# Patient Record
Sex: MALE | Race: WHITE | NOT HISPANIC OR LATINO | Employment: FULL TIME | URBAN - METROPOLITAN AREA
[De-identification: names, ages, dates, MRNs, and addresses within clinical notes are randomized per-mention and may not be internally consistent; named-entity substitution may affect disease eponyms.]

---

## 2017-01-08 ENCOUNTER — COMMUNICATION - HEALTHEAST (OUTPATIENT)
Dept: CARDIOLOGY | Facility: CLINIC | Age: 57
End: 2017-01-08

## 2017-01-08 DIAGNOSIS — I25.10 CORONARY ATHEROSCLEROSIS: ICD-10-CM

## 2017-01-08 DIAGNOSIS — I42.8 OTHER PRIMARY CARDIOMYOPATHIES: ICD-10-CM

## 2017-01-09 ENCOUNTER — OFFICE VISIT - HEALTHEAST (OUTPATIENT)
Dept: FAMILY MEDICINE | Facility: CLINIC | Age: 57
End: 2017-01-09

## 2017-01-09 DIAGNOSIS — E78.00 PURE HYPERCHOLESTEROLEMIA: ICD-10-CM

## 2017-01-09 DIAGNOSIS — Z12.5 PROSTATE CANCER SCREENING: ICD-10-CM

## 2017-01-09 DIAGNOSIS — M54.50 ACUTE BILATERAL LOW BACK PAIN WITHOUT SCIATICA: ICD-10-CM

## 2017-01-09 DIAGNOSIS — I10 ESSENTIAL HYPERTENSION: ICD-10-CM

## 2017-01-09 DIAGNOSIS — I25.10 CORONARY ATHEROSCLEROSIS: ICD-10-CM

## 2017-01-09 LAB
CHOLEST SERPL-MCNC: 123 MG/DL
LDLC SERPL CALC-MCNC: 44 MG/DL
PSA SERPL-MCNC: 0.6 NG/ML (ref 0–3.5)

## 2017-01-09 ASSESSMENT — MIFFLIN-ST. JEOR: SCORE: 1466.03

## 2017-01-10 ENCOUNTER — COMMUNICATION - HEALTHEAST (OUTPATIENT)
Dept: FAMILY MEDICINE | Facility: CLINIC | Age: 57
End: 2017-01-10

## 2017-02-02 ENCOUNTER — OFFICE VISIT - HEALTHEAST (OUTPATIENT)
Dept: CARDIOLOGY | Facility: CLINIC | Age: 57
End: 2017-02-02

## 2017-02-02 DIAGNOSIS — I21.4 ACUTE NON-ST ELEVATION MYOCARDIAL INFARCTION (NSTEMI) (H): ICD-10-CM

## 2017-02-02 DIAGNOSIS — I25.89 OTHER SPECIFIED FORMS OF CHRONIC ISCHEMIC HEART DISEASE: ICD-10-CM

## 2017-02-02 DIAGNOSIS — I20.89 ANGINA EFFORT: ICD-10-CM

## 2017-02-02 DIAGNOSIS — I25.5 ISCHEMIC CARDIOMYOPATHY: ICD-10-CM

## 2017-02-02 DIAGNOSIS — Z95.5 S/P CORONARY ARTERY STENT PLACEMENT: ICD-10-CM

## 2017-02-02 ASSESSMENT — MIFFLIN-ST. JEOR: SCORE: 1461.49

## 2018-01-16 ENCOUNTER — OFFICE VISIT - HEALTHEAST (OUTPATIENT)
Dept: FAMILY MEDICINE | Facility: CLINIC | Age: 58
End: 2018-01-16

## 2018-01-16 DIAGNOSIS — J10.1 INFLUENZA B: ICD-10-CM

## 2018-01-16 DIAGNOSIS — R05.9 COUGH: ICD-10-CM

## 2018-01-16 LAB
FLUAV AG SPEC QL IA: ABNORMAL
FLUBV AG SPEC QL IA: ABNORMAL

## 2018-01-16 ASSESSMENT — MIFFLIN-ST. JEOR: SCORE: 1484.17

## 2018-02-25 ENCOUNTER — COMMUNICATION - HEALTHEAST (OUTPATIENT)
Dept: CARDIOLOGY | Facility: CLINIC | Age: 58
End: 2018-02-25

## 2018-02-25 DIAGNOSIS — I25.5 ISCHEMIC CARDIOMYOPATHY: ICD-10-CM

## 2018-02-25 DIAGNOSIS — I21.4 ACUTE NON-ST ELEVATION MYOCARDIAL INFARCTION (NSTEMI) (H): ICD-10-CM

## 2018-02-25 DIAGNOSIS — Z95.5 S/P CORONARY ARTERY STENT PLACEMENT: ICD-10-CM

## 2018-02-25 DIAGNOSIS — I20.89 ANGINA EFFORT: ICD-10-CM

## 2018-04-24 ENCOUNTER — OFFICE VISIT - HEALTHEAST (OUTPATIENT)
Dept: CARDIOLOGY | Facility: CLINIC | Age: 58
End: 2018-04-24

## 2018-04-24 ENCOUNTER — AMBULATORY - HEALTHEAST (OUTPATIENT)
Dept: CARDIOLOGY | Facility: CLINIC | Age: 58
End: 2018-04-24

## 2018-04-24 ENCOUNTER — SURGERY - HEALTHEAST (OUTPATIENT)
Dept: CARDIOLOGY | Facility: CLINIC | Age: 58
End: 2018-04-24

## 2018-04-24 DIAGNOSIS — I25.10 CAD (CORONARY ARTERY DISEASE): ICD-10-CM

## 2018-04-24 DIAGNOSIS — I20.9 ANGINA PECTORIS (H): ICD-10-CM

## 2018-04-24 ASSESSMENT — MIFFLIN-ST. JEOR: SCORE: 1461.49

## 2018-05-04 ENCOUNTER — SURGERY - HEALTHEAST (OUTPATIENT)
Dept: CARDIOLOGY | Facility: CLINIC | Age: 58
End: 2018-05-04

## 2018-05-04 ASSESSMENT — MIFFLIN-ST. JEOR: SCORE: 1439.96

## 2018-05-21 ENCOUNTER — OFFICE VISIT - HEALTHEAST (OUTPATIENT)
Dept: FAMILY MEDICINE | Facility: CLINIC | Age: 58
End: 2018-05-21

## 2018-05-21 DIAGNOSIS — I10 HYPERTENSION: ICD-10-CM

## 2018-05-21 DIAGNOSIS — Z00.00 WELLNESS EXAMINATION: ICD-10-CM

## 2018-05-21 DIAGNOSIS — I21.9 ACUTE MYOCARDIAL INFARCTION (H): ICD-10-CM

## 2018-05-21 DIAGNOSIS — E78.5 HYPERLIPIDEMIA: ICD-10-CM

## 2018-05-21 LAB — PSA SERPL-MCNC: 0.6 NG/ML (ref 0–3.5)

## 2018-05-21 ASSESSMENT — MIFFLIN-ST. JEOR: SCORE: 1459.23

## 2018-07-14 ENCOUNTER — OFFICE VISIT - HEALTHEAST (OUTPATIENT)
Dept: FAMILY MEDICINE | Facility: CLINIC | Age: 58
End: 2018-07-14

## 2018-07-14 DIAGNOSIS — H00.012 HORDEOLUM EXTERNUM OF RIGHT LOWER EYELID: ICD-10-CM

## 2018-07-14 ASSESSMENT — MIFFLIN-ST. JEOR: SCORE: 1467.17

## 2018-08-31 ENCOUNTER — COMMUNICATION - HEALTHEAST (OUTPATIENT)
Dept: CARDIOLOGY | Facility: CLINIC | Age: 58
End: 2018-08-31

## 2018-08-31 DIAGNOSIS — I20.89 ANGINA EFFORT: ICD-10-CM

## 2018-08-31 DIAGNOSIS — I25.5 ISCHEMIC CARDIOMYOPATHY: ICD-10-CM

## 2018-08-31 DIAGNOSIS — I21.4 ACUTE NON-ST ELEVATION MYOCARDIAL INFARCTION (NSTEMI) (H): ICD-10-CM

## 2018-08-31 DIAGNOSIS — Z95.5 S/P CORONARY ARTERY STENT PLACEMENT: ICD-10-CM

## 2019-03-04 ENCOUNTER — COMMUNICATION - HEALTHEAST (OUTPATIENT)
Dept: CARDIOLOGY | Facility: CLINIC | Age: 59
End: 2019-03-04

## 2019-03-04 DIAGNOSIS — Z95.5 S/P CORONARY ARTERY STENT PLACEMENT: ICD-10-CM

## 2019-03-04 DIAGNOSIS — I25.5 ISCHEMIC CARDIOMYOPATHY: ICD-10-CM

## 2019-03-04 DIAGNOSIS — I21.4 ACUTE NON-ST ELEVATION MYOCARDIAL INFARCTION (NSTEMI) (H): ICD-10-CM

## 2019-03-04 DIAGNOSIS — I20.89 ANGINA EFFORT: ICD-10-CM

## 2019-07-31 ENCOUNTER — HOSPITAL ENCOUNTER (OUTPATIENT)
Dept: CT IMAGING | Facility: CLINIC | Age: 59
Discharge: HOME | End: 2019-07-31
Attending: FAMILY MEDICINE

## 2019-07-31 ENCOUNTER — OFFICE VISIT - HEALTHEAST (OUTPATIENT)
Dept: FAMILY MEDICINE | Facility: CLINIC | Age: 59
End: 2019-07-31

## 2019-07-31 DIAGNOSIS — R10.32 ABDOMINAL PAIN, LEFT LOWER QUADRANT: ICD-10-CM

## 2019-07-31 DIAGNOSIS — R10.13 ABDOMINAL PAIN, EPIGASTRIC: ICD-10-CM

## 2019-07-31 LAB
ALBUMIN UR-MCNC: NEGATIVE MG/DL
APPEARANCE UR: CLEAR
BACTERIA #/AREA URNS HPF: ABNORMAL HPF
BILIRUB UR QL STRIP: NEGATIVE
COLOR UR AUTO: YELLOW
GLUCOSE UR STRIP-MCNC: NEGATIVE MG/DL
HGB UR QL STRIP: ABNORMAL
KETONES UR STRIP-MCNC: NEGATIVE MG/DL
LEUKOCYTE ESTERASE UR QL STRIP: NEGATIVE
NITRATE UR QL: NEGATIVE
PH UR STRIP: 7 [PH] (ref 5–8)
RBC #/AREA URNS AUTO: ABNORMAL HPF
SP GR UR STRIP: 1.01 (ref 1–1.03)
SQUAMOUS #/AREA URNS AUTO: ABNORMAL LPF
URATE CRY #/AREA URNS HPF: PRESENT /[HPF]
UROBILINOGEN UR STRIP-ACNC: ABNORMAL
WBC #/AREA URNS AUTO: ABNORMAL HPF

## 2019-08-07 ENCOUNTER — OFFICE VISIT - HEALTHEAST (OUTPATIENT)
Dept: FAMILY MEDICINE | Facility: CLINIC | Age: 59
End: 2019-08-07

## 2019-08-07 DIAGNOSIS — N30.01 ACUTE CYSTITIS WITH HEMATURIA: ICD-10-CM

## 2019-08-07 LAB
ALBUMIN UR-MCNC: NEGATIVE MG/DL
APPEARANCE UR: CLEAR
BACTERIA #/AREA URNS HPF: ABNORMAL HPF
BILIRUB UR QL STRIP: NEGATIVE
COLOR UR AUTO: YELLOW
GLUCOSE UR STRIP-MCNC: NEGATIVE MG/DL
HGB UR QL STRIP: ABNORMAL
KETONES UR STRIP-MCNC: NEGATIVE MG/DL
LEUKOCYTE ESTERASE UR QL STRIP: NEGATIVE
NITRATE UR QL: NEGATIVE
PH UR STRIP: 5.5 [PH] (ref 5–8)
RBC #/AREA URNS AUTO: ABNORMAL HPF
SP GR UR STRIP: 1.01 (ref 1–1.03)
SQUAMOUS #/AREA URNS AUTO: ABNORMAL LPF
UROBILINOGEN UR STRIP-ACNC: ABNORMAL
WBC #/AREA URNS AUTO: ABNORMAL HPF

## 2019-08-30 ENCOUNTER — COMMUNICATION - HEALTHEAST (OUTPATIENT)
Dept: CARDIOLOGY | Facility: CLINIC | Age: 59
End: 2019-08-30

## 2019-08-30 DIAGNOSIS — I25.5 ISCHEMIC CARDIOMYOPATHY: ICD-10-CM

## 2019-08-30 DIAGNOSIS — I20.89 EXERCISE-INDUCED ANGINA (H): ICD-10-CM

## 2019-08-30 DIAGNOSIS — Z95.5 S/P CORONARY ARTERY STENT PLACEMENT: ICD-10-CM

## 2019-08-30 DIAGNOSIS — I21.4 ACUTE NON-ST ELEVATION MYOCARDIAL INFARCTION (NSTEMI) (H): ICD-10-CM

## 2019-09-04 ENCOUNTER — COMMUNICATION - HEALTHEAST (OUTPATIENT)
Dept: FAMILY MEDICINE | Facility: CLINIC | Age: 59
End: 2019-09-04

## 2019-09-04 ENCOUNTER — OFFICE VISIT - HEALTHEAST (OUTPATIENT)
Dept: FAMILY MEDICINE | Facility: CLINIC | Age: 59
End: 2019-09-04

## 2019-09-04 DIAGNOSIS — Z00.00 WELLNESS EXAMINATION: ICD-10-CM

## 2019-09-04 DIAGNOSIS — Z95.5 S/P CORONARY ARTERY STENT PLACEMENT: ICD-10-CM

## 2019-09-04 DIAGNOSIS — N30.01 ACUTE CYSTITIS WITH HEMATURIA: ICD-10-CM

## 2019-09-04 DIAGNOSIS — I20.89 EXERCISE-INDUCED ANGINA (H): ICD-10-CM

## 2019-09-04 DIAGNOSIS — I21.4 ACUTE NON-ST ELEVATION MYOCARDIAL INFARCTION (NSTEMI) (H): ICD-10-CM

## 2019-09-04 DIAGNOSIS — I25.5 ISCHEMIC CARDIOMYOPATHY: ICD-10-CM

## 2019-09-04 LAB
ALBUMIN UR-MCNC: NEGATIVE MG/DL
APPEARANCE UR: CLEAR
BILIRUB UR QL STRIP: NEGATIVE
COLOR UR AUTO: YELLOW
ERYTHROCYTE [DISTWIDTH] IN BLOOD BY AUTOMATED COUNT: 11.9 % (ref 11–14.5)
GLUCOSE UR STRIP-MCNC: NEGATIVE MG/DL
HBA1C MFR BLD: 5.1 % (ref 3.5–6)
HCT VFR BLD AUTO: 43.1 % (ref 40–54)
HGB BLD-MCNC: 15.3 G/DL (ref 14–18)
HGB UR QL STRIP: ABNORMAL
KETONES UR STRIP-MCNC: NEGATIVE MG/DL
LEUKOCYTE ESTERASE UR QL STRIP: NEGATIVE
MCH RBC QN AUTO: 33.9 PG (ref 27–34)
MCHC RBC AUTO-ENTMCNC: 35.5 G/DL (ref 32–36)
MCV RBC AUTO: 95 FL (ref 80–100)
NITRATE UR QL: NEGATIVE
PH UR STRIP: 5.5 [PH] (ref 5–8)
PLATELET # BLD AUTO: 306 THOU/UL (ref 140–440)
PMV BLD AUTO: 6.5 FL (ref 7–10)
RBC # BLD AUTO: 4.51 MILL/UL (ref 4.4–6.2)
SP GR UR STRIP: 1.02 (ref 1–1.03)
UROBILINOGEN UR STRIP-ACNC: ABNORMAL
WBC: 8.5 THOU/UL (ref 4–11)

## 2019-09-05 ENCOUNTER — COMMUNICATION - HEALTHEAST (OUTPATIENT)
Dept: FAMILY MEDICINE | Facility: CLINIC | Age: 59
End: 2019-09-05

## 2019-09-05 LAB
ANION GAP SERPL CALCULATED.3IONS-SCNC: 11 MMOL/L (ref 5–18)
BUN SERPL-MCNC: 16 MG/DL (ref 8–22)
CALCIUM SERPL-MCNC: 9.6 MG/DL (ref 8.5–10.5)
CHLORIDE BLD-SCNC: 100 MMOL/L (ref 98–107)
CHOLEST SERPL-MCNC: 129 MG/DL
CO2 SERPL-SCNC: 25 MMOL/L (ref 22–31)
CREAT SERPL-MCNC: 0.97 MG/DL (ref 0.7–1.3)
FASTING STATUS PATIENT QL REPORTED: ABNORMAL
GFR SERPL CREATININE-BSD FRML MDRD: >60 ML/MIN/1.73M2
GLUCOSE BLD-MCNC: 80 MG/DL (ref 70–125)
HDLC SERPL-MCNC: 37 MG/DL
LDLC SERPL CALC-MCNC: 41 MG/DL
LDLC SERPL CALC-MCNC: ABNORMAL MG/DL
POTASSIUM BLD-SCNC: 4.1 MMOL/L (ref 3.5–5)
PSA SERPL-MCNC: 0.5 NG/ML (ref 0–3.5)
SODIUM SERPL-SCNC: 136 MMOL/L (ref 136–145)
TRIGL SERPL-MCNC: 558 MG/DL

## 2019-09-09 ENCOUNTER — RECORDS - HEALTHEAST (OUTPATIENT)
Dept: ADMINISTRATIVE | Facility: OTHER | Age: 59
End: 2019-09-09

## 2020-03-06 ENCOUNTER — COMMUNICATION - HEALTHEAST (OUTPATIENT)
Dept: FAMILY MEDICINE | Facility: CLINIC | Age: 60
End: 2020-03-06

## 2020-03-06 DIAGNOSIS — I21.4 ACUTE NON-ST ELEVATION MYOCARDIAL INFARCTION (NSTEMI) (H): ICD-10-CM

## 2020-03-06 DIAGNOSIS — I25.5 ISCHEMIC CARDIOMYOPATHY: ICD-10-CM

## 2020-03-06 DIAGNOSIS — Z95.5 S/P CORONARY ARTERY STENT PLACEMENT: ICD-10-CM

## 2020-03-06 DIAGNOSIS — I20.89 EXERCISE-INDUCED ANGINA (H): ICD-10-CM

## 2020-09-01 ENCOUNTER — COMMUNICATION - HEALTHEAST (OUTPATIENT)
Dept: FAMILY MEDICINE | Facility: CLINIC | Age: 60
End: 2020-09-01

## 2020-09-01 DIAGNOSIS — I21.4 ACUTE NON-ST ELEVATION MYOCARDIAL INFARCTION (NSTEMI) (H): ICD-10-CM

## 2020-09-01 DIAGNOSIS — I25.5 ISCHEMIC CARDIOMYOPATHY: ICD-10-CM

## 2020-09-01 DIAGNOSIS — Z95.5 S/P CORONARY ARTERY STENT PLACEMENT: ICD-10-CM

## 2020-09-01 DIAGNOSIS — I20.89 EXERCISE-INDUCED ANGINA (H): ICD-10-CM

## 2020-10-05 ENCOUNTER — COMMUNICATION - HEALTHEAST (OUTPATIENT)
Dept: FAMILY MEDICINE | Facility: CLINIC | Age: 60
End: 2020-10-05

## 2020-10-05 ENCOUNTER — OFFICE VISIT - HEALTHEAST (OUTPATIENT)
Dept: FAMILY MEDICINE | Facility: CLINIC | Age: 60
End: 2020-10-05

## 2020-10-05 DIAGNOSIS — I25.5 ISCHEMIC CARDIOMYOPATHY: ICD-10-CM

## 2020-10-05 DIAGNOSIS — I21.4 ACUTE NON-ST ELEVATION MYOCARDIAL INFARCTION (NSTEMI) (H): ICD-10-CM

## 2020-10-05 DIAGNOSIS — Z95.5 S/P CORONARY ARTERY STENT PLACEMENT: ICD-10-CM

## 2020-10-05 DIAGNOSIS — I20.89 EXERCISE-INDUCED ANGINA (H): ICD-10-CM

## 2020-10-05 DIAGNOSIS — Z00.00 ROUTINE ADULT HEALTH MAINTENANCE: ICD-10-CM

## 2020-10-05 DIAGNOSIS — Z00.00 WELLNESS EXAMINATION: ICD-10-CM

## 2020-10-05 LAB
ERYTHROCYTE [DISTWIDTH] IN BLOOD BY AUTOMATED COUNT: 11.2 % (ref 11–14.5)
HBA1C MFR BLD: 5.4 %
HCT VFR BLD AUTO: 45.8 % (ref 40–54)
HGB BLD-MCNC: 15.8 G/DL (ref 14–18)
MCH RBC QN AUTO: 33.2 PG (ref 27–34)
MCHC RBC AUTO-ENTMCNC: 34.4 G/DL (ref 32–36)
MCV RBC AUTO: 96 FL (ref 80–100)
PLATELET # BLD AUTO: 320 THOU/UL (ref 140–440)
PMV BLD AUTO: 6.5 FL (ref 7–10)
RBC # BLD AUTO: 4.75 MILL/UL (ref 4.4–6.2)
WBC: 6.5 THOU/UL (ref 4–11)

## 2020-10-06 ENCOUNTER — COMMUNICATION - HEALTHEAST (OUTPATIENT)
Dept: FAMILY MEDICINE | Facility: CLINIC | Age: 60
End: 2020-10-06

## 2020-10-06 LAB
ANION GAP SERPL CALCULATED.3IONS-SCNC: 10 MMOL/L (ref 5–18)
BUN SERPL-MCNC: 13 MG/DL (ref 8–22)
CALCIUM SERPL-MCNC: 8.9 MG/DL (ref 8.5–10.5)
CHLORIDE BLD-SCNC: 104 MMOL/L (ref 98–107)
CHOLEST SERPL-MCNC: 199 MG/DL
CO2 SERPL-SCNC: 25 MMOL/L (ref 22–31)
CREAT SERPL-MCNC: 1.05 MG/DL (ref 0.7–1.3)
FASTING STATUS PATIENT QL REPORTED: NO
GFR SERPL CREATININE-BSD FRML MDRD: >60 ML/MIN/1.73M2
GLUCOSE BLD-MCNC: 91 MG/DL (ref 70–125)
HDLC SERPL-MCNC: 38 MG/DL
LDLC SERPL CALC-MCNC: 106 MG/DL
LDLC SERPL CALC-MCNC: ABNORMAL MG/DL
POTASSIUM BLD-SCNC: 4 MMOL/L (ref 3.5–5)
PSA SERPL-MCNC: 0.5 NG/ML (ref 0–4.5)
SODIUM SERPL-SCNC: 139 MMOL/L (ref 136–145)
TRIGL SERPL-MCNC: 512 MG/DL

## 2020-10-07 ENCOUNTER — COMMUNICATION - HEALTHEAST (OUTPATIENT)
Dept: FAMILY MEDICINE | Facility: CLINIC | Age: 60
End: 2020-10-07

## 2020-11-11 ENCOUNTER — RECORDS - HEALTHEAST (OUTPATIENT)
Dept: ADMINISTRATIVE | Facility: OTHER | Age: 60
End: 2020-11-11

## 2020-11-18 ENCOUNTER — COMMUNICATION - HEALTHEAST (OUTPATIENT)
Dept: CARDIOLOGY | Facility: CLINIC | Age: 60
End: 2020-11-18

## 2020-11-19 ENCOUNTER — OFFICE VISIT - HEALTHEAST (OUTPATIENT)
Dept: CARDIOLOGY | Facility: CLINIC | Age: 60
End: 2020-11-19

## 2020-11-19 DIAGNOSIS — I21.9 MYOCARDIAL INFARCTION (H): ICD-10-CM

## 2020-11-19 DIAGNOSIS — G47.33 OSA (OBSTRUCTIVE SLEEP APNEA): ICD-10-CM

## 2020-11-19 DIAGNOSIS — R06.09 DYSPNEA ON EXERTION: ICD-10-CM

## 2020-12-15 ENCOUNTER — COMMUNICATION - HEALTHEAST (OUTPATIENT)
Dept: SLEEP MEDICINE | Facility: CLINIC | Age: 60
End: 2020-12-15

## 2021-01-04 ENCOUNTER — HOSPITAL ENCOUNTER (OUTPATIENT)
Dept: CARDIOLOGY | Facility: CLINIC | Age: 61
Discharge: HOME | End: 2021-01-04
Attending: INTERNAL MEDICINE

## 2021-01-04 DIAGNOSIS — G47.33 OSA (OBSTRUCTIVE SLEEP APNEA): ICD-10-CM

## 2021-01-04 DIAGNOSIS — R06.09 DYSPNEA ON EXERTION: ICD-10-CM

## 2021-01-04 LAB
AORTIC ROOT: 3.2 CM
BSA FOR ECHO PROCEDURE: 1.79 M2
CV BLOOD PRESSURE: ABNORMAL MMHG
CV ECHO HEIGHT: 65 IN
CV ECHO WEIGHT: 155 LBS
DOP CALC LVOT AREA: 3.14 CM2
DOP CALC LVOT DIAMETER: 2 CM
DOP CALC LVOT PEAK VEL: 85.7 CM/S
DOP CALC LVOT STROKE VOLUME: 50.9 CM3
DOP CALCLVOT PEAK VEL VTI: 16.2 CM
EJECTION FRACTION: 56 % (ref 55–75)
FRACTIONAL SHORTENING: 29.6 % (ref 28–44)
INTERVENTRICULAR SEPTUM IN END DIASTOLE: 0.82 CM (ref 0.6–1)
IVS/PW RATIO: 0.9
LA AREA 1: 11.7 CM2
LA AREA 2: 11.6 CM2
LEFT ATRIUM LENGTH: 3.45 CM
LEFT ATRIUM SIZE: 2.8 CM
LEFT ATRIUM TO AORTIC ROOT RATIO: 0.88 NO UNITS
LEFT ATRIUM VOLUME INDEX: 18.7 ML/M2
LEFT ATRIUM VOLUME: 33.4 ML
LEFT VENTRICLE CARDIAC INDEX: 1.8 L/MIN/M2
LEFT VENTRICLE CARDIAC OUTPUT: 3.3 L/MIN
LEFT VENTRICLE DIASTOLIC VOLUME INDEX: 50.3 CM3/M2 (ref 34–74)
LEFT VENTRICLE DIASTOLIC VOLUME: 90 CM3 (ref 62–150)
LEFT VENTRICLE HEART RATE: 64 BPM
LEFT VENTRICLE MASS INDEX: 48.6 G/M2
LEFT VENTRICLE SYSTOLIC VOLUME INDEX: 22.3 CM3/M2 (ref 11–31)
LEFT VENTRICLE SYSTOLIC VOLUME: 40 CM3 (ref 21–61)
LEFT VENTRICULAR INTERNAL DIMENSION IN DIASTOLE: 3.62 CM (ref 4.2–5.8)
LEFT VENTRICULAR INTERNAL DIMENSION IN SYSTOLE: 2.55 CM (ref 2.5–4)
LEFT VENTRICULAR MASS: 87 G
LEFT VENTRICULAR OUTFLOW TRACT MEAN GRADIENT: 2 MMHG
LEFT VENTRICULAR OUTFLOW TRACT MEAN VELOCITY: 59.2 CM/S
LEFT VENTRICULAR OUTFLOW TRACT PEAK GRADIENT: 3 MMHG
LEFT VENTRICULAR POSTERIOR WALL IN END DIASTOLE: 0.88 CM (ref 0.6–1)
LV STROKE VOLUME INDEX: 28.4 ML/M2
MITRAL VALVE E/A RATIO: 0.7
MV AVERAGE E/E' RATIO: 6.3 CM/S
MV DECELERATION TIME: 331 MS
MV E'TISSUE VEL-LAT: 7.02 CM/S
MV E'TISSUE VEL-MED: 6.73 CM/S
MV LATERAL E/E' RATIO: 6.2
MV MEDIAL E/E' RATIO: 6.4
MV PEAK A VELOCITY: 59.7 CM/S
MV PEAK E VELOCITY: 43.4 CM/S
NUC REST DIASTOLIC VOLUME INDEX: 2480 LBS
NUC REST SYSTOLIC VOLUME INDEX: 65 IN
TRICUSPID REGURGITATION PEAK PRESSURE GRADIENT: 26.4 MMHG
TRICUSPID VALVE ANULAR PLANE SYSTOLIC EXCURSION: 2.1 CM
TRICUSPID VALVE PEAK REGURGITANT VELOCITY: 257 CM/S

## 2021-01-04 ASSESSMENT — MIFFLIN-ST. JEOR: SCORE: 1439.96

## 2021-03-29 ENCOUNTER — COMMUNICATION - HEALTHEAST (OUTPATIENT)
Dept: FAMILY MEDICINE | Facility: CLINIC | Age: 61
End: 2021-03-29

## 2021-03-29 DIAGNOSIS — Z95.5 S/P CORONARY ARTERY STENT PLACEMENT: ICD-10-CM

## 2021-03-29 DIAGNOSIS — I21.4 ACUTE NON-ST ELEVATION MYOCARDIAL INFARCTION (NSTEMI) (H): ICD-10-CM

## 2021-03-29 DIAGNOSIS — I25.5 ISCHEMIC CARDIOMYOPATHY: ICD-10-CM

## 2021-03-29 DIAGNOSIS — I20.89 EXERCISE-INDUCED ANGINA (H): ICD-10-CM

## 2021-05-27 VITALS — SYSTOLIC BLOOD PRESSURE: 158 MMHG | DIASTOLIC BLOOD PRESSURE: 85 MMHG | HEART RATE: 65 BPM

## 2021-05-30 VITALS — HEIGHT: 66 IN | BODY MASS INDEX: 25.55 KG/M2 | WEIGHT: 159 LBS

## 2021-05-30 VITALS — WEIGHT: 158 LBS | HEIGHT: 66 IN | BODY MASS INDEX: 25.39 KG/M2

## 2021-05-31 VITALS — HEIGHT: 66 IN | WEIGHT: 163 LBS | BODY MASS INDEX: 26.2 KG/M2

## 2021-05-31 NOTE — PROGRESS NOTES
Discussed results with Venancio, and we are going to wait and watch.  He appears to be improving today.  He will return next Wednesday for recheck of UA and symptoms.  In the meantime, if getting worse anyway he agreed to go to the emergency department for evaluation.

## 2021-05-31 NOTE — PROGRESS NOTES
Chief Complaint   Patient presents with     Abdominal Pain     Patient has been having mid to lower left side abd pain since Monday afternoon. Patient states the pain is always there, certain movements cause the pain to worsen. No nausea, no fevers, normal BMs.        HPI: Plex 59-year-old male with past history of myocardial infarction and hypertension and coronary artery disease as well as inguinal hernia repairs in the past, presents today with new onset left lower quadrant pain for 3 days duration.  He notes the pain for 3 days and its becoming more tender.  It only hurts when it is pushed on the left lower quadrant.  The pain is moderate.    ROS: No fever no melena or hematochezia nausea vomiting or diarrhea.  No hematuria.  10 point system review otherwise negative    SH:    reports that he has quit smoking. He has never used smokeless tobacco. He reports that he does not drink alcohol.      FH: The Patient's family history includes Snoring in his father.     Meds:  Venancio has a current medication list which includes the following prescription(s): aspirin, lisinopril, metoprolol succinate, omega-3 fatty acids, and rosuvastatin.    O:  /74 (Patient Site: Right Arm, Patient Position: Sitting, Cuff Size: Adult Regular)   Pulse 66   Temp 97.6  F (36.4  C) (Oral)   Wt 162 lb 3.2 oz (73.6 kg)   SpO2 97%   BMI 26.99 kg/m    Alert conversant no acute distress  Skin pink and dry  Lungs clear to auscultation  Heart regular rate and rhythm  Abdomen-soft nontender no mass except for pain to palpation over the left lower quadrant.  Genitalia-no evidence of inguinal hernia bilaterally    A/P:   1. Abdominal pain, left lower quadrant  The patient has evidence of left lower quadrant pain is hard to explain given the fact he has had a fever or any bowel changes.  We will have him go to [facility] today for urgent evaluation for possible diverticulitis.  Cannot rule out kidney stone, diverticulitis or acute  abdomen although the latter is less likely.  - CT Abdomen With Oral Without IV Contrast; Future  - Urinalysis    Addendum: UA came back positive for blood.  CT changed to CT stone run and I called the CT tech to verify that he is scheduled at 6:20 PM for this test.  I discussed case with the patient.

## 2021-05-31 NOTE — TELEPHONE ENCOUNTER
Refuse refill for metoprolol succ, crestor and lisinopril. Pt has not seen cardiologist in over 1 year. Contact PCP.

## 2021-05-31 NOTE — PROGRESS NOTES
Chief Complaint   Patient presents with     Follow-up       HPI: 59-year-old male presents today for recheck of hematuria.  He was seen on 7/31/2019 and notes reviewed.  CT scan from that time was reviewed showing moderate diverticulosis but no hailee acute pathology.  No kidney stones noted.  He has had no abdominal pain since we visited and the pain is totally resolved.  He is had no gross hematuria.    ROS: No hematuria no dysuria.  No back pain    SH:    reports that he has quit smoking. He has never used smokeless tobacco. He reports that he does not drink alcohol.      FH: The Patient's family history includes Snoring in his father.     Meds:  Venancio has a current medication list which includes the following prescription(s): aspirin, lisinopril, metoprolol succinate, omega-3 fatty acids, and rosuvastatin.    O:  /70   Pulse 67   Resp 16   Wt 160 lb 7 oz (72.8 kg)   SpO2 96%   BMI 26.70 kg/m    Alert conversant no acute distress  Skin pink and dry  No pain to palpation of the abdomen.  Psych-1×3 with good memory insight and judgment    Results reviewed from 5/21/2018 shows PSA of 0.6.    A/P:   1. Acute cystitis with hematuria  The patient's symptoms have resolved and he is symptom-free.  Will recheck UA today for the trace blood.  If positive would proceed with urology work-up.  - Urinalysis

## 2021-06-01 VITALS — BODY MASS INDEX: 25.83 KG/M2 | HEIGHT: 65 IN | WEIGHT: 155 LBS

## 2021-06-01 VITALS — BODY MASS INDEX: 26.82 KG/M2 | WEIGHT: 161 LBS | HEIGHT: 65 IN

## 2021-06-01 VITALS — WEIGHT: 159.25 LBS | HEIGHT: 65 IN | BODY MASS INDEX: 26.53 KG/M2

## 2021-06-01 VITALS — WEIGHT: 158 LBS | BODY MASS INDEX: 25.39 KG/M2 | HEIGHT: 66 IN

## 2021-06-01 NOTE — PROGRESS NOTES
Please contact patient, and document that you contacted him, and let him know that his UA was positive for blood and I would like him to see urology soon.

## 2021-06-01 NOTE — TELEPHONE ENCOUNTER
Left message to call back for: pt  Information to relay to patient:  Please relay message  ----- Message from Paz Lucero MD sent at 9/4/2019  5:44 PM CDT -----  Please contact patient, and document that you contacted him, and let him know that his UA was positive for blood and I would like him to see urology soon.

## 2021-06-01 NOTE — PROGRESS NOTES
Chief Complaint   Patient presents with      Annual physical       HPI: Patient presents today for his annual checkup.  He is currently taking statin and his hyperlipidemia remained stable.  Currently taking lisinopril and his hypertension remained stable.  His cardiomyopathy and coronary disease are stable and he had a catheterization last year which was normal.  He has not seen cardiology since that time.    Patient had hematuria earlier this summer and will recheck UA today.    ROS: No chest pain or shortness of breath.  No hematuria.  No dysuria.    SH:    reports that he has quit smoking. He has never used smokeless tobacco. He reports that he does not drink alcohol.      FH: The Patient's family history includes Snoring in his father.     Meds:  Venancio has a current medication list which includes the following prescription(s): aspirin, omega-3 fatty acids, lisinopril, metoprolol succinate, and rosuvastatin.    O:  /74   Pulse 70   Resp 16   Wt 161 lb 3 oz (73.1 kg)   SpO2 98%   BMI 26.82 kg/m     Constitutional:    --Vitals as above  --No acute distress  Eyes-  --Sclera noninjected  --Lids and conjunctiva normal  ENT-  --TMs clear  --Sclera noninjected  --Pharynx not erythematous  Neck-  --Neck supple with no cervical lymphadenopathy  Lungs-  --Clear to Auscultation  Heart-  --Regular rate and rhythm  Abdomen--  --Soft, non-tender, non-distended  Skin-  --Pink and dry  Psych-  --Alert and oriented  --Normal affect      A/P:   1. Acute cystitis with hematuria  We will check UA today.  If positive patient will follow-up with urology  - Urinalysis Macroscopic    2. Wellness examination  Encouraged dental exam, exercise, weight loss, healthy diet.  Colonoscopy is up-to-date.  Immunizations up-to-date.  - HM2(CBC w/o Differential)  - Basic Metabolic Panel  - PSA (Prostatic-Specific Antigen), Annual Screen  - Lipid Cascade  - Glycosylated Hemoglobin A1c      RTC 1 year

## 2021-06-03 VITALS
HEART RATE: 70 BPM | WEIGHT: 161.19 LBS | OXYGEN SATURATION: 98 % | DIASTOLIC BLOOD PRESSURE: 74 MMHG | BODY MASS INDEX: 26.82 KG/M2 | RESPIRATION RATE: 16 BRPM | SYSTOLIC BLOOD PRESSURE: 134 MMHG

## 2021-06-03 VITALS — WEIGHT: 160.44 LBS | BODY MASS INDEX: 26.7 KG/M2

## 2021-06-03 VITALS — BODY MASS INDEX: 26.99 KG/M2 | WEIGHT: 162.2 LBS

## 2021-06-05 VITALS
BODY MASS INDEX: 25.82 KG/M2 | OXYGEN SATURATION: 97 % | DIASTOLIC BLOOD PRESSURE: 84 MMHG | HEART RATE: 79 BPM | RESPIRATION RATE: 16 BRPM | WEIGHT: 155.19 LBS | SYSTOLIC BLOOD PRESSURE: 148 MMHG

## 2021-06-05 VITALS — HEIGHT: 65 IN | BODY MASS INDEX: 25.83 KG/M2 | WEIGHT: 155 LBS

## 2021-06-06 NOTE — TELEPHONE ENCOUNTER
Refill Approved    Rx renewed per Medication Renewal Policy. Medication was last renewed on 9/4/19.    Rosibel Nunez, [facility] Triage/Med Refill 3/6/2020     Requested Prescriptions   Pending Prescriptions Disp Refills     metoprolol succinate (TOPROL-XL) 100 MG 24 hr tablet [Pharmacy Med Name: Metoprolol Succinate  MG Oral Tablet Extended Release 24 Hour] 90 tablet 0     Sig: Take 1 tablet by mouth once daily       Beta-Blockers Refill Protocol Passed - 3/6/2020  5:30 AM        Passed - PCP or prescribing provider visit in past 12 months or next 3 months     Last office visit with prescriber/PCP: 9/4/2019 Paz Lucero MD OR same dept: 9/4/2019 Paz Lucero MD OR same specialty: 9/4/2019 Paz Lucero MD  Last physical: Visit date not found Last MTM visit: Visit date not found   Next visit within 3 mo: Visit date not found  Next physical within 3 mo: Visit date not found  Prescriber OR PCP: Paz Lucero MD  Last diagnosis associated with med order: 1. Acute non-ST elevation myocardial infarction (NSTEMI) (H)  - metoprolol succinate (TOPROL-XL) 100 MG 24 hr tablet [Pharmacy Med Name: Metoprolol Succinate  MG Oral Tablet Extended Release 24 Hour]; Take 1 tablet by mouth once daily  Dispense: 90 tablet; Refill: 0  - lisinopriL (PRINIVIL,ZESTRIL) 10 MG tablet [Pharmacy Med Name: Lisinopril 10 MG Oral Tablet]; Take 1 tablet by mouth once daily  Dispense: 90 tablet; Refill: 0  - rosuvastatin (CRESTOR) 20 MG tablet [Pharmacy Med Name: Rosuvastatin Calcium 20 MG Oral Tablet]; TAKE 1 TABLET BY MOUTH AT BEDTIME  Dispense: 90 tablet; Refill: 0    2. S/P coronary artery stent placement  - metoprolol succinate (TOPROL-XL) 100 MG 24 hr tablet [Pharmacy Med Name: Metoprolol Succinate  MG Oral Tablet Extended Release 24 Hour]; Take 1 tablet by mouth once daily  Dispense: 90 tablet; Refill: 0  - lisinopriL (PRINIVIL,ZESTRIL) 10 MG tablet [Pharmacy Med Name: Lisinopril 10 MG  Oral Tablet]; Take 1 tablet by mouth once daily  Dispense: 90 tablet; Refill: 0  - rosuvastatin (CRESTOR) 20 MG tablet [Pharmacy Med Name: Rosuvastatin Calcium 20 MG Oral Tablet]; TAKE 1 TABLET BY MOUTH AT BEDTIME  Dispense: 90 tablet; Refill: 0    3. Exercise-induced angina (H)  - metoprolol succinate (TOPROL-XL) 100 MG 24 hr tablet [Pharmacy Med Name: Metoprolol Succinate  MG Oral Tablet Extended Release 24 Hour]; Take 1 tablet by mouth once daily  Dispense: 90 tablet; Refill: 0  - lisinopriL (PRINIVIL,ZESTRIL) 10 MG tablet [Pharmacy Med Name: Lisinopril 10 MG Oral Tablet]; Take 1 tablet by mouth once daily  Dispense: 90 tablet; Refill: 0  - rosuvastatin (CRESTOR) 20 MG tablet [Pharmacy Med Name: Rosuvastatin Calcium 20 MG Oral Tablet]; TAKE 1 TABLET BY MOUTH AT BEDTIME  Dispense: 90 tablet; Refill: 0    4. Ischemic cardiomyopathy  - metoprolol succinate (TOPROL-XL) 100 MG 24 hr tablet [Pharmacy Med Name: Metoprolol Succinate  MG Oral Tablet Extended Release 24 Hour]; Take 1 tablet by mouth once daily  Dispense: 90 tablet; Refill: 0  - lisinopriL (PRINIVIL,ZESTRIL) 10 MG tablet [Pharmacy Med Name: Lisinopril 10 MG Oral Tablet]; Take 1 tablet by mouth once daily  Dispense: 90 tablet; Refill: 0  - rosuvastatin (CRESTOR) 20 MG tablet [Pharmacy Med Name: Rosuvastatin Calcium 20 MG Oral Tablet]; TAKE 1 TABLET BY MOUTH AT BEDTIME  Dispense: 90 tablet; Refill: 0    If protocol passes may refill for 12 months if within 3 months of last provider visit (or a total of 15 months).             Passed - Blood pressure filed in past 12 months     BP Readings from Last 1 Encounters:   09/04/19 134/74             lisinopriL (PRINIVIL,ZESTRIL) 10 MG tablet [Pharmacy Med Name: Lisinopril 10 MG Oral Tablet] 90 tablet 0     Sig: Take 1 tablet by mouth once daily       Ace Inhibitors Refill Protocol Passed - 3/6/2020  5:30 AM        Passed - PCP or prescribing provider visit in past 12 months       Last office visit with  prescriber/PCP: 9/4/2019 Paz Lucero MD OR same dept: 9/4/2019 Paz Lucero MD OR same specialty: 9/4/2019 Paz Lucero MD  Last physical: Visit date not found Last MTM visit: Visit date not found   Next visit within 3 mo: Visit date not found  Next physical within 3 mo: Visit date not found  Prescriber OR PCP: Paz Lucero MD  Last diagnosis associated with med order: 1. Acute non-ST elevation myocardial infarction (NSTEMI) (H)  - metoprolol succinate (TOPROL-XL) 100 MG 24 hr tablet [Pharmacy Med Name: Metoprolol Succinate  MG Oral Tablet Extended Release 24 Hour]; Take 1 tablet by mouth once daily  Dispense: 90 tablet; Refill: 0  - lisinopriL (PRINIVIL,ZESTRIL) 10 MG tablet [Pharmacy Med Name: Lisinopril 10 MG Oral Tablet]; Take 1 tablet by mouth once daily  Dispense: 90 tablet; Refill: 0  - rosuvastatin (CRESTOR) 20 MG tablet [Pharmacy Med Name: Rosuvastatin Calcium 20 MG Oral Tablet]; TAKE 1 TABLET BY MOUTH AT BEDTIME  Dispense: 90 tablet; Refill: 0    2. S/P coronary artery stent placement  - metoprolol succinate (TOPROL-XL) 100 MG 24 hr tablet [Pharmacy Med Name: Metoprolol Succinate  MG Oral Tablet Extended Release 24 Hour]; Take 1 tablet by mouth once daily  Dispense: 90 tablet; Refill: 0  - lisinopriL (PRINIVIL,ZESTRIL) 10 MG tablet [Pharmacy Med Name: Lisinopril 10 MG Oral Tablet]; Take 1 tablet by mouth once daily  Dispense: 90 tablet; Refill: 0  - rosuvastatin (CRESTOR) 20 MG tablet [Pharmacy Med Name: Rosuvastatin Calcium 20 MG Oral Tablet]; TAKE 1 TABLET BY MOUTH AT BEDTIME  Dispense: 90 tablet; Refill: 0    3. Exercise-induced angina (H)  - metoprolol succinate (TOPROL-XL) 100 MG 24 hr tablet [Pharmacy Med Name: Metoprolol Succinate  MG Oral Tablet Extended Release 24 Hour]; Take 1 tablet by mouth once daily  Dispense: 90 tablet; Refill: 0  - lisinopriL (PRINIVIL,ZESTRIL) 10 MG tablet [Pharmacy Med Name: Lisinopril 10 MG Oral Tablet]; Take 1 tablet  by mouth once daily  Dispense: 90 tablet; Refill: 0  - rosuvastatin (CRESTOR) 20 MG tablet [Pharmacy Med Name: Rosuvastatin Calcium 20 MG Oral Tablet]; TAKE 1 TABLET BY MOUTH AT BEDTIME  Dispense: 90 tablet; Refill: 0    4. Ischemic cardiomyopathy  - metoprolol succinate (TOPROL-XL) 100 MG 24 hr tablet [Pharmacy Med Name: Metoprolol Succinate  MG Oral Tablet Extended Release 24 Hour]; Take 1 tablet by mouth once daily  Dispense: 90 tablet; Refill: 0  - lisinopriL (PRINIVIL,ZESTRIL) 10 MG tablet [Pharmacy Med Name: Lisinopril 10 MG Oral Tablet]; Take 1 tablet by mouth once daily  Dispense: 90 tablet; Refill: 0  - rosuvastatin (CRESTOR) 20 MG tablet [Pharmacy Med Name: Rosuvastatin Calcium 20 MG Oral Tablet]; TAKE 1 TABLET BY MOUTH AT BEDTIME  Dispense: 90 tablet; Refill: 0    If protocol passes may refill for 12 months if within 3 months of last provider visit (or a total of 15 months).             Passed - Serum Potassium in past 12 months     Lab Results   Component Value Date    Potassium 4.1 09/04/2019             Passed - Blood pressure filed in past 12 months     BP Readings from Last 1 Encounters:   09/04/19 134/74             Passed - Serum Creatinine in past 12 months     Creatinine   Date Value Ref Range Status   09/04/2019 0.97 0.70 - 1.30 mg/dL Final             rosuvastatin (CRESTOR) 20 MG tablet [Pharmacy Med Name: Rosuvastatin Calcium 20 MG Oral Tablet] 90 tablet 0     Sig: TAKE 1 TABLET BY MOUTH AT BEDTIME       Statins Refill Protocol (Hmg CoA Reductase Inhibitors) Passed - 3/6/2020  5:30 AM        Passed - PCP or prescribing provider visit in past 12 months      Last office visit with prescriber/PCP: 9/4/2019 Paz Lucero MD OR same dept: 9/4/2019 Paz Lucero MD OR same specialty: 9/4/2019 Paz Lucero MD  Last physical: Visit date not found Last MTM visit: Visit date not found   Next visit within 3 mo: Visit date not found  Next physical within 3 mo: Visit date not  found  Prescriber OR PCP: Paz Lucero MD  Last diagnosis associated with med order: 1. Acute non-ST elevation myocardial infarction (NSTEMI) (H)  - metoprolol succinate (TOPROL-XL) 100 MG 24 hr tablet [Pharmacy Med Name: Metoprolol Succinate  MG Oral Tablet Extended Release 24 Hour]; Take 1 tablet by mouth once daily  Dispense: 90 tablet; Refill: 0  - lisinopriL (PRINIVIL,ZESTRIL) 10 MG tablet [Pharmacy Med Name: Lisinopril 10 MG Oral Tablet]; Take 1 tablet by mouth once daily  Dispense: 90 tablet; Refill: 0  - rosuvastatin (CRESTOR) 20 MG tablet [Pharmacy Med Name: Rosuvastatin Calcium 20 MG Oral Tablet]; TAKE 1 TABLET BY MOUTH AT BEDTIME  Dispense: 90 tablet; Refill: 0    2. S/P coronary artery stent placement  - metoprolol succinate (TOPROL-XL) 100 MG 24 hr tablet [Pharmacy Med Name: Metoprolol Succinate  MG Oral Tablet Extended Release 24 Hour]; Take 1 tablet by mouth once daily  Dispense: 90 tablet; Refill: 0  - lisinopriL (PRINIVIL,ZESTRIL) 10 MG tablet [Pharmacy Med Name: Lisinopril 10 MG Oral Tablet]; Take 1 tablet by mouth once daily  Dispense: 90 tablet; Refill: 0  - rosuvastatin (CRESTOR) 20 MG tablet [Pharmacy Med Name: Rosuvastatin Calcium 20 MG Oral Tablet]; TAKE 1 TABLET BY MOUTH AT BEDTIME  Dispense: 90 tablet; Refill: 0    3. Exercise-induced angina (H)  - metoprolol succinate (TOPROL-XL) 100 MG 24 hr tablet [Pharmacy Med Name: Metoprolol Succinate  MG Oral Tablet Extended Release 24 Hour]; Take 1 tablet by mouth once daily  Dispense: 90 tablet; Refill: 0  - lisinopriL (PRINIVIL,ZESTRIL) 10 MG tablet [Pharmacy Med Name: Lisinopril 10 MG Oral Tablet]; Take 1 tablet by mouth once daily  Dispense: 90 tablet; Refill: 0  - rosuvastatin (CRESTOR) 20 MG tablet [Pharmacy Med Name: Rosuvastatin Calcium 20 MG Oral Tablet]; TAKE 1 TABLET BY MOUTH AT BEDTIME  Dispense: 90 tablet; Refill: 0    4. Ischemic cardiomyopathy  - metoprolol succinate (TOPROL-XL) 100 MG 24 hr tablet [Pharmacy  Med Name: Metoprolol Succinate  MG Oral Tablet Extended Release 24 Hour]; Take 1 tablet by mouth once daily  Dispense: 90 tablet; Refill: 0  - lisinopriL (PRINIVIL,ZESTRIL) 10 MG tablet [Pharmacy Med Name: Lisinopril 10 MG Oral Tablet]; Take 1 tablet by mouth once daily  Dispense: 90 tablet; Refill: 0  - rosuvastatin (CRESTOR) 20 MG tablet [Pharmacy Med Name: Rosuvastatin Calcium 20 MG Oral Tablet]; TAKE 1 TABLET BY MOUTH AT BEDTIME  Dispense: 90 tablet; Refill: 0    If protocol passes may refill for 12 months if within 3 months of last provider visit (or a total of 15 months).

## 2021-06-08 NOTE — PROGRESS NOTES
"Venancio Tolentino is a 56-year-old with a history of coronary artery disease hypertension hypercholesterolemia who presents today for follow-up.  Over the last 10 days he has had some low back pain.  He cannot think of any specific activity injury or overuse that may have caused this.  The pain does not radiate from his lower back nor does he have any numbness tingling or focal weakness.  He has not really tried to do her take anything for this but it does seem like it's gradually improving.  He notes that when he is up and moving about it seems to warm up and he is better for a time.    He denies any chest pains or shortness of breath he does not usually monitor any blood pressures he has not having any prostate related symptoms.  We reviewed heart disease prevention and cancer detection.  He is here today nonfasting    Objective:  Visit Vitals     /68 (Patient Site: Left Arm, Patient Position: Sitting, Cuff Size: Adult Regular)     Pulse 60     Temp 97.8  F (36.6  C) (Oral)     Resp 16     Ht 5' 5.5\" (1.664 m)     Wt 159 lb (72.1 kg)     BMI 26.06 kg/m2     Neck supple without lymphadenopathy lungs are clear heart with a regular rate and rhythm without a murmur back he has some very mild lower lumbar stiffness without other radiation his lower back range of motion is actually fairly good.  He has negative hip flexion negative straight leg raising in his lower extremities are free of edema skin appears normal    Labs pending    Assessment: Low back pain with coronary artery disease hypertension hypercholesterolemia    Plan: Discussed the role of ice heat stretching and watch for further symptoms.  We will call him with labs when available okay for tetanus shot here today and he'll follow-up in 6 months or as needed    "

## 2021-06-08 NOTE — PROGRESS NOTES
[facility] Note    Assessment/Plan:    Venancio Tolentino is a 56 y.o. old male with:  1. CAD s/p MI 2014 and repeat PCI `1/2015 off statin, now one year later - no symptoms, exercising without difficulty  2. CM - EF returned to normal, cont metoprolol/lisniopril      Dr. Frank Wilburn  [facility]  [phone]  ______________________________________________________________________    Subjective:    I had the opportunity to see Venancio Tolentino at the [facility]. Venancio Tolentino is a 56 y.o. male with a known history of MI s/p PCI to RCA and LAD 2014 and 2015,  ANISA off CPAP but wants to get back on.  Noc hestpain /pressure, syncopale events, PND, orthopnea, edema, palptiations.  Tolerating his medications  NOted last year he stopped atorvastatin du eto myalgias, and then had recurrent event with PCI. We discussed risks/benefits/goals/alternatives to clopidoglre and statins.  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  Noted symtpoms of angina were dyspnea on exertion.   ______________________________________________________________________      Review of Systems:   General: WNL  Eyes: WNL  Ears/Nose/Throat: Nosebleeds  Lungs: WNL  Heart: WNL  Stomach: WNL  Bladder: WNL  Muscle/Joints: WNL  Skin: WNL  Nervous System: WNL  Mental Health: WNL     Blood: WNL    Problem List:  Patient Active Problem List   Diagnosis     Essential Hypercholesterolemia     Essential Hypertension     Acute Myocardial Infarction     Coronary Artery Disease     Alcohol Dependence In Remission     Cardiomyopathy     Medical History:  Past Medical History:   Diagnosis Date     Coronary artery disease      Surgical History:  Past Surgical History:   Procedure Laterality Date     HERNIA REPAIR       Social History:  No pertinent social hx related to patient's chief complaint other than above  "in subjective        Family History:  No pertinent family hx related to patient's chief complaint other than above in subjective      Allergies:  No Known Allergies    Medications:  Current Outpatient Prescriptions   Medication Sig Dispense Refill     aspirin 81 mg chewable tablet Chew 81 mg daily.       clopidogrel (PLAVIX) 75 mg tablet TAKE 1 TABLET BY MOUTH EVERY DAY 90 tablet 1     lisinopril (PRINIVIL,ZESTRIL) 10 MG tablet TAKE ONE TABLET BY MOUTH ONCE DAILY 90 tablet 1     metoprolol succinate (TOPROL-XL) 100 MG 24 hr tablet TAKE 1 TABLET BY MOUTH EVERY NIGHT AT BEDTIME 90 tablet 0     omega-3 fatty acids (FISH OIL) 500 mg cap Take 1,000 mg/day by mouth daily.       rosuvastatin (CRESTOR) 20 MG tablet TAKE ONE TABLET BY MOUTH AT BEDTIME 90 tablet 1     No current facility-administered medications for this visit.        Objective:   Vital signs:  Visit Vitals     /78 (Patient Site: Left Arm, Patient Position: Sitting, Cuff Size: Adult Large)     Pulse 76     Resp 16     Ht 5' 5.5\" (1.664 m)     Wt 158 lb (71.7 kg)     BMI 25.89 kg/m2         Physical Exam:    GENERAL APPEARANCE: Alert, cooperative and in no acute distress.  HEENT: No scleral icterus. No Xanthelasma. Oral mucuos membranes pink and moist.  NECK: JVP<6cm. No Hepatojugular reflux. Thyroid not visualized  CHEST: clear to auscultation  CARDIOVASCULAR: S1, S2 without murmur ,clicks or rubs. Brachial, radial and posterior tibial pulses are intact and symetric. No carotid bruits noted.    ABDOMEN: Nontender. BS+. No bruits.  EXTREMITIES: No cyanosis, clubbing or edema.    Lab Results:  LIPIDS:  Lab Results   Component Value Date    CHOL 123 01/09/2017    CHOL 141 02/05/2016    CHOL 143 08/31/2015     Lab Results   Component Value Date    HDL 42 02/05/2016    HDL 30 (L) 06/08/2015    HDL 36 (L) 12/11/2014     Lab Results   Component Value Date    LDLCALC 56 02/05/2016    LDLCALC 28 06/08/2015    LDLCALC 44 12/11/2014     Lab Results   Component " Value Date    TRIG 215 (H) 02/05/2016    TRIG 150 (H) 06/08/2015    TRIG 117 12/11/2014     No components found for: CHOLHDL    BMP:  Lab Results   Component Value Date    CREATININE 0.88 01/09/2017    BUN 14 01/09/2017     01/09/2017    K 4.0 01/09/2017     01/09/2017    CO2 27 01/09/2017         Frank Wilburn MD  [facility]  [phone]

## 2021-06-11 NOTE — TELEPHONE ENCOUNTER
Due to be seen     Rx renewed per Medication Renewal Policy. Medication was last renewed on 3/6/20.    Rosibel Nunez, [facility] Triage/Med Refill 9/4/2020     Requested Prescriptions   Pending Prescriptions Disp Refills     metoprolol succinate (TOPROL-XL) 100 MG 24 hr tablet [Pharmacy Med Name: Metoprolol Succinate  MG Oral Tablet Extended Release 24 Hour] 90 tablet 0     Sig: Take 1 tablet by mouth once daily       Beta-Blockers Refill Protocol Passed - 9/1/2020  2:42 PM        Passed - PCP or prescribing provider visit in past 12 months or next 3 months     Last office visit with prescriber/PCP: 9/4/2019 Paz Lucero MD OR same dept: 9/4/2019 Paz Lucero MD OR same specialty: 9/4/2019 Paz Lucero MD  Last physical: Visit date not found Last MTM visit: Visit date not found   Next visit within 3 mo: Visit date not found  Next physical within 3 mo: Visit date not found  Prescriber OR PCP: Paz Lucero MD  Last diagnosis associated with med order: 1. Acute non-ST elevation myocardial infarction (NSTEMI) (H)  - metoprolol succinate (TOPROL-XL) 100 MG 24 hr tablet [Pharmacy Med Name: Metoprolol Succinate  MG Oral Tablet Extended Release 24 Hour]; Take 1 tablet by mouth once daily  Dispense: 90 tablet; Refill: 0  - lisinopriL (PRINIVIL,ZESTRIL) 10 MG tablet [Pharmacy Med Name: Lisinopril 10 MG Oral Tablet]; Take 1 tablet by mouth once daily  Dispense: 90 tablet; Refill: 0  - rosuvastatin (CRESTOR) 20 MG tablet [Pharmacy Med Name: Rosuvastatin Calcium 20 MG Oral Tablet]; TAKE 1 TABLET BY MOUTH AT BEDTIME  Dispense: 90 tablet; Refill: 0    2. S/P coronary artery stent placement  - metoprolol succinate (TOPROL-XL) 100 MG 24 hr tablet [Pharmacy Med Name: Metoprolol Succinate  MG Oral Tablet Extended Release 24 Hour]; Take 1 tablet by mouth once daily  Dispense: 90 tablet; Refill: 0  - lisinopriL (PRINIVIL,ZESTRIL) 10 MG tablet [Pharmacy Med Name: Lisinopril 10 MG  Oral Tablet]; Take 1 tablet by mouth once daily  Dispense: 90 tablet; Refill: 0  - rosuvastatin (CRESTOR) 20 MG tablet [Pharmacy Med Name: Rosuvastatin Calcium 20 MG Oral Tablet]; TAKE 1 TABLET BY MOUTH AT BEDTIME  Dispense: 90 tablet; Refill: 0    3. Exercise-induced angina (H)  - metoprolol succinate (TOPROL-XL) 100 MG 24 hr tablet [Pharmacy Med Name: Metoprolol Succinate  MG Oral Tablet Extended Release 24 Hour]; Take 1 tablet by mouth once daily  Dispense: 90 tablet; Refill: 0  - lisinopriL (PRINIVIL,ZESTRIL) 10 MG tablet [Pharmacy Med Name: Lisinopril 10 MG Oral Tablet]; Take 1 tablet by mouth once daily  Dispense: 90 tablet; Refill: 0  - rosuvastatin (CRESTOR) 20 MG tablet [Pharmacy Med Name: Rosuvastatin Calcium 20 MG Oral Tablet]; TAKE 1 TABLET BY MOUTH AT BEDTIME  Dispense: 90 tablet; Refill: 0    4. Ischemic cardiomyopathy  - metoprolol succinate (TOPROL-XL) 100 MG 24 hr tablet [Pharmacy Med Name: Metoprolol Succinate  MG Oral Tablet Extended Release 24 Hour]; Take 1 tablet by mouth once daily  Dispense: 90 tablet; Refill: 0  - lisinopriL (PRINIVIL,ZESTRIL) 10 MG tablet [Pharmacy Med Name: Lisinopril 10 MG Oral Tablet]; Take 1 tablet by mouth once daily  Dispense: 90 tablet; Refill: 0  - rosuvastatin (CRESTOR) 20 MG tablet [Pharmacy Med Name: Rosuvastatin Calcium 20 MG Oral Tablet]; TAKE 1 TABLET BY MOUTH AT BEDTIME  Dispense: 90 tablet; Refill: 0    If protocol passes may refill for 12 months if within 3 months of last provider visit (or a total of 15 months).             Passed - Blood pressure filed in past 12 months     BP Readings from Last 1 Encounters:   09/04/19 134/74                lisinopriL (PRINIVIL,ZESTRIL) 10 MG tablet [Pharmacy Med Name: Lisinopril 10 MG Oral Tablet] 90 tablet 0     Sig: Take 1 tablet by mouth once daily       Ace Inhibitors Refill Protocol Passed - 9/1/2020  2:42 PM        Passed - PCP or prescribing provider visit in past 12 months       Last office visit with  prescriber/PCP: 9/4/2019 Paz Lucero MD OR same dept: 9/4/2019 Paz Lucero MD OR same specialty: 9/4/2019 Paz Lucero MD  Last physical: Visit date not found Last MTM visit: Visit date not found   Next visit within 3 mo: Visit date not found  Next physical within 3 mo: Visit date not found  Prescriber OR PCP: Paz Lucero MD  Last diagnosis associated with med order: 1. Acute non-ST elevation myocardial infarction (NSTEMI) (H)  - metoprolol succinate (TOPROL-XL) 100 MG 24 hr tablet [Pharmacy Med Name: Metoprolol Succinate  MG Oral Tablet Extended Release 24 Hour]; Take 1 tablet by mouth once daily  Dispense: 90 tablet; Refill: 0  - lisinopriL (PRINIVIL,ZESTRIL) 10 MG tablet [Pharmacy Med Name: Lisinopril 10 MG Oral Tablet]; Take 1 tablet by mouth once daily  Dispense: 90 tablet; Refill: 0  - rosuvastatin (CRESTOR) 20 MG tablet [Pharmacy Med Name: Rosuvastatin Calcium 20 MG Oral Tablet]; TAKE 1 TABLET BY MOUTH AT BEDTIME  Dispense: 90 tablet; Refill: 0    2. S/P coronary artery stent placement  - metoprolol succinate (TOPROL-XL) 100 MG 24 hr tablet [Pharmacy Med Name: Metoprolol Succinate  MG Oral Tablet Extended Release 24 Hour]; Take 1 tablet by mouth once daily  Dispense: 90 tablet; Refill: 0  - lisinopriL (PRINIVIL,ZESTRIL) 10 MG tablet [Pharmacy Med Name: Lisinopril 10 MG Oral Tablet]; Take 1 tablet by mouth once daily  Dispense: 90 tablet; Refill: 0  - rosuvastatin (CRESTOR) 20 MG tablet [Pharmacy Med Name: Rosuvastatin Calcium 20 MG Oral Tablet]; TAKE 1 TABLET BY MOUTH AT BEDTIME  Dispense: 90 tablet; Refill: 0    3. Exercise-induced angina (H)  - metoprolol succinate (TOPROL-XL) 100 MG 24 hr tablet [Pharmacy Med Name: Metoprolol Succinate  MG Oral Tablet Extended Release 24 Hour]; Take 1 tablet by mouth once daily  Dispense: 90 tablet; Refill: 0  - lisinopriL (PRINIVIL,ZESTRIL) 10 MG tablet [Pharmacy Med Name: Lisinopril 10 MG Oral Tablet]; Take 1 tablet  by mouth once daily  Dispense: 90 tablet; Refill: 0  - rosuvastatin (CRESTOR) 20 MG tablet [Pharmacy Med Name: Rosuvastatin Calcium 20 MG Oral Tablet]; TAKE 1 TABLET BY MOUTH AT BEDTIME  Dispense: 90 tablet; Refill: 0    4. Ischemic cardiomyopathy  - metoprolol succinate (TOPROL-XL) 100 MG 24 hr tablet [Pharmacy Med Name: Metoprolol Succinate  MG Oral Tablet Extended Release 24 Hour]; Take 1 tablet by mouth once daily  Dispense: 90 tablet; Refill: 0  - lisinopriL (PRINIVIL,ZESTRIL) 10 MG tablet [Pharmacy Med Name: Lisinopril 10 MG Oral Tablet]; Take 1 tablet by mouth once daily  Dispense: 90 tablet; Refill: 0  - rosuvastatin (CRESTOR) 20 MG tablet [Pharmacy Med Name: Rosuvastatin Calcium 20 MG Oral Tablet]; TAKE 1 TABLET BY MOUTH AT BEDTIME  Dispense: 90 tablet; Refill: 0    If protocol passes may refill for 12 months if within 3 months of last provider visit (or a total of 15 months).             Passed - Serum Potassium in past 12 months     No results found for: [id number]          Passed - Blood pressure filed in past 12 months     BP Readings from Last 1 Encounters:   09/04/19 134/74             Passed - Serum Creatinine in past 12 months     Creatinine   Date Value Ref Range Status   09/04/2019 0.97 0.70 - 1.30 mg/dL Final                rosuvastatin (CRESTOR) 20 MG tablet [Pharmacy Med Name: Rosuvastatin Calcium 20 MG Oral Tablet] 90 tablet 0     Sig: TAKE 1 TABLET BY MOUTH AT BEDTIME       Statins Refill Protocol (Hmg CoA Reductase Inhibitors) Passed - 9/1/2020  2:42 PM        Passed - PCP or prescribing provider visit in past 12 months      Last office visit with prescriber/PCP: 9/4/2019 Paz Lucero MD OR same dept: 9/4/2019 Paz Lucero MD OR same specialty: 9/4/2019 Paz Lucero MD  Last physical: Visit date not found Last MTM visit: Visit date not found   Next visit within 3 mo: Visit date not found  Next physical within 3 mo: Visit date not found  Prescriber OR PCP:  Paz Lucero MD  Last diagnosis associated with med order: 1. Acute non-ST elevation myocardial infarction (NSTEMI) (H)  - metoprolol succinate (TOPROL-XL) 100 MG 24 hr tablet [Pharmacy Med Name: Metoprolol Succinate  MG Oral Tablet Extended Release 24 Hour]; Take 1 tablet by mouth once daily  Dispense: 90 tablet; Refill: 0  - lisinopriL (PRINIVIL,ZESTRIL) 10 MG tablet [Pharmacy Med Name: Lisinopril 10 MG Oral Tablet]; Take 1 tablet by mouth once daily  Dispense: 90 tablet; Refill: 0  - rosuvastatin (CRESTOR) 20 MG tablet [Pharmacy Med Name: Rosuvastatin Calcium 20 MG Oral Tablet]; TAKE 1 TABLET BY MOUTH AT BEDTIME  Dispense: 90 tablet; Refill: 0    2. S/P coronary artery stent placement  - metoprolol succinate (TOPROL-XL) 100 MG 24 hr tablet [Pharmacy Med Name: Metoprolol Succinate  MG Oral Tablet Extended Release 24 Hour]; Take 1 tablet by mouth once daily  Dispense: 90 tablet; Refill: 0  - lisinopriL (PRINIVIL,ZESTRIL) 10 MG tablet [Pharmacy Med Name: Lisinopril 10 MG Oral Tablet]; Take 1 tablet by mouth once daily  Dispense: 90 tablet; Refill: 0  - rosuvastatin (CRESTOR) 20 MG tablet [Pharmacy Med Name: Rosuvastatin Calcium 20 MG Oral Tablet]; TAKE 1 TABLET BY MOUTH AT BEDTIME  Dispense: 90 tablet; Refill: 0    3. Exercise-induced angina (H)  - metoprolol succinate (TOPROL-XL) 100 MG 24 hr tablet [Pharmacy Med Name: Metoprolol Succinate  MG Oral Tablet Extended Release 24 Hour]; Take 1 tablet by mouth once daily  Dispense: 90 tablet; Refill: 0  - lisinopriL (PRINIVIL,ZESTRIL) 10 MG tablet [Pharmacy Med Name: Lisinopril 10 MG Oral Tablet]; Take 1 tablet by mouth once daily  Dispense: 90 tablet; Refill: 0  - rosuvastatin (CRESTOR) 20 MG tablet [Pharmacy Med Name: Rosuvastatin Calcium 20 MG Oral Tablet]; TAKE 1 TABLET BY MOUTH AT BEDTIME  Dispense: 90 tablet; Refill: 0    4. Ischemic cardiomyopathy  - metoprolol succinate (TOPROL-XL) 100 MG 24 hr tablet [Pharmacy Med Name: Metoprolol  Succinate  MG Oral Tablet Extended Release 24 Hour]; Take 1 tablet by mouth once daily  Dispense: 90 tablet; Refill: 0  - lisinopriL (PRINIVIL,ZESTRIL) 10 MG tablet [Pharmacy Med Name: Lisinopril 10 MG Oral Tablet]; Take 1 tablet by mouth once daily  Dispense: 90 tablet; Refill: 0  - rosuvastatin (CRESTOR) 20 MG tablet [Pharmacy Med Name: Rosuvastatin Calcium 20 MG Oral Tablet]; TAKE 1 TABLET BY MOUTH AT BEDTIME  Dispense: 90 tablet; Refill: 0    If protocol passes may refill for 12 months if within 3 months of last provider visit (or a total of 15 months).

## 2021-06-12 NOTE — PROGRESS NOTES
"S:  Patient presents today after not being to a doctor for quite some time.  Status post stent placement 2018.  He has not seen cardiology as well.  He has poor follow-up.    He notes occasional \"twinges of pain\" at the incision site of both inguinal hernias.  He has no difficulty with bowel or bladder and notes that the twinges only last for very short period time.  Is been occurring for 5 months off and on.    Patient also notes lesions on his head which are irritated and he thinks they are bug bites after [place] in [place].    He does not exercise, continues to work, and knows that his weight is too high.    He has been without medications for 2 weeks.        O:   Blood pressure 160/84, pulse 79, respiration 16  Constitutional:    --Vitals as above  --No acute distress  Eyes-  --Sclera noninjected  --Lids and conjunctiva normal  ENT-  --TMs clear  --Sclera noninjected  --Pharynx not erythematous  Neck-  --Neck supple with no cervical lymphadenopathy  Lungs-  --Clear to Auscultation  Heart-  --Regular rate and rhythm  Abdomen--  --Soft, non-tender, non-distended and inguinal hernia scars intact but no evidence of inguinal hernia.  -  --Normal external genitalia normal size and consistency of testicles.  No evidence of inguinal hernia.  Skin-  --Pink and dry except for 2 mildly infected lesions approximately 6 mm in size on the axilla proper portion of the head both left and right.  Psych-  --Alert and oriented  --Normal affect      A:   Hypertension  Over nourishment  Heart disease  Bug bites on scalp    P:   Keflex 250 4 times daily for 7 days for bug bites  Follow-up with cardiology in order placed  Refilled hypertension medications he should monitor blood pressure  Encourage weight loss  Encourage exercise  Labs today  Follow-up in 6 months  "

## 2021-06-13 NOTE — PATIENT INSTRUCTIONS - HE
Hold crestor for one month and then restart to see if the culprit for the joint pain.     If it's the culprit - try Coenzyme Q 10 200-400mg daily to help prevent muscle aches then restart crestor 20mg.    If not the culprit (meaning the pain does not go away) - restart crestor and to help joint pain - can consider glucosamine chondroitin once a day     Check blood pressure at baseline and again after walking up flight of stairs    Start gemfibrozil 600mg twice a day to lower triglycerides and lower your carbohydrate intake (heaven)    Check cholesterol in 2 months    Follow up with sleep study people regarding sleep apnea and echo

## 2021-06-16 NOTE — TELEPHONE ENCOUNTER
Refill Approved    Rx renewed per Medication Renewal Policy. Medication was last renewed on 10/5/20.    Javier eRcio, [facility] Triage/Med Refill 3/31/2021     Requested Prescriptions   Pending Prescriptions Disp Refills     metoprolol succinate (TOPROL-XL) 100 MG 24 hr tablet [Pharmacy Med Name: Metoprolol Succinate  MG Oral Tablet Extended Release 24 Hour] 90 tablet 0     Sig: Take 1 tablet by mouth once daily       Beta-Blockers Refill Protocol Passed - 3/29/2021  6:28 PM        Passed - PCP or prescribing provider visit in past 12 months or next 3 months     Last office visit with prescriber/PCP: 10/5/2020 Paz Lucero MD OR same dept: 10/5/2020 Paz Lucero MD OR same specialty: 10/5/2020 Paz Lucero MD  Last physical: Visit date not found Last MTM visit: Visit date not found   Next visit within 3 mo: Visit date not found  Next physical within 3 mo: Visit date not found  Prescriber OR PCP: Paz Lucero MD  Last diagnosis associated with med order: 1. Acute non-ST elevation myocardial infarction (NSTEMI) (H)  - metoprolol succinate (TOPROL-XL) 100 MG 24 hr tablet [Pharmacy Med Name: Metoprolol Succinate  MG Oral Tablet Extended Release 24 Hour]; Take 1 tablet by mouth once daily  Dispense: 90 tablet; Refill: 0  - lisinopriL (PRINIVIL,ZESTRIL) 10 MG tablet [Pharmacy Med Name: Lisinopril 10 MG Oral Tablet]; Take 1 tablet by mouth once daily  Dispense: 90 tablet; Refill: 0  - rosuvastatin (CRESTOR) 20 MG tablet [Pharmacy Med Name: Rosuvastatin Calcium 20 MG Oral Tablet]; TAKE 1 TABLET BY MOUTH AT BEDTIME  Dispense: 90 tablet; Refill: 0    2. S/P coronary artery stent placement  - metoprolol succinate (TOPROL-XL) 100 MG 24 hr tablet [Pharmacy Med Name: Metoprolol Succinate  MG Oral Tablet Extended Release 24 Hour]; Take 1 tablet by mouth once daily  Dispense: 90 tablet; Refill: 0  - lisinopriL (PRINIVIL,ZESTRIL) 10 MG tablet [Pharmacy Med Name: Lisinopril  10 MG Oral Tablet]; Take 1 tablet by mouth once daily  Dispense: 90 tablet; Refill: 0  - rosuvastatin (CRESTOR) 20 MG tablet [Pharmacy Med Name: Rosuvastatin Calcium 20 MG Oral Tablet]; TAKE 1 TABLET BY MOUTH AT BEDTIME  Dispense: 90 tablet; Refill: 0    3. Exercise-induced angina (H)  - metoprolol succinate (TOPROL-XL) 100 MG 24 hr tablet [Pharmacy Med Name: Metoprolol Succinate  MG Oral Tablet Extended Release 24 Hour]; Take 1 tablet by mouth once daily  Dispense: 90 tablet; Refill: 0  - lisinopriL (PRINIVIL,ZESTRIL) 10 MG tablet [Pharmacy Med Name: Lisinopril 10 MG Oral Tablet]; Take 1 tablet by mouth once daily  Dispense: 90 tablet; Refill: 0  - rosuvastatin (CRESTOR) 20 MG tablet [Pharmacy Med Name: Rosuvastatin Calcium 20 MG Oral Tablet]; TAKE 1 TABLET BY MOUTH AT BEDTIME  Dispense: 90 tablet; Refill: 0    4. Ischemic cardiomyopathy  - metoprolol succinate (TOPROL-XL) 100 MG 24 hr tablet [Pharmacy Med Name: Metoprolol Succinate  MG Oral Tablet Extended Release 24 Hour]; Take 1 tablet by mouth once daily  Dispense: 90 tablet; Refill: 0  - lisinopriL (PRINIVIL,ZESTRIL) 10 MG tablet [Pharmacy Med Name: Lisinopril 10 MG Oral Tablet]; Take 1 tablet by mouth once daily  Dispense: 90 tablet; Refill: 0  - rosuvastatin (CRESTOR) 20 MG tablet [Pharmacy Med Name: Rosuvastatin Calcium 20 MG Oral Tablet]; TAKE 1 TABLET BY MOUTH AT BEDTIME  Dispense: 90 tablet; Refill: 0    If protocol passes may refill for 12 months if within 3 months of last provider visit (or a total of 15 months).             Passed - Blood pressure filed in past 12 months     BP Readings from Last 1 Encounters:   01/04/21 153/76                lisinopriL (PRINIVIL,ZESTRIL) 10 MG tablet [Pharmacy Med Name: Lisinopril 10 MG Oral Tablet] 90 tablet 0     Sig: Take 1 tablet by mouth once daily       Ace Inhibitors Refill Protocol Passed - 3/29/2021  6:28 PM        Passed - PCP or prescribing provider visit in past 12 months       Last office  visit with prescriber/PCP: 10/5/2020 Paz Lucero MD OR same dept: 10/5/2020 Paz Lucero MD OR same specialty: 10/5/2020 Paz Lucero MD  Last physical: Visit date not found Last MTM visit: Visit date not found   Next visit within 3 mo: Visit date not found  Next physical within 3 mo: Visit date not found  Prescriber OR PCP: Paz Lucero MD  Last diagnosis associated with med order: 1. Acute non-ST elevation myocardial infarction (NSTEMI) (H)  - metoprolol succinate (TOPROL-XL) 100 MG 24 hr tablet [Pharmacy Med Name: Metoprolol Succinate  MG Oral Tablet Extended Release 24 Hour]; Take 1 tablet by mouth once daily  Dispense: 90 tablet; Refill: 0  - lisinopriL (PRINIVIL,ZESTRIL) 10 MG tablet [Pharmacy Med Name: Lisinopril 10 MG Oral Tablet]; Take 1 tablet by mouth once daily  Dispense: 90 tablet; Refill: 0  - rosuvastatin (CRESTOR) 20 MG tablet [Pharmacy Med Name: Rosuvastatin Calcium 20 MG Oral Tablet]; TAKE 1 TABLET BY MOUTH AT BEDTIME  Dispense: 90 tablet; Refill: 0    2. S/P coronary artery stent placement  - metoprolol succinate (TOPROL-XL) 100 MG 24 hr tablet [Pharmacy Med Name: Metoprolol Succinate  MG Oral Tablet Extended Release 24 Hour]; Take 1 tablet by mouth once daily  Dispense: 90 tablet; Refill: 0  - lisinopriL (PRINIVIL,ZESTRIL) 10 MG tablet [Pharmacy Med Name: Lisinopril 10 MG Oral Tablet]; Take 1 tablet by mouth once daily  Dispense: 90 tablet; Refill: 0  - rosuvastatin (CRESTOR) 20 MG tablet [Pharmacy Med Name: Rosuvastatin Calcium 20 MG Oral Tablet]; TAKE 1 TABLET BY MOUTH AT BEDTIME  Dispense: 90 tablet; Refill: 0    3. Exercise-induced angina (H)  - metoprolol succinate (TOPROL-XL) 100 MG 24 hr tablet [Pharmacy Med Name: Metoprolol Succinate  MG Oral Tablet Extended Release 24 Hour]; Take 1 tablet by mouth once daily  Dispense: 90 tablet; Refill: 0  - lisinopriL (PRINIVIL,ZESTRIL) 10 MG tablet [Pharmacy Med Name: Lisinopril 10 MG Oral Tablet];  Take 1 tablet by mouth once daily  Dispense: 90 tablet; Refill: 0  - rosuvastatin (CRESTOR) 20 MG tablet [Pharmacy Med Name: Rosuvastatin Calcium 20 MG Oral Tablet]; TAKE 1 TABLET BY MOUTH AT BEDTIME  Dispense: 90 tablet; Refill: 0    4. Ischemic cardiomyopathy  - metoprolol succinate (TOPROL-XL) 100 MG 24 hr tablet [Pharmacy Med Name: Metoprolol Succinate  MG Oral Tablet Extended Release 24 Hour]; Take 1 tablet by mouth once daily  Dispense: 90 tablet; Refill: 0  - lisinopriL (PRINIVIL,ZESTRIL) 10 MG tablet [Pharmacy Med Name: Lisinopril 10 MG Oral Tablet]; Take 1 tablet by mouth once daily  Dispense: 90 tablet; Refill: 0  - rosuvastatin (CRESTOR) 20 MG tablet [Pharmacy Med Name: Rosuvastatin Calcium 20 MG Oral Tablet]; TAKE 1 TABLET BY MOUTH AT BEDTIME  Dispense: 90 tablet; Refill: 0    If protocol passes may refill for 12 months if within 3 months of last provider visit (or a total of 15 months).             Passed - Serum Potassium in past 12 months     Lab Results   Component Value Date    Potassium 4.0 10/05/2020             Passed - Blood pressure filed in past 12 months     BP Readings from Last 1 Encounters:   01/04/21 153/76             Passed - Serum Creatinine in past 12 months     Creatinine   Date Value Ref Range Status   10/05/2020 1.05 0.70 - 1.30 mg/dL Final                rosuvastatin (CRESTOR) 20 MG tablet [Pharmacy Med Name: Rosuvastatin Calcium 20 MG Oral Tablet] 90 tablet 0     Sig: TAKE 1 TABLET BY MOUTH AT BEDTIME       Statins Refill Protocol (Hmg CoA Reductase Inhibitors) Passed - 3/29/2021  6:28 PM        Passed - PCP or prescribing provider visit in past 12 months      Last office visit with prescriber/PCP: 10/5/2020 Paz Lucero MD OR same dept: 10/5/2020 Paz Lucero MD OR same specialty: 10/5/2020 Paz Lucero MD  Last physical: Visit date not found Last MTM visit: Visit date not found   Next visit within 3 mo: Visit date not found  Next physical within  3 mo: Visit date not found  Prescriber OR PCP: Paz Lucero MD  Last diagnosis associated with med order: 1. Acute non-ST elevation myocardial infarction (NSTEMI) (H)  - metoprolol succinate (TOPROL-XL) 100 MG 24 hr tablet [Pharmacy Med Name: Metoprolol Succinate  MG Oral Tablet Extended Release 24 Hour]; Take 1 tablet by mouth once daily  Dispense: 90 tablet; Refill: 0  - lisinopriL (PRINIVIL,ZESTRIL) 10 MG tablet [Pharmacy Med Name: Lisinopril 10 MG Oral Tablet]; Take 1 tablet by mouth once daily  Dispense: 90 tablet; Refill: 0  - rosuvastatin (CRESTOR) 20 MG tablet [Pharmacy Med Name: Rosuvastatin Calcium 20 MG Oral Tablet]; TAKE 1 TABLET BY MOUTH AT BEDTIME  Dispense: 90 tablet; Refill: 0    2. S/P coronary artery stent placement  - metoprolol succinate (TOPROL-XL) 100 MG 24 hr tablet [Pharmacy Med Name: Metoprolol Succinate  MG Oral Tablet Extended Release 24 Hour]; Take 1 tablet by mouth once daily  Dispense: 90 tablet; Refill: 0  - lisinopriL (PRINIVIL,ZESTRIL) 10 MG tablet [Pharmacy Med Name: Lisinopril 10 MG Oral Tablet]; Take 1 tablet by mouth once daily  Dispense: 90 tablet; Refill: 0  - rosuvastatin (CRESTOR) 20 MG tablet [Pharmacy Med Name: Rosuvastatin Calcium 20 MG Oral Tablet]; TAKE 1 TABLET BY MOUTH AT BEDTIME  Dispense: 90 tablet; Refill: 0    3. Exercise-induced angina (H)  - metoprolol succinate (TOPROL-XL) 100 MG 24 hr tablet [Pharmacy Med Name: Metoprolol Succinate  MG Oral Tablet Extended Release 24 Hour]; Take 1 tablet by mouth once daily  Dispense: 90 tablet; Refill: 0  - lisinopriL (PRINIVIL,ZESTRIL) 10 MG tablet [Pharmacy Med Name: Lisinopril 10 MG Oral Tablet]; Take 1 tablet by mouth once daily  Dispense: 90 tablet; Refill: 0  - rosuvastatin (CRESTOR) 20 MG tablet [Pharmacy Med Name: Rosuvastatin Calcium 20 MG Oral Tablet]; TAKE 1 TABLET BY MOUTH AT BEDTIME  Dispense: 90 tablet; Refill: 0    4. Ischemic cardiomyopathy  - metoprolol succinate (TOPROL-XL) 100 MG 24  hr tablet [Pharmacy Med Name: Metoprolol Succinate  MG Oral Tablet Extended Release 24 Hour]; Take 1 tablet by mouth once daily  Dispense: 90 tablet; Refill: 0  - lisinopriL (PRINIVIL,ZESTRIL) 10 MG tablet [Pharmacy Med Name: Lisinopril 10 MG Oral Tablet]; Take 1 tablet by mouth once daily  Dispense: 90 tablet; Refill: 0  - rosuvastatin (CRESTOR) 20 MG tablet [Pharmacy Med Name: Rosuvastatin Calcium 20 MG Oral Tablet]; TAKE 1 TABLET BY MOUTH AT BEDTIME  Dispense: 90 tablet; Refill: 0    If protocol passes may refill for 12 months if within 3 months of last provider visit (or a total of 15 months).

## 2021-06-17 NOTE — PROGRESS NOTES
Venancio Tolentino  [address] [place] [facility] [address]  [phone] (home)     Primary cardiologist:  Dr. Wilburn  PCP:  No Primary Care Provider  Procedure:  Coronary angiogram with possible PCI  H&P completed by:  4/24/18  Case MD:  Dr. Wilburn   Admit date and time:  5/4/18 at 8:30 am.   Case start time:  10:30 am.   Ordering MD:  Dr. Wilburn  Diagnosis:  CAD  Anticoagulation: None  CPAP: Yes  Bypass Grafts: No  Renal Issues: No  Allergies: No Known Allergies  Diabetic?: No  Device?: No      Angiogram Teaching    Reason for Visit:  Patient seen for pre-procedure education in preparation for: Coronary angiogram with possible PCI    Procedure Prep:  Cardiologist note dated: 4/24/18  EKG results obtained, dated: Morning of procedure  Pertinent test results obtained - Viewable in Epic, dated: Angiogram with stents placed 11/2015  Hemogram results obtained: Morning of procedure.   Basic Metabolic Panel results obtained: Morning of procedure.   Lipid Profile results obtained: Morning of procedure.     Patient Education  Explained indications/risks for diagnostic evaluation, including one or more of the following:  coronary angiogram, percutaneous arteritomy closure, less than 0.1% of acute myocardial infarction and CVA or death or any of the following to the degree that it could threaten life:  allergic reaction, arrhythmia, renal failure, hemorrhage, thrombosis and infection  Explained indications/risks for therapeutic interventions, including one or more of the following: stent, 2% or less risk of MI, less than 1% risk of CVA, emergency heart surgery, death, less than 4 % risk of vascular injury requiring surgical repair or blood transfusion, 20-30% risk of restonosis with a bare metal stent and less than 10% risk of restonosis with a drug-eluting stent.  These risks are in addition to baseline risks associated with a Diagnostic Evaluation.  Patient states understanding of procedure and risks and agrees to  proceed.    Pre-procedure instructions  Patient instructed to be NPO after midnight.  Patient instructed to arrange for transportation home following procedure.  Patient instructed to have a responsible adult with them for 24 hours post-procedure.  Post-procedure follow up process.  Conscious sedation discussed.  The patient was given the pre-procedure letter (If requested) on 4/24/18    Pre-procedure medication instructions  Patient instructed on antiplatelet medication.  Continue medications as scheduled, with a small amount of water on the day of the procedure unless indicated.  Patient instructed to take 325 mg of Aspirin am of procedure: Yes  Other medication: instructed to take lisinopril and metoprolol and to hold all other medications, vitamins, and minerals a.m. of the procedure.    *PATIENTS RECORDS AVAILABLE IN [facility] UNLESS OTHERWISE INDICATED*    *Order set was entered on this date: 4/24/18      Patient Active Problem List   Diagnosis     Essential Hypercholesterolemia     Essential Hypertension     Acute Myocardial Infarction     Coronary Artery Disease     Alcohol Dependence In Remission     Cardiomyopathy     CAD (coronary artery disease)       Current Outpatient Prescriptions   Medication Sig Dispense Refill     albuterol (PROAIR HFA;PROVENTIL HFA;VENTOLIN HFA) 90 mcg/actuation inhaler Inhale 2 puffs 3 (three) times a day as needed for wheezing. 1 Inhaler 0     aspirin 81 mg chewable tablet Chew 2 tablets (162 mg total) daily.  0     benzonatate (TESSALON) 200 MG capsule Take 1 capsule (200 mg total) by mouth 3 (three) times a day as needed for cough. 30 capsule 0     lisinopril (PRINIVIL,ZESTRIL) 10 MG tablet TAKE ONE TABLET BY MOUTH ONCE DAILY 90 tablet 1     metoprolol succinate (TOPROL-XL) 100 MG 24 hr tablet TAKE ONE TABLET BY MOUTH ONCE DAILY 90 tablet 1     omega-3 fatty acids (FISH OIL) 500 mg cap Take 1,000 mg/day by mouth daily.       rosuvastatin (CRESTOR) 20 MG tablet TAKE ONE TABLET BY  MOUTH ONCE DAILY AT BEDTIME 90 tablet 1     No current facility-administered medications for this visit.        No Known Allergies    Plan  Patient's wife, Linda, will drive patient home from procedure and stay with him for 24 hours post procedure.   Patient ready for procedure    CATRACHITO Bhatia

## 2021-06-17 NOTE — PROGRESS NOTES
[facility] Note    Assessment/Plan:    Venancio Tolentino is a 56 y.o. old male with:  1. CAD s/p MI 2014 and repeat PCI `1/2015 off statin, now one year later - with return of angina - discussed risks, benefits, goals and alternative for angiogrpahy/PCI and he would like to proceed.  2. CM - EF returned to normal, cont metoprolol/lisniopril, restart CPAP for ANISA        Dr. Frank Wilburn  [facility]  [phone]  ______________________________________________________________________     Subjective:     I had the opportunity to see Venancio Tolentino at the [facility]. Venancio Tolentino is a 56 y.o. male with a known history of MI s/p PCI to RCA and LAD 2014 and 2015,  ANISA (off CPAP for a year) with progressive dyspnea on exertion and some chest pressure.  If he shovels and rests he's ok, but if moves around he gets some pressure,.  When he drives his left arm fallsasleep or when he wakes up with tinglein g in the right arm. No GI/ bleeding, on rosuvastatin and tolerating 20mg.  No syncopal events, PND, orthopnea, intermittently once a mo feels heart race for few seconds.   Returned to old diet - junk food, [employer].   Work ok - .   ______________________________________________________________________      Review of Systems:                                            Problem List:  Patient Active Problem List   Diagnosis     Essential Hypercholesterolemia     Essential Hypertension     Acute Myocardial Infarction     Coronary Artery Disease     Alcohol Dependence In Remission     Cardiomyopathy     Medical History:  Past Medical History:   Diagnosis Date     Coronary artery disease      Surgical History:  Past Surgical History:   Procedure Laterality Date     HERNIA REPAIR       Social History:  No pertinent social hx related to patient's chief complaint other than above in subjective        Family History:  No pertinent family hx related to patient's  chief complaint other than above in subjective      Allergies:  No Known Allergies    Medications:  Current Outpatient Prescriptions   Medication Sig Dispense Refill     albuterol (PROAIR HFA;PROVENTIL HFA;VENTOLIN HFA) 90 mcg/actuation inhaler Inhale 2 puffs 3 (three) times a day as needed for wheezing. 1 Inhaler 0     aspirin 81 mg chewable tablet Chew 2 tablets (162 mg total) daily.  0     benzonatate (TESSALON) 200 MG capsule Take 1 capsule (200 mg total) by mouth 3 (three) times a day as needed for cough. 30 capsule 0     lisinopril (PRINIVIL,ZESTRIL) 10 MG tablet TAKE ONE TABLET BY MOUTH ONCE DAILY 90 tablet 1     metoprolol succinate (TOPROL-XL) 100 MG 24 hr tablet TAKE ONE TABLET BY MOUTH ONCE DAILY 90 tablet 1     omega-3 fatty acids (FISH OIL) 500 mg cap Take 1,000 mg/day by mouth daily.       rosuvastatin (CRESTOR) 20 MG tablet TAKE ONE TABLET BY MOUTH ONCE DAILY AT BEDTIME 90 tablet 1     No current facility-administered medications for this visit.        Objective:   Vital signs:  There were no vitals taken for this visit.      Physical Exam:    GENERAL APPEARANCE: Alert, cooperative and in no acute distress.  HEENT: No scleral icterus. No Xanthelasma. Oral mucuos membranes pink and moist.  NECK: JVP<6cm. No Hepatojugular reflux. Thyroid not visualized  CHEST: clear to auscultation  CARDIOVASCULAR: S1, S2 without murmur ,clicks or rubs. Brachial, radial and posterior tibial pulses are intact and symetric. No carotid bruits noted.    ABDOMEN: Nontender. BS+. No bruits.  EXTREMITIES: No cyanosis, clubbing or edema.    Lab Results:  LIPIDS:  Lab Results   Component Value Date    CHOL 123 01/09/2017    CHOL 141 02/05/2016    CHOL 143 08/31/2015     Lab Results   Component Value Date    HDL 42 02/05/2016    HDL 30 (L) 06/08/2015    HDL 36 (L) 12/11/2014     Lab Results   Component Value Date    LDLCALC 56 02/05/2016    LDLCALC 28 06/08/2015    LDLCALC 44 12/11/2014     Lab Results   Component Value Date     TRIG 215 (H) 02/05/2016    TRIG 150 (H) 06/08/2015    TRIG 117 12/11/2014     No components found for: CHOLHDL    BMP:  Lab Results   Component Value Date    CREATININE 0.88 01/09/2017    BUN 14 01/09/2017     01/09/2017    K 4.0 01/09/2017     01/09/2017    CO2 27 01/09/2017         Frank Wilburn MD  [facility], [facility]  [phone]

## 2021-06-19 NOTE — LETTER
Letter by Frank Wilburn MD at      Author: Frank Wilburn MD Service: -- Author Type: --    Filed:  Encounter Date: 8/30/2019 Status: (Other)         Venancio Tolentino  [address]  [facility] [address]      September 3, 2019      Dear Venancio,    As a valued [facility] patient, your healthcare needs are our priority.  Your health care team has determined that you are due for an appointment regarding your medication.    To help prevent delays in your care, please call the [facility] at [phone]    .    We look forward to partnering with you to achieve optimal health and wellbeing.    Sincerely,  Your care team at [facility] and [facility]

## 2021-06-20 NOTE — LETTER
Letter by Paz Lucero MD at      Author: Paz Lucero MD Service: -- Author Type: --    Filed:  Encounter Date: 10/6/2020 Status: (Other)         Venancio Tolentino  [address] [facility] [address]            October 6, 2020        Dear Mr. Tolentino,        Below are the results from your recent visit:    Resulted Orders   HM2(CBC w/o Differential)   Result Value Ref Range    WBC 6.5 4.0 - 11.0 thou/uL    RBC 4.75 4.40 - 6.20 mill/uL    Hemoglobin 15.8 14.0 - 18.0 g/dL    Hematocrit 45.8 40.0 - 54.0 %    MCV 96 80 - 100 fL    MCH 33.2 27.0 - 34.0 pg    MCHC 34.4 32.0 - 36.0 g/dL    RDW 11.2 11.0 - 14.5 %    Platelets 320 140 - 440 thou/uL    MPV 6.5 (L) 7.0 - 10.0 fL   Basic Metabolic Panel   Result Value Ref Range    Sodium 139 136 - 145 mmol/L    Potassium 4.0 3.5 - 5.0 mmol/L    Chloride 104 98 - 107 mmol/L    CO2 25 22 - 31 mmol/L    Anion Gap, Calculation 10 5 - 18 mmol/L    Glucose 91 70 - 125 mg/dL    Calcium 8.9 8.5 - 10.5 mg/dL    BUN 13 8 - 22 mg/dL    Creatinine 1.05 0.70 - 1.30 mg/dL    GFR MDRD Af Amer >60 >60 mL/min/1.73m2    GFR MDRD Non Af Amer >60 >60 mL/min/1.73m2    Narrative    Fasting Glucose reference range is 70-99 mg/dL per  American Diabetes Association (ADA) guidelines.   Lipid Cascade   Result Value Ref Range    Cholesterol 199 <=199 mg/dL    Triglycerides 512 (H) <=149 mg/dL    HDL Cholesterol 38 (L) >=40 mg/dL    LDL Calculated        Comment:      Invalid, Triglycerides >400    Patient Fasting > 8hrs? No    Glycosylated Hemoglobin A1c   Result Value Ref Range    Hemoglobin A1c 5.4 <=5.6 %      Comment:      Normal <5.7% Prediabete 5.7-6.4% Diabletes 6.5% or higher - adopted from ADA consensus guidelines   PSA (Prostatic-Specific Antigen), Annual Screen   Result Value Ref Range    PSA 0.5 0.0 - 4.5 ng/mL    Narrative    Method is Abbott Prostate-Specific Antigen (PSA)  Standard-WHO 1st International (90:10)         Yifan, your laboratory results are normal.   That is good news.  Thank you for coming in to visit.  We should visit again in 1 year.      Sincerely,        TU Lucero MD, SATYA  [facility]  [phone]

## 2021-06-20 NOTE — LETTER
Letter by Paz Lucero MD at      Author: Paz Lucero MD Service: -- Author Type: --    Filed:  Encounter Date: 10/6/2020 Status: (Other)         Venancio Tolentino  [address] [facility] [address]            October 7, 2020        Dear Mr. Tolentino,        Below are the results from your recent visit:    Resulted Orders   HM2(CBC w/o Differential)   Result Value Ref Range    WBC 6.5 4.0 - 11.0 thou/uL    RBC 4.75 4.40 - 6.20 mill/uL    Hemoglobin 15.8 14.0 - 18.0 g/dL    Hematocrit 45.8 40.0 - 54.0 %    MCV 96 80 - 100 fL    MCH 33.2 27.0 - 34.0 pg    MCHC 34.4 32.0 - 36.0 g/dL    RDW 11.2 11.0 - 14.5 %    Platelets 320 140 - 440 thou/uL    MPV 6.5 (L) 7.0 - 10.0 fL   Basic Metabolic Panel   Result Value Ref Range    Sodium 139 136 - 145 mmol/L    Potassium 4.0 3.5 - 5.0 mmol/L    Chloride 104 98 - 107 mmol/L    CO2 25 22 - 31 mmol/L    Anion Gap, Calculation 10 5 - 18 mmol/L    Glucose 91 70 - 125 mg/dL    Calcium 8.9 8.5 - 10.5 mg/dL    BUN 13 8 - 22 mg/dL    Creatinine 1.05 0.70 - 1.30 mg/dL    GFR MDRD Af Amer >60 >60 mL/min/1.73m2    GFR MDRD Non Af Amer >60 >60 mL/min/1.73m2    Narrative    Fasting Glucose reference range is 70-99 mg/dL per  American Diabetes Association (ADA) guidelines.   Lipid Cascade   Result Value Ref Range    Cholesterol 199 <=199 mg/dL    Triglycerides 512 (H) <=149 mg/dL    HDL Cholesterol 38 (L) >=40 mg/dL    LDL Calculated        Comment:      Invalid, Triglycerides >400    Patient Fasting > 8hrs? No    Glycosylated Hemoglobin A1c   Result Value Ref Range    Hemoglobin A1c 5.4 <=5.6 %      Comment:      Normal <5.7% Prediabete 5.7-6.4% Diabletes 6.5% or higher - adopted from ADA consensus guidelines   PSA (Prostatic-Specific Antigen), Annual Screen   Result Value Ref Range    PSA 0.5 0.0 - 4.5 ng/mL    Narrative    Method is Abbott Prostate-Specific Antigen (PSA)  Standard-WHO 1st International (90:10)   Custom LDL Cholesterol, Direct   Result Value Ref  Range    Direct  <=129 mg/dl         Yifan, your laboratory results look good.  Your triglycerides are a little bit elevated but I would not take medicine for that alone.  As I mentioned in the exam room, please follow-up with cardiology concerning your stent.    Thank you for coming into visit.  You should return in 1 year for recheck.    Sincerely,        TU Lucero MD, SATYA  [facility]  [phone]

## 2021-06-26 ENCOUNTER — HEALTH MAINTENANCE LETTER (OUTPATIENT)
Age: 61
End: 2021-06-26

## 2021-06-26 NOTE — PROGRESS NOTES
Progress Notes by Delia Estevez CNP at 7/14/2018  9:20 AM     Author: Delia Estevez CNP Service: -- Author Type: Nurse Practitioner    Filed: 7/14/2018 12:05 PM Encounter Date: 7/14/2018 Status: Signed    : Delia Estevez CNP (Nurse Practitioner)       ASSESSMENT:   1. Hordeolum externum of right lower eyelid  erythromycin ophthalmic ointment       PLAN:  58-year-old male presents for evaluation of a nodule to his right lower eyelid for the past 3 weeks.  This is not painful.  It is intermittently itchy.  He has not tried any treatment for this.  History and exam today are most consistent with an external hordeolum, however given the length of time it has been present to also question initially exam.  It is not necessarily firm on my exam today.  At this point, I have recommended warm packing 4 times daily and I did prescribe erythromycin ophthalmic ointment as well.  I recommended that the patient follow-up with [place] either Monday or Tuesday (today being Saturday) for further evaluation of this.  Should he develop fevers, signs of cellulitis, eye pain, or visual changes he will present immediately to the emergency department.    I discussed red flag symptoms, return precautions to clinic/ER and follow up care with patient/parent.  Expected clinical course, symptomatic cares advised. Questions answered. Patient/parent amenable with plan.    Patient Instructions:  Patient Instructions   Please hot pack your eye as we discussed.  Warm a washcloth under hot water and hold over your eye.  As you feel this start to cool, rewarm it.  Hold over your eye for 15 minutes.  Do this 4 times daily.  I did send in a prescription for an antibiotic ointment since this has been there for so long.  Please see [facility] next week to have them look at this.  They can be reached at [phone].  If you develop fever, eye pain or vision changes, please go right to the ER.  Vasyl (or Stye)  ALEXANDRA  sty is an infection of the oil gland of the eyelid. It may develop into a small pocket of pus (an abscess). This can cause pain, redness, and swelling. In early stages, a sty is treated with antibiotic cream, eye drops, or a small towel soaked in warm water (a warm compress). More severe cases may need to be opened and drained by a healthcare provider.  Home care    Eye drops or ointment are usually prescribed to treat the infection. Use these as directed.     Artificial tears may also be used to lubricate the eye and make it more comfortable. You can buy these over the counter without a prescription. Talk with your healthcare provider before using any over-the-counter treatment for a sty.    Apply a warm, damp towel to the affected eye for at least 5 minutes, 3 to 4 times a day for a week. Warm compresses open the pores and speed the healing. But if the compresses are too hot, they may burn your eyelid.    Sometimes the sty will drain with this treatment alone. If this happens, keep using the antibiotic until all the redness and swelling are gone.    Wash your hands before and after touching the infected eyelid to avoid spreading the infection.    Dont squeeze or try to break open the sty.  Follow-up care  Follow up with your healthcare provider, or as advised.   When to seek medical advice  Call your healthcare provider right away if any of these occur:    Increase in swelling or redness around the eyelid after 48 to 72 hours    Increase in eye pain or the eyelid blisters    Increase in warmth--the eyelid feels hot    Drainage of blood or thick pus from the sty    Blister on the eyelid    Inability to open the eyelid due to swelling    Fever of 100.4 F (38 C) or above, or as directed by your provider    Vision changes    Headache or stiff neck    The sty comes back  Date Last Reviewed: 8/1/2017 2000-2017 [employer]. [address] [place] [address], [place] [address]. All rights reserved. This information  is not intended as a substitute for professional medical care. Always follow your healthcare professional's instructions.            SUBJECTIVE:   Venancio Tolentino is a 58 y.o. male who presents today with 3 weeks of a painless nodule on his right lower eyelid.  This is intermittently itchy.  It has not drained anything.  There has not been redness of the skin surrounding the eyelid.  He has not had any fevers.  He has no eye pain.  He has had no visual changes, no photosensitivity.  No eye drainage or discharge, no crusting.  Has not tried any treatment for the nodule.      ROS:  Comprehensive 12 pt ROS completed, positives noted in HPI, otherwise negative.      Past Medical History:  Patient Active Problem List   Diagnosis   ? Essential Hypercholesterolemia   ? Essential Hypertension   ? Acute Myocardial Infarction   ? Coronary Artery Disease   ? Alcohol Dependence In Remission   ? Cardiomyopathy   ? CAD (coronary artery disease)   ? Angina of effort (H)       Surgical History:  Past Surgical History:   Procedure Laterality Date   ? CARDIAC CATHETERIZATION  2014   ? CARDIAC CATHETERIZATION  2015   ? CARDIAC CATHETERIZATION  05/04/2018   ? CORONARY STENT PLACEMENT      rca and lad   ? CV CORONARY ANGIOGRAM N/A 5/4/2018    Procedure: Coronary Angiogram;  Surgeon: Frank Wilburn MD;  Location: [facility];  Service:    ? CV LEFT HEART CATHETERIZATION WITH LEFT VENTRICULOGRAM N/A 5/4/2018    Procedure: Left Heart Catheterization with Left Ventriculogram;  Surgeon: Frank Wilburn MD;  Location: [facility];  Service:    ? HERNIA REPAIR             Family History:  Family History   Problem Relation Age of Onset   ? Snoring Father        Reviewed; Non-contributory    History   Smoking Status   ? Former Smoker   Smokeless Tobacco   ? Never Used           Current Medications:  Current Outpatient Prescriptions on File Prior to Visit   Medication Sig Dispense Refill   ? aspirin 81 mg  "chewable tablet Chew 2 tablets (162 mg total) daily.  0   ? lisinopril (PRINIVIL,ZESTRIL) 10 MG tablet TAKE ONE TABLET BY MOUTH ONCE DAILY 90 tablet 1   ? metoprolol succinate (TOPROL-XL) 100 MG 24 hr tablet TAKE ONE TABLET BY MOUTH ONCE DAILY 90 tablet 1   ? omega-3 fatty acids (FISH OIL) 500 mg cap Take 1,000 mg/day by mouth daily.     ? rosuvastatin (CRESTOR) 20 MG tablet TAKE ONE TABLET BY MOUTH ONCE DAILY AT BEDTIME 90 tablet 1     No current facility-administered medications on file prior to visit.        Allergies:   No Known Allergies    OBJECTIVE:   Vitals:    07/14/18 0933   BP: 132/68   Pulse: (!) 58   Resp: 16   Temp: 97.6  F (36.4  C)   TempSrc: Oral   SpO2: 96%   Weight: 161 lb (73 kg)   Height: 5' 5\" (1.651 m)     Physical exam reveals a pleasant 58 y.o. male.   Appears healthy, alert and cooperative. Non-toxic appearance.  Eyes:  No conjunctivitis, there is what appears to be a hordeolum to the right lower lid margin.  Th there is no true fluctuance.  There is no surrounding erythema.  There is no tenderness to palpation.  No erythema or tenderness of the periorbital space.  No ocular tenderness.  Pupils are equal and reactive, EOMs are intact.  No pain with eye movement.  No eye discharge or tearing noted.  Conjunctive are clear.       RADIOLOGY    none  LABORATORY STUDIES    none      Delia Estevez, RAFIA       "

## 2021-06-26 NOTE — PROGRESS NOTES
"Progress Notes by Moises Arvizu PA-C at 1/16/2018 12:20 PM     Author: Moises Arvizu PA-C Service: -- Author Type: Physician Assistant    Filed: 1/17/2018  3:23 PM Encounter Date: 1/16/2018 Status: Signed    : Moises Arvizu PA-C (Physician Assistant)       Subjective:      Patient ID: Venancio Tolentino is a 57 y.o. male.    Chief Complaint:    HPI  Venancio Tolentino is a 57 y.o. male who presents today complaining of cough that is dry nonproductive headache myalgias arthralgias fatigue anorexia he has had nasal congestion and chills.  No vomiting diarrhea skin rash abdominal pain or urinary symptoms.  Is not tried any treatment for this over-the-counter.    Past Medical History:   Diagnosis Date   ? Coronary artery disease        Past Surgical History:   Procedure Laterality Date   ? HERNIA REPAIR         Family History   Problem Relation Age of Onset   ? Snoring Father        Social History   Substance Use Topics   ? Smoking status: Former Smoker   ? Smokeless tobacco: Never Used   ? Alcohol use No       Review of Systems  As above in HPI, otherwise negative.    Objective:     /80  Pulse 73  Temp 97.7  F (36.5  C)  Ht 5' 5.5\" (1.664 m)  Wt 163 lb (73.9 kg)  SpO2 97%  BMI 26.71 kg/m2    Physical Exam  General: Patient is resting comfortably no acute distress is afebrile  HEENT: Head is normocephalic atraumatic eyes are PERRLA EOMI sclera anicteric   TMs are clear bilaterally  Throat is with mild pharyngeal wall erythema and mild exudate  No cervical lymphadenopathy present  Lungs: Clear to auscultation bilaterally  Heart: Regular rate and rhythm  Skin: Without rash non-diaphoretic    Lab:  Recent Results (from the past 72 hour(s))   Influenza A/B Rapid Test   Result Value Ref Range    Influenza  A, Rapid Antigen No Influenza A antigen detected No Influenza A antigen detected    Influenza B, Rapid Antigen Influenza B antigen detected (!) No Influenza B antigen detected     Assessment: "     Procedures  1. Influenza B  Influenza A/B Rapid Test    oseltamivir (TAMIFLU) 75 MG capsule    albuterol (PROAIR HFA;PROVENTIL HFA;VENTOLIN HFA) 90 mcg/actuation inhaler    benzonatate (TESSALON) 200 MG capsule   2. Cough  Influenza A/B Rapid Test     Plan:     1. Influenza B  Influenza A/B Rapid Test    oseltamivir (TAMIFLU) 75 MG capsule    albuterol (PROAIR HFA;PROVENTIL HFA;VENTOLIN HFA) 90 mcg/actuation inhaler    benzonatate (TESSALON) 200 MG capsule   2. Cough  Influenza A/B Rapid Test         Patient Instructions     Your rapid influenza test came back positive for flu. You are contagious until your fever is gone for 24 hours. Maintain good hand hygiene, cover your cough, and limit contact to prevent spreading the illness. Symptoms typically last 1-2 weeks.    Symptom management:  - Drink plenty of fluids and allow for plenty of rest  - Use tylenol or ibuprofen every 4-6 hours for fever/discomfort    Reasons to be seen immediately for re-evaluation:  - Have trouble breathing or are short of breath  - Feel pain or pressure in your chest or belly  - Get suddenly dizzy  - Feel confused  - Have severe vomiting    If no symptom improvement in 1 week, follow-up with your primary care provider.    As a result of our visit today, here are the action plans for you:    1. Medication(s) to stop: There are no discontinued medications.    2. Medication(s) to start or change:   Medications Ordered   Medications   ? albuterol (PROAIR HFA;PROVENTIL HFA;VENTOLIN HFA) 90 mcg/actuation inhaler     Sig: Inhale 2 puffs 3 (three) times a day as needed for wheezing.     Dispense:  1 Inhaler     Refill:  0   ? benzonatate (TESSALON) 200 MG capsule     Sig: Take 1 capsule (200 mg total) by mouth 3 (three) times a day as needed for cough.     Dispense:  30 capsule     Refill:  0   ? oseltamivir (TAMIFLU) 75 MG capsule     Sig: Take 1 capsule (75 mg total) by mouth 2 (two) times a day for 5 days.     Dispense:  10 capsule      Refill:  0       3. Other instructions: Yes         Influenza     Viruses that cause influenza spread through the air in droplets when someone who has the flu coughs, sneezes, laughs, or talks.   Influenza (the flu) is an infection that affects your respiratory tract. This tract is made up of your mouth, nose, and lungs, and the passages between them. Unlike a cold, the flu can make you very ill. And it can lead to pneumonia, a serious lung infection. The flu can have serious complications and even be fatal for some people. These include older adults, young children, and people with certain chronic conditions.  Who is at risk for the flu?  Anyone can get the flu. But you are more likely to become infected if you:    Have a weakened immune system    Work in a healthcare setting where you may be exposed to flu germs    Live or work with someone who has the flu    Havent had an annual flu shot  How does the flu spread?  The flu is caused by viruses. The viruses spread through the air in droplets when someone who has the flu coughs, sneezes, laughs, or talks. You can become infected when you inhale these viruses directly. You can also become infected when you touch a surface on which the droplets have landed and then transfer the germs to your eyes, nose, or mouth. Touching used tissues, or sharing utensils, drinking glasses, or a toothbrush with an infected person can expose you to flu viruses, too.  What are the symptoms of the flu?  Flu symptoms tend to come on quickly and may last a few days to a few weeks. They include:    Fever usually higher than 100.4 F  (38 C) and chills    Sore throat and headache    Dry cough    Runny nose    Tiredness and weakness    Muscle aches  Things that make the flu worse  For some people, the flu can be very serious. The risk for complications is greater for:    Children younger than age 5    Adults ages 65 and older    People with a chronic illness such as diabetes or heart, kidney,  or lung disease    People who live in a nursing home or long-term care facility   How is the flu treated?  The flu usually gets better after 7 days or so. In some cases, your healthcare provider may prescribe an antiviral medicine. This may help you get well sooner. For the medicine to help, you need to take it as soon as possible (ideally within 48 hours) after your symptoms start. If you develop pneumonia or other serious illness, you may need to stay in the hospital.  Easing flu symptoms    Drink lots of fluids such as water, juice, and warm soup. A good rule is to drink enough so that you urinate your normal amount.    Get plenty of rest.    Ask your healthcare provider what to take for fever and pain.    Call your provider if your fever is 100.4 F (38 C) or higher, or you become dizzy, lightheaded, or short of breath.  Taking steps to protect others    Wash your hands often, especially after coughing or sneezing. Or clean your hands with an alcohol-based hand  containing at least 60% alcohol.    Cough or sneeze into a tissue. Then throw the tissue away and wash your hands. If you dont have a tissue, cough and sneeze into the crook of your elbow.    Stay home until at least 24 hours after you no longer have a fever or chills. Be sure the fever isnt being hidden by fever-reducing medicine.    Dont share food, utensils, drinking glasses, or a toothbrush with others.    Ask your healthcare provider if others in your household should get antiviral medicine to help them avoid infection.  How can the flu be prevented?    One of the best ways to avoid the flu is to get a flu vaccine each year. Viruses that cause the flu change from year to year. For that reason, doctors recommend getting the flu vaccine each year, as soon as it's available in your area. The vaccine may be given as a shot or as a nasal spray. Your healthcare provider can tell you which vaccine is right for you. The nasal spray is not recommended  for the 3928-3305 flu season. The CDC says this is because the nasal spray did not seem to protect against the flu over the last several flu seasons. In the past, it was meant for people ages 2 to 49.    Wash your hands often. Frequent handwashing is a proven way to help prevent infection.    Carry an alcohol-based hand gel containing at least 60% alcohol. Use it when you can't use soap and water. Then wash your hands as soon as you can.    Avoid touching your eyes, nose, and mouth.    At home and work, clean phones, computer keyboards, and toys often with disinfectant wipes.    If possible, avoid close contact with others who have the flu or symptoms of the flu.  Handwashing tips  Handwashing is one of the best ways to prevent many common infections. If you are caring for or visiting someone with the flu, wash your hands each time you enter and leave the room. Follow these steps:    Use warm water and plenty of soap. Rub your hands together well.    Clean the whole hand, under your nails, between your fingers, and up the wrists.    Wash for at least 15 seconds.    Rinse, letting the water run down your fingers, not up your wrists.    Dry your hands well. Use a paper towel to turn off the faucet and open the door.  Using alcohol-based hand   Alcohol-based hand  are also a good choice. Use them when you can't use soap and water. Follow these steps:    Squeeze about a tablespoon of gel into the palm of one hand.    Rub your hands together briskly, cleaning the backs of your hands, the palms, between your fingers, and up the wrists.    Rub until the gel is gone and your hands are completely dry.  Preventing influenza in healthcare settings  The flu is a special concern for people in hospitals and long-term care facilities. To help prevent the spread of flu, many hospitals and nursing homes take these steps:    Healthcare providers wash their hands or use an alcohol-based hand  before and after  treating each patient.    People with the flu have private rooms and bathrooms or share a room with someone with the same infection.    People at high-risk for the flu but don't have it are encouraged to get the flu and pneumonia vaccines.    All healthcare workers are encouraged or required to get flu shots.   Date Last Reviewed: 8/27/2014 2000-2016 [employer]. [address] [place] [address], [place] [address]. All rights reserved. This information is not intended as a substitute for professional medical care. Always follow your healthcare professional's instructions.

## 2021-06-30 NOTE — PROGRESS NOTES
Progress Notes by Frank Wilburn MD at 11/19/2020  1:30 PM     Author: Frank Wilburn MD Service: -- Author Type: Physician    Filed: 11/19/2020  1:51 PM Encounter Date: 11/19/2020 Status: Signed    : Frank Wilburn MD (Physician)         Thank you, Dr. Lucero, for asking the [facility] team to see Mr. Venancio Tolentino to evaluate       Assessment/Recommendations   Assessment/Plan:  1. CAD s/p PCI to RCA/LAD - last angiography 2018, noted dyspnea on exertion but no chest pain, plan check BP at rest and with exertion and if not elevated would do stress imaging  2. Dyspnea on exertion - check BP as above, echo to screen for pulm HTN given ANISA and revisit with sleep center to discuss alternatives for therapy for ANISA  3. Hyperlipidemia - joint pain in hands - trial off statin, if no change would restart and consider higher dose 40mg, and consider therapy for joint pain like glucosamine chondroiten.  If pain resolves off statin, then consider coenzyme Q10 200-400mg daily and rechallenge crestor 20mg, may have to add zetia  4. Hypertriglcerides - no change on fish oil, will start gemfibrozil and change diet to avoid high carbohydrate  5. HTN - on lisinopril and metoprolol, await results from above, may have to raise lisinopril    Follow up in 1 year     History of Present Illness/Subjective    Mr. Venancio Tolentino is a 60 y.o. male with MI s/p PCI in 2014, last  on crestor 20mg, noted pain in his hands, he has ANISA but has not using CPAP, he reports dyspnea on exertion but no chest pain, he had PCI to RCA/LAD, last angiography 2018 with min disease.  No syncoapl events, PND/orthopnea. No syncopal events  No EtOH or tob.       Physical Examination Review of Systems   Vitals:    11/19/20 1338   BP: 158/85   Pulse: 65     There is no height or weight on file to calculate BMI.  Wt Readings from Last 3 Encounters:   10/05/20 155 lb 3 oz (70.4 kg)   09/04/19 161 lb 3 oz  (73.1 kg)   08/07/19 160 lb 7 oz (72.8 kg)     [unfilled]  General Appearance:   no distress, normal body habitus   ENT/Mouth: membranes moist, no oral lesions or bleeding gums.      EYES:  no scleral icterus, normal conjunctivae   Neck: no carotid bruits or thyromegaly   Chest/Lungs:   lungs are clear to auscultation, no rales or wheezing,  sternal scar, equal chest wall expansion    Cardiovascular:   Regular. Normal first and second heart sounds with no murmurs, rubs, or gallops; the carotid, radial and posterior tibial pulses are intact, Jugular venous pressure , edema bilaterally    Abdomen:  no organomegaly, masses, bruits, or tenderness; bowel sounds are present   Extremities: no cyanosis or clubbing   Skin: no xanthelasma, warm.    Neurologic: normal  bilateral, no tremors     Psychiatric: alert and oriented x3, calm     Review of Systems - 12 points nega other than above      Medical History  Surgical History Family History Social History   Past Medical History:   Diagnosis Date   ? 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 Cardiomyopathy (H)    ? Coronary artery disease    ? Hyperlipidemia    ? Hypertension    ? MI, old 2014   ? ANISA on CPAP     Past Surgical History:   Procedure Laterality Date   ? CARDIAC CATHETERIZATION  2014   ? CARDIAC CATHETERIZATION  2015   ? CARDIAC CATHETERIZATION  05/04/2018   ? CORONARY STENT PLACEMENT      rca and lad   ? CV CORONARY ANGIOGRAM N/A 5/4/2018    Procedure: Coronary Angiogram;  Surgeon: Frank Wilburn MD;  Location: [facility] Cath Lab;  Service:    ? CV LEFT HEART CATHETERIZATION WITH LEFT VENTRICULOGRAM N/A 5/4/2018    Procedure: Left Heart Catheterization with Left Ventriculogram;  Surgeon: Frank Wilburn MD;  Location: [facility] Cath Lab;  Service:    ? HERNIA REPAIR      Family History   Problem Relation Age of Onset   ? Snoring Father     Social History     Socioeconomic History   ? Marital status:      Spouse name: Not on file   ? Number of  children: Not on file   ? Years of education: Not on file   ? Highest education level: Not on file   Occupational History   ? Not on file   Social Needs   ? Financial resource strain: Not on file   ? Food insecurity     Worry: Not on file     Inability: Not on file   ? Transportation needs     Medical: Not on file     Non-medical: Not on file   Tobacco Use   ? Smoking status: Former Smoker   ? Smokeless tobacco: Never Used   Substance and Sexual Activity   ? Alcohol use: No   ? Drug use: Not on file   ? Sexual activity: Not on file   Lifestyle   ? Physical activity     Days per week: Not on file     Minutes per session: Not on file   ? Stress: Not on file   Relationships   ? Social connections     Talks on phone: Not on file     Gets together: Not on file     Attends [employer] service: Not on file     Active member of club or organization: Not on file     Attends meetings of clubs or organizations: Not on file     Relationship status: Not on file   ? Intimate partner violence     Fear of current or ex partner: Not on file     Emotionally abused: Not on file     Physically abused: Not on file     Forced sexual activity: Not on file   Other Topics Concern   ? Not on file   Social History Narrative   ? Not on file          Medications  Allergies   Scheduled Meds:  Continuous Infusions:  PRN Meds:. No Known Allergies      Lab Results    Chemistry/lipid CBC Cardiac Enzymes/BNP/TSH/INR   Lab Results   Component Value Date    CHOL 199 10/05/2020    HDL 38 (L) 10/05/2020    LDLCALC  10/05/2020      Comment:      Invalid, Triglycerides >400    TRIG 512 (H) 10/05/2020    CREATININE 1.05 10/05/2020    BUN 13 10/05/2020    K 4.0 10/05/2020     10/05/2020     10/05/2020    CO2 25 10/05/2020    Lab Results   Component Value Date    WBC 6.5 10/05/2020    HGB 15.8 10/05/2020    HCT 45.8 10/05/2020    MCV 96 10/05/2020     10/05/2020    Lab Results   Component Value Date    CKTOTAL 132 08/31/2015    CKMB 55 (HH)  03/25/2014    TROPONINI 80.40 () 03/24/2014    TSH 1.52 05/04/2018    INR 1.00 03/24/2014              Frank Wilburn MD  Interventional Cardiology  [facility]

## 2021-07-03 NOTE — ADDENDUM NOTE
Addendum Note by Paz Lucero MD at 9/4/2019  4:45 PM     Author: Paz Lucero MD Service: -- Author Type: Physician    Filed: 9/4/2019  5:43 PM Encounter Date: 9/4/2019 Status: Signed    : Paz Lucero MD (Physician)    Addended by: PAZ LUCERO on: 9/4/2019 05:43 PM        Modules accepted: Orders

## 2021-07-03 NOTE — ADDENDUM NOTE
Addendum Note by Paz Lucero MD at 10/5/2020  4:40 PM     Author: Paz Lucero MD Service: -- Author Type: Physician    Filed: 10/6/2020 11:30 AM Encounter Date: 10/5/2020 Status: Signed    : Paz Lucero MD (Physician)    Addended by: PAZ LUCERO on: 10/6/2020 11:30 AM        Modules accepted: Orders

## 2021-10-11 ENCOUNTER — HEALTH MAINTENANCE LETTER (OUTPATIENT)
Age: 61
End: 2021-10-11

## 2021-12-21 DIAGNOSIS — I10 ESSENTIAL HYPERTENSION: ICD-10-CM

## 2021-12-21 DIAGNOSIS — E78.5 HYPERLIPIDEMIA, UNSPECIFIED HYPERLIPIDEMIA TYPE: Primary | ICD-10-CM

## 2021-12-21 NOTE — TELEPHONE ENCOUNTER
Medication: Metoprolol  MG / Lisinopril 10 mg / Rosuvastatin 20 mg  Last Date Filled: 9/28/21  Last appointment addressing medication: 9/4/2021  Last B/P:  BP Readings from Last 3 Encounters:   11/19/20 (!) 158/85   10/05/20 (!) 148/84   09/04/19 134/74     Last labs pertaining to refill:10/05/2020      Pend medication and associate diagnosis before routing to Provider for review.       If patient has not been seen in over 1 year, pend 30 day supply and notify patient they are due for an appointment before any further refills.

## 2021-12-22 NOTE — TELEPHONE ENCOUNTER
Left message to call back for: Yifan  Information to relay to patient: Please relay message below.

## 2021-12-24 DIAGNOSIS — I25.10 CORONARY ARTERY DISEASE INVOLVING NATIVE CORONARY ARTERY OF NATIVE HEART WITHOUT ANGINA PECTORIS: ICD-10-CM

## 2021-12-24 DIAGNOSIS — I10 ESSENTIAL HYPERTENSION, BENIGN: Primary | ICD-10-CM

## 2021-12-24 NOTE — TELEPHONE ENCOUNTER
Reason for Call:  Medication or medication refill:    Do you use a [facility]?  Name of the pharmacy and phone number for the current request:  [facility] - [address], [place] [address] - [phone]    Name of the medication requested: Metoprolol - Rosuvastatin - Lysinopril    Other request: Running out within the week - would like a bridge to carry over until appt on 1/18/22    Can we leave a detailed message on this number? YES    Phone number patient can be reached at: Cell number on file:    Telephone Information:   Mobile [phone]       Best Time: Anytime    Call taken on 12/24/2021 at 11:40 AM by Sage Malone

## 2021-12-27 RX ORDER — GEMFIBROZIL 600 MG/1
600 TABLET, FILM COATED ORAL 2 TIMES DAILY
COMMUNITY
Start: 2020-11-19 | End: 2022-02-01

## 2021-12-27 RX ORDER — LISINOPRIL 10 MG/1
10 TABLET ORAL DAILY
Qty: 30 TABLET | Refills: 0 | Status: SHIPPED | OUTPATIENT
Start: 2021-12-27 | End: 2022-01-17

## 2021-12-27 RX ORDER — METOPROLOL SUCCINATE 100 MG/1
100 TABLET, EXTENDED RELEASE ORAL DAILY
Qty: 30 TABLET | Refills: 0 | Status: SHIPPED | OUTPATIENT
Start: 2021-12-27 | End: 2022-01-17

## 2021-12-27 RX ORDER — ROSUVASTATIN CALCIUM 20 MG/1
TABLET, COATED ORAL
COMMUNITY
Start: 2021-03-31 | End: 2021-12-27

## 2021-12-27 RX ORDER — LISINOPRIL 10 MG/1
TABLET ORAL
COMMUNITY
Start: 2021-03-31 | End: 2021-12-27

## 2021-12-27 RX ORDER — METOPROLOL SUCCINATE 100 MG/1
TABLET, EXTENDED RELEASE ORAL
COMMUNITY
Start: 2021-03-31 | End: 2021-12-27

## 2021-12-27 RX ORDER — ROSUVASTATIN CALCIUM 20 MG/1
20 TABLET, COATED ORAL AT BEDTIME
Qty: 30 TABLET | Refills: 0 | Status: SHIPPED | OUTPATIENT
Start: 2021-12-27 | End: 2022-01-17

## 2021-12-28 NOTE — TELEPHONE ENCOUNTER
Pt has visit scheduled for 1/18/21- he does only have 3 pills left. Is he able to get a particle fill.

## 2021-12-29 RX ORDER — ROSUVASTATIN CALCIUM 20 MG/1
20 TABLET, COATED ORAL DAILY
Qty: 30 TABLET | Refills: 0 | Status: SHIPPED | OUTPATIENT
Start: 2021-12-29 | End: 2022-01-18

## 2021-12-29 RX ORDER — LISINOPRIL 10 MG/1
10 TABLET ORAL DAILY
Qty: 30 TABLET | Refills: 0 | Status: SHIPPED | OUTPATIENT
Start: 2021-12-29 | End: 2022-01-18

## 2021-12-29 RX ORDER — METOPROLOL SUCCINATE 100 MG/1
100 TABLET, EXTENDED RELEASE ORAL DAILY
Qty: 30 TABLET | Refills: 0 | Status: SHIPPED | OUTPATIENT
Start: 2021-12-29 | End: 2022-01-18

## 2022-01-18 ENCOUNTER — OFFICE VISIT (OUTPATIENT)
Dept: FAMILY MEDICINE | Facility: CLINIC | Age: 62
End: 2022-01-18
Payer: COMMERCIAL

## 2022-01-18 VITALS
WEIGHT: 157 LBS | OXYGEN SATURATION: 98 % | BODY MASS INDEX: 26.13 KG/M2 | DIASTOLIC BLOOD PRESSURE: 86 MMHG | SYSTOLIC BLOOD PRESSURE: 138 MMHG | HEART RATE: 74 BPM

## 2022-01-18 DIAGNOSIS — I25.10 CORONARY ARTERY DISEASE INVOLVING NATIVE CORONARY ARTERY OF NATIVE HEART WITHOUT ANGINA PECTORIS: ICD-10-CM

## 2022-01-18 DIAGNOSIS — I10 ESSENTIAL HYPERTENSION, BENIGN: Primary | ICD-10-CM

## 2022-01-18 DIAGNOSIS — Z23 NEED FOR VACCINATION: ICD-10-CM

## 2022-01-18 DIAGNOSIS — E78.5 HYPERLIPIDEMIA, UNSPECIFIED HYPERLIPIDEMIA TYPE: ICD-10-CM

## 2022-01-18 DIAGNOSIS — Z76.89 ESTABLISHING CARE WITH NEW DOCTOR, ENCOUNTER FOR: ICD-10-CM

## 2022-01-18 DIAGNOSIS — Z11.4 SCREENING FOR HIV (HUMAN IMMUNODEFICIENCY VIRUS): ICD-10-CM

## 2022-01-18 DIAGNOSIS — Z11.59 NEED FOR HEPATITIS C SCREENING TEST: ICD-10-CM

## 2022-01-18 DIAGNOSIS — Z71.89 ACP (ADVANCE CARE PLANNING): ICD-10-CM

## 2022-01-18 PROCEDURE — 90750 HZV VACC RECOMBINANT IM: CPT | Performed by: NURSE PRACTITIONER

## 2022-01-18 PROCEDURE — 36415 COLL VENOUS BLD VENIPUNCTURE: CPT | Performed by: NURSE PRACTITIONER

## 2022-01-18 PROCEDURE — 80053 COMPREHEN METABOLIC PANEL: CPT | Performed by: NURSE PRACTITIONER

## 2022-01-18 PROCEDURE — 80061 LIPID PANEL: CPT | Performed by: NURSE PRACTITIONER

## 2022-01-18 PROCEDURE — 87389 HIV-1 AG W/HIV-1&-2 AB AG IA: CPT | Performed by: NURSE PRACTITIONER

## 2022-01-18 PROCEDURE — 99214 OFFICE O/P EST MOD 30 MIN: CPT | Mod: 25 | Performed by: NURSE PRACTITIONER

## 2022-01-18 PROCEDURE — 86803 HEPATITIS C AB TEST: CPT | Performed by: NURSE PRACTITIONER

## 2022-01-18 PROCEDURE — 90471 IMMUNIZATION ADMIN: CPT | Performed by: NURSE PRACTITIONER

## 2022-01-18 RX ORDER — ASPIRIN 81 MG/1
81 TABLET, CHEWABLE ORAL DAILY
COMMUNITY
End: 2022-02-01

## 2022-01-18 RX ORDER — METOPROLOL SUCCINATE 100 MG/1
100 TABLET, EXTENDED RELEASE ORAL DAILY
Qty: 90 TABLET | Refills: 0 | Status: SHIPPED | OUTPATIENT
Start: 2022-01-18 | End: 2022-04-25

## 2022-01-18 RX ORDER — LISINOPRIL 10 MG/1
10 TABLET ORAL DAILY
Qty: 90 TABLET | Refills: 0 | Status: ON HOLD | OUTPATIENT
Start: 2022-01-18 | End: 2022-02-25

## 2022-01-18 RX ORDER — ROSUVASTATIN CALCIUM 20 MG/1
20 TABLET, COATED ORAL DAILY
Qty: 90 TABLET | Refills: 0 | Status: SHIPPED | OUTPATIENT
Start: 2022-01-18 | End: 2022-04-25

## 2022-01-18 NOTE — PROGRESS NOTES
Assessment & Plan     Essential hypertension, benign    - Comprehensive metabolic panel (BMP + Alb, Alk Phos, ALT, AST, Total. Bili, TP)  - Adult Cardiology Eval Referral  - metoprolol succinate ER (TOPROL-XL) 100 MG 24 hr tablet  Dispense: 90 tablet; Refill: 0  - lisinopril (ZESTRIL) 10 MG tablet  Dispense: 90 tablet; Refill: 0 blood pressure is borderline today, no medication adjustments were  Initiated.  He is overdue to see his cardiologist, referral order was placed today to assist with scheduling    Coronary artery disease involving native coronary artery of native heart without angina pectoris    - Lipid panel reflex to direct LDL Non-fasting  - Adult Cardiology Eval Referral  Status post drug-eluting stent to the RCA and LAD in 2018, he is overdue to see his cardiologist Dr. aTlley so a referral order was placed today to get his appointment scheduled  I did send him 90-day refills in the meantime and urged him to be seen by his cardiology specialist sooner than later due to his intermittent bouts of mild shortness of breath.  We discussed the importance of yearly follow-up with his cardiology team due to his multiple coronary catheterizations dating back from 5082-8967    Hyperlipidemia, unspecified hyperlipidemia type    - Lipid panel reflex to direct LDL Non-fasting  - Adult Cardiology Eval Referral  - rosuvastatin (CRESTOR) 20 MG tablet  Dispense: 90 tablet; Refill: 0  He is nonfasting today, lab work is pending  He will continue with the dual therapy of the gemfibrozil twice daily along with the Crestor 20 mg daily    Screening for HIV (human immunodeficiency virus)    - HIV Antigen Antibody Combo    Need for hepatitis C screening test    - Hepatitis C Screen Reflex to HCV RNA Quant and Genotype    Need for vaccination    - ZOSTER VACCINE RECOMBINANT ADJUVANTED IM NJX    ACP (advance care planning)    - REVIEW OF HEALTH MAINTENANCE PROTOCOL ORDERS  Honoring choices packet given to patient for  completion, he will return to the clinic once is notarized    Establishing care with new doctor, encounter for    Transfer of care from Dr. Lucero                   Return in about 6 months (around 7/18/2022) for Follow up, with me, in person, med check.    Marlen Daneils NP  [facility] is a 61 year old who presents for the following health issues: establish care/med check    HPI     Hyperlipidemia Follow-Up      Are you regularly taking any medication or supplement to lower your cholesterol?   Yes- taking Crestor 20 mg daily and Gemfibrizol 600 mg BID    Are you having muscle aches or other side effects that you think could be caused by your cholesterol lowering medication?  No     History of CAD, has had several cardiac caths with stent placement to the LAD and RCA (KEVIN)     Denies radiation, diaphoresis, dizziness, syncope, nausea, palpitations, and associated with activity.     He does report mild SOB intermittently with activity, no CP reported during the episodes, the SOB does resolve after about one minute of rest.     Former smoker: quit in 2012    No ETOH    Overdue to see his cardiologist, Dr. Wilburn. He was supposed to follow up in 11/2021, but he missed his appointment.     Hypertension Follow-up       Do you check your blood pressure regularly outside of the clinic? Yes  Taking Lisinopril 10 mg daily along with Metoprolol 100 mg XL daily    Are you following a low salt diet? No    Are your blood pressures ever more than 140 on the top number (systolic) OR more   than 90 on the bottom number (diastolic), for example 140/90? No    Vascular Disease Follow-up      How often do you take nitroglycerin? Never    Do you take an aspirin every day? Yes      How many servings of fruits and vegetables do you eat daily?  0-1    On average, how many sweetened beverages do you drink each day (Examples: soda, juice, sweet tea, etc.  Do NOT count diet or artificially  sweetened beverages)?   1    How many days per week do you exercise enough to make your heart beat faster? 3 or less    How many minutes a day do you exercise enough to make your heart beat faster? 9 or less  How many days per week do you miss taking your medication? Occasionally forgets to take second dose of Gemfibrozil, averages one to two times per week    What makes it hard for you to take your medications?  remembering to take        Review of Systems   CONSTITUTIONAL: NEGATIVE for fever, chills, change in weight  ENT/MOUTH: NEGATIVE for ear, mouth and throat problems  RESP: NEGATIVE for significant cough   CV: NEGATIVE for chest pain, palpitations or peripheral edema      Objective    There were no vitals taken for this visit.  There is no height or weight on file to calculate BMI.  Physical Exam   GENERAL: healthy, alert and no distress  NECK: no adenopathy, no asymmetry, masses, or scars and thyroid normal to palpation  RESP: lungs clear to auscultation - no rales, rhonchi or wheezes  CV: regular rate and rhythm, normal S1 S2, no S3 or S4, no murmur, click or rub, no peripheral edema and peripheral pulses strong  SKIN: no suspicious lesions or rashes, pink, warm and dry  NEURO: Normal strength and tone, mentation intact and speech normal

## 2022-01-19 LAB
ALBUMIN SERPL-MCNC: 4.1 G/DL (ref 3.5–5)
ALP SERPL-CCNC: 73 U/L (ref 45–120)
ALT SERPL W P-5'-P-CCNC: 16 U/L (ref 0–45)
ANION GAP SERPL CALCULATED.3IONS-SCNC: 14 MMOL/L (ref 5–18)
AST SERPL W P-5'-P-CCNC: 23 U/L (ref 0–40)
BILIRUB SERPL-MCNC: 1.3 MG/DL (ref 0–1)
BUN SERPL-MCNC: 15 MG/DL (ref 8–22)
CALCIUM SERPL-MCNC: 9.2 MG/DL (ref 8.5–10.5)
CHLORIDE BLD-SCNC: 104 MMOL/L (ref 98–107)
CHOLEST SERPL-MCNC: 113 MG/DL
CO2 SERPL-SCNC: 23 MMOL/L (ref 22–31)
CREAT SERPL-MCNC: 0.83 MG/DL (ref 0.7–1.3)
FASTING STATUS PATIENT QL REPORTED: NO
GFR SERPL CREATININE-BSD FRML MDRD: >90 ML/MIN/1.73M2
GLUCOSE BLD-MCNC: 83 MG/DL (ref 70–125)
HDLC SERPL-MCNC: 43 MG/DL
HIV 1+2 AB+HIV1 P24 AG SERPL QL IA: NEGATIVE
LDLC SERPL CALC-MCNC: 47 MG/DL
POTASSIUM BLD-SCNC: 4.4 MMOL/L (ref 3.5–5)
PROT SERPL-MCNC: 7.4 G/DL (ref 6–8)
SODIUM SERPL-SCNC: 141 MMOL/L (ref 136–145)
TRIGL SERPL-MCNC: 117 MG/DL

## 2022-01-21 LAB — HCV AB SERPL QL IA: NONREACTIVE

## 2022-02-01 ENCOUNTER — OFFICE VISIT (OUTPATIENT)
Dept: CARDIOLOGY | Facility: CLINIC | Age: 62
End: 2022-02-01
Attending: NURSE PRACTITIONER
Payer: COMMERCIAL

## 2022-02-01 VITALS
RESPIRATION RATE: 16 BRPM | SYSTOLIC BLOOD PRESSURE: 140 MMHG | DIASTOLIC BLOOD PRESSURE: 80 MMHG | HEART RATE: 64 BPM | WEIGHT: 154 LBS | BODY MASS INDEX: 25.63 KG/M2

## 2022-02-01 DIAGNOSIS — G47.30 SLEEP APNEA, UNSPECIFIED TYPE: ICD-10-CM

## 2022-02-01 DIAGNOSIS — I25.10 CORONARY ARTERY DISEASE INVOLVING NATIVE CORONARY ARTERY OF NATIVE HEART WITHOUT ANGINA PECTORIS: ICD-10-CM

## 2022-02-01 DIAGNOSIS — R06.09 DYSPNEA ON EXERTION: Primary | ICD-10-CM

## 2022-02-01 DIAGNOSIS — E78.1 HYPERTRIGLYCERIDEMIA: ICD-10-CM

## 2022-02-01 DIAGNOSIS — E78.5 HYPERLIPIDEMIA, UNSPECIFIED HYPERLIPIDEMIA TYPE: ICD-10-CM

## 2022-02-01 DIAGNOSIS — I10 ESSENTIAL HYPERTENSION, BENIGN: ICD-10-CM

## 2022-02-01 DIAGNOSIS — R07.9 CHEST PAIN ON EXERTION: ICD-10-CM

## 2022-02-01 PROCEDURE — 99214 OFFICE O/P EST MOD 30 MIN: CPT | Performed by: INTERNAL MEDICINE

## 2022-02-01 RX ORDER — UBIDECARENONE 30 MG
1 CAPSULE ORAL DAILY
COMMUNITY

## 2022-02-01 RX ORDER — GEMFIBROZIL 600 MG/1
600 TABLET, FILM COATED ORAL 2 TIMES DAILY
Qty: 180 TABLET | Refills: 11 | Status: SHIPPED | OUTPATIENT
Start: 2022-02-01 | End: 2023-02-20

## 2022-02-01 RX ORDER — CHLORAL HYDRATE 500 MG
2 CAPSULE ORAL DAILY
COMMUNITY

## 2022-02-01 RX ORDER — ASPIRIN 81 MG/1
162 TABLET, CHEWABLE ORAL DAILY
Start: 2022-02-01

## 2022-02-01 NOTE — H&P (VIEW-ONLY)
Thank you, Dr. Daniels, for asking the [facility] team to see Mr. Venancio Tolentino to evaluate       Assessment/Recommendations   Assessment/Plan:  1. Dyspnea on exertion - some progression, minimal chest pressure, could be related to progression of disease or neoatherosclerosis in the stents or due to HTN/microvascular dz - arrange for stress echo and if ischemia would proceed with angiogrpahy and possible PCI  2. Lipids - trig vastly improved and LDL at target cont lopid/crestor  3. HTN - slightly elevated here - await stress echo cont metoprolol and lisinopril  4. ANISA - refer back to sleep study physician for input on therapy    Follow up 1 year or sooner pending results of stress echo     History of Present Illness/Subjective    Mr. Venancio Tolentino is a 61 year old male with CAD s/p PCI to RCA/LAD, last angiography in 2018, hypertriglyceridemia on lopid/crestor, no further hand cramps, lipids 1/2022 with trig down to 117; HDL 43; LDL 47.  No chest pain/pressure, still has dyspnea on exertion resolves with rest, no chest pain/pressure but little tightness; Echo 1/2021 with EF 56% normal PA pressure.  /80 at rest pulse 64.   No syncopal events,  No PND/orthopnea, hx of ANISA but does not regularly use it.   Wrist BP cuff at home systolic 114 at rest and with exertion upto 140.  No GI/ bleeding.  No palpitations.       Physical Examination Review of Systems   BP (!) 140/80 (BP Location: Right arm, Patient Position: Sitting, Cuff Size: Adult Regular)   Pulse 64   Resp 16   Wt 69.9 kg (154 lb)   BMI 25.63 kg/m    Body mass index is 25.63 kg/m .  Wt Readings from Last 3 Encounters:   02/01/22 69.9 kg (154 lb)   01/18/22 71.2 kg (157 lb)   10/05/20 70.4 kg (155 lb 3 oz)     [unfilled]  General Appearance:   no distress, normal body habitus   ENT/Mouth: membranes moist, no oral lesions or bleeding gums.      EYES:  no scleral icterus, normal conjunctivae   Neck: no carotid bruits or  thyromegaly   Chest/Lungs:   lungs are clear to auscultation, no rales or wheezing,  sternal scar, equal chest wall expansion    Cardiovascular:   Regular. Normal first and second heart sounds with no murmurs, rubs, or gallops; the carotid, radial and posterior tibial pulses are intact, Jugular venous pressure , edema bilaterally    Abdomen:  no organomegaly, masses, bruits, or tenderness; bowel sounds are present   Extremities: no cyanosis or clubbing   Skin: no xanthelasma, warm.    Neurologic: normal  bilateral, no tremors     Psychiatric: alert and oriented x3, calm     Review of Systems - 12 points nega other than above      Medical History  Surgical History Family History Social History   No past medical history on file. Past Surgical History:   Procedure Laterality Date     CARDIAC CATHETERIZATION  2014     CARDIAC CATHETERIZATION  2015     CARDIAC CATHETERIZATION  05/04/2018     CORONARY STENT PLACEMENT      rca and lad     CV CORONARY ANGIOGRAM N/A 5/4/2018    Procedure: Coronary Angiogram;  Surgeon: Frank Wilburn MD;  Location: [facility] Cath Lab;  Service:      CV LEFT HEART CATHETERIZATION WITH LEFT VENTRICULOGRAM N/A 5/4/2018    Procedure: Left Heart Catheterization with Left Ventriculogram;  Surgeon: Frank Wilburn MD;  Location: [facility] Cath Lab;  Service:      HERNIA REPAIR       SURGICAL HISTORY OF -   1993    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     SURGICAL HISTORY OF -   7/7/2000    Right inguinal hernia with high ligation of indirect hernia sac and placement of Prolene mesh    Family History   Problem Relation Age of Onset     Hypertension Mother      Cerebrovascular Disease Mother      Cardiovascular Mother         Marfan's Syndrome     Respiratory Father         emphesema     Snoring Father      Asthma Sister      Cardiovascular Sister         aortic aneurism Marfan's syndrome.    Social History     Socioeconomic History     Marital status:       Spouse name: Linda     Number of children: 3     Years of education: Not on file     Highest education level: Not on file   Occupational History     Not on file   Tobacco Use     Smoking status: Former Smoker     Packs/day: 1.00     Years: 25.00     Pack years: 25.00     Types: Cigarettes     Quit date: 2012     Years since quitting: 10.0     Smokeless tobacco: Never Used   Substance and Sexual Activity     Alcohol use: No     Drug use: Never     Sexual activity: Not Currently     Partners: Female   Other Topics Concern     Not on file   Social History Narrative     Not on file     Social Determinants of Health     Financial Resource Strain: Not on file   Food Insecurity: Not on file   Transportation Needs: Not on file   Physical Activity: Not on file   Stress: Not on file   Social Connections: Not on file   Intimate Partner Violence: Not on file   Housing Stability: Not on file          Medications  Allergies   Scheduled Meds:  Continuous Infusions:  PRN Meds:. No Known Allergies      Lab Results    Chemistry/lipid CBC Cardiac Enzymes/BNP/TSH/INR   Lab Results   Component Value Date    CHOL 113 01/18/2022    HDL 43 01/18/2022    TRIG 117 01/18/2022    BUN 15 01/18/2022     01/18/2022    CO2 23 01/18/2022    Lab Results   Component Value Date    WBC 6.5 10/05/2020    HGB 15.8 10/05/2020    HCT 45.8 10/05/2020    MCV 96 10/05/2020     10/05/2020    Lab Results   Component Value Date    TSH 1.52 05/04/2018              Frank Wilburn MD  Interventional Cardiology  [facility]

## 2022-02-01 NOTE — LETTER
2/1/2022    Marlen Daniels, NP  [address]  [facility] [address]    RE: Venancio YOUNG Rajan       Dear Colleague,     I had the pleasure of seeing Venancio Tolentino in the [facility].    Thank you, Dr. Daniels, for asking the [facility] team to see Mr. Venancio Tolentino to evaluate       Assessment/Recommendations   Assessment/Plan:  1. Dyspnea on exertion - some progression, minimal chest pressure, could be related to progression of disease or neoatherosclerosis in the stents or due to HTN/microvascular dz - arrange for stress echo and if ischemia would proceed with angiogrpahy and possible PCI  2. Lipids - trig vastly improved and LDL at target cont lopid/crestor  3. HTN - slightly elevated here - await stress echo cont metoprolol and lisinopril  4. ANISA - refer back to sleep study physician for input on therapy    Follow up 1 year or sooner pending results of stress echo     History of Present Illness/Subjective    Mr. Venancio Tolentino is a 61 year old male with CAD s/p PCI to RCA/LAD, last angiography in 2018, hypertriglyceridemia on lopid/crestor, no further hand cramps, lipids 1/2022 with trig down to 117; HDL 43; LDL 47.  No chest pain/pressure, still has dyspnea on exertion resolves with rest, no chest pain/pressure but little tightness; Echo 1/2021 with EF 56% normal PA pressure.  /80 at rest pulse 64.   No syncopal events,  No PND/orthopnea, hx of ANISA but does not regularly use it.   Wrist BP cuff at home systolic 114 at rest and with exertion upto 140.  No GI/ bleeding.  No palpitations.       Physical Examination Review of Systems   BP (!) 140/80 (BP Location: Right arm, Patient Position: Sitting, Cuff Size: Adult Regular)   Pulse 64   Resp 16   Wt 69.9 kg (154 lb)   BMI 25.63 kg/m    Body mass index is 25.63 kg/m .  Wt Readings from Last 3 Encounters:   02/01/22 69.9 kg (154 lb)   01/18/22 71.2 kg (157 lb)   10/05/20 70.4 kg (155 lb 3 oz)      [unfilled]  General Appearance:   no distress, normal body habitus   ENT/Mouth: membranes moist, no oral lesions or bleeding gums.      EYES:  no scleral icterus, normal conjunctivae   Neck: no carotid bruits or thyromegaly   Chest/Lungs:   lungs are clear to auscultation, no rales or wheezing,  sternal scar, equal chest wall expansion    Cardiovascular:   Regular. Normal first and second heart sounds with no murmurs, rubs, or gallops; the carotid, radial and posterior tibial pulses are intact, Jugular venous pressure , edema bilaterally    Abdomen:  no organomegaly, masses, bruits, or tenderness; bowel sounds are present   Extremities: no cyanosis or clubbing   Skin: no xanthelasma, warm.    Neurologic: normal  bilateral, no tremors     Psychiatric: alert and oriented x3, calm     Review of Systems - 12 points nega other than above      Medical History  Surgical History Family History Social History   No past medical history on file. Past Surgical History:   Procedure Laterality Date     CARDIAC CATHETERIZATION  2014     CARDIAC CATHETERIZATION  2015     CARDIAC CATHETERIZATION  05/04/2018     CORONARY STENT PLACEMENT      rca and lad     CV CORONARY ANGIOGRAM N/A 5/4/2018    Procedure: Coronary Angiogram;  Surgeon: Frank Wilburn MD;  Location: [facility] Cath Lab;  Service:      CV LEFT HEART CATHETERIZATION WITH LEFT VENTRICULOGRAM N/A 5/4/2018    Procedure: Left Heart Catheterization with Left Ventriculogram;  Surgeon: Frank Wilburn MD;  Location: [facility] Cath Lab;  Service:      HERNIA REPAIR       SURGICAL HISTORY OF -   1993    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     SURGICAL HISTORY OF -   7/7/2000    Right inguinal hernia with high ligation of indirect hernia sac and placement of Prolene mesh    Family History   Problem Relation Age of Onset     Hypertension Mother      Cerebrovascular Disease Mother      Cardiovascular Mother         Marfan's Syndrome      Respiratory Father         emphesema     Snoring Father      Asthma Sister      Cardiovascular Sister         aortic aneurism Marfan's syndrome.    Social History     Socioeconomic History     Marital status:      Spouse name: Linda     Number of children: 3     Years of education: Not on file     Highest education level: Not on file   Occupational History     Not on file   Tobacco Use     Smoking status: Former Smoker     Packs/day: 1.00     Years: 25.00     Pack years: 25.00     Types: Cigarettes     Quit date: 2012     Years since quitting: 10.0     Smokeless tobacco: Never Used   Substance and Sexual Activity     Alcohol use: No     Drug use: Never     Sexual activity: Not Currently     Partners: Female   Other Topics Concern     Not on file   Social History Narrative     Not on file     Social Determinants of Health     Financial Resource Strain: Not on file   Food Insecurity: Not on file   Transportation Needs: Not on file   Physical Activity: Not on file   Stress: Not on file   Social Connections: Not on file   Intimate Partner Violence: Not on file   Housing Stability: Not on file          Medications  Allergies   Scheduled Meds:  Continuous Infusions:  PRN Meds:. No Known Allergies      Lab Results    Chemistry/lipid CBC Cardiac Enzymes/BNP/TSH/INR   Lab Results   Component Value Date    CHOL 113 01/18/2022    HDL 43 01/18/2022    TRIG 117 01/18/2022    BUN 15 01/18/2022     01/18/2022    CO2 23 01/18/2022    Lab Results   Component Value Date    WBC 6.5 10/05/2020    HGB 15.8 10/05/2020    HCT 45.8 10/05/2020    MCV 96 10/05/2020     10/05/2020    Lab Results   Component Value Date    TSH 1.52 05/04/2018              Frank Wilburn MD  Interventional Cardiology  [facility]    Thank you for allowing me to participate in the care of your patient.      Sincerely,     Frank Wilburn MD     [facility]  [facility]  cc:   Marlen Daniels, NP  [address]  [place] [address]

## 2022-02-01 NOTE — PROGRESS NOTES
Thank you, Dr. Daniels, for asking the [facility] team to see Mr. Venancio Tolentino to evaluate       Assessment/Recommendations   Assessment/Plan:  1. Dyspnea on exertion - some progression, minimal chest pressure, could be related to progression of disease or neoatherosclerosis in the stents or due to HTN/microvascular dz - arrange for stress echo and if ischemia would proceed with angiogrpahy and possible PCI  2. Lipids - trig vastly improved and LDL at target cont lopid/crestor  3. HTN - slightly elevated here - await stress echo cont metoprolol and lisinopril  4. ANISA - refer back to sleep study physician for input on therapy    Follow up 1 year or sooner pending results of stress echo     History of Present Illness/Subjective    Mr. Venancio Tolentino is a 61 year old male with CAD s/p PCI to RCA/LAD, last angiography in 2018, hypertriglyceridemia on lopid/crestor, no further hand cramps, lipids 1/2022 with trig down to 117; HDL 43; LDL 47.  No chest pain/pressure, still has dyspnea on exertion resolves with rest, no chest pain/pressure but little tightness; Echo 1/2021 with EF 56% normal PA pressure.  /80 at rest pulse 64.   No syncopal events,  No PND/orthopnea, hx of ANISA but does not regularly use it.   Wrist BP cuff at home systolic 114 at rest and with exertion upto 140.  No GI/ bleeding.  No palpitations.       Physical Examination Review of Systems   BP (!) 140/80 (BP Location: Right arm, Patient Position: Sitting, Cuff Size: Adult Regular)   Pulse 64   Resp 16   Wt 69.9 kg (154 lb)   BMI 25.63 kg/m    Body mass index is 25.63 kg/m .  Wt Readings from Last 3 Encounters:   02/01/22 69.9 kg (154 lb)   01/18/22 71.2 kg (157 lb)   10/05/20 70.4 kg (155 lb 3 oz)     [unfilled]  General Appearance:   no distress, normal body habitus   ENT/Mouth: membranes moist, no oral lesions or bleeding gums.      EYES:  no scleral icterus, normal conjunctivae   Neck: no carotid bruits or  thyromegaly   Chest/Lungs:   lungs are clear to auscultation, no rales or wheezing,  sternal scar, equal chest wall expansion    Cardiovascular:   Regular. Normal first and second heart sounds with no murmurs, rubs, or gallops; the carotid, radial and posterior tibial pulses are intact, Jugular venous pressure , edema bilaterally    Abdomen:  no organomegaly, masses, bruits, or tenderness; bowel sounds are present   Extremities: no cyanosis or clubbing   Skin: no xanthelasma, warm.    Neurologic: normal  bilateral, no tremors     Psychiatric: alert and oriented x3, calm     Review of Systems - 12 points nega other than above      Medical History  Surgical History Family History Social History   No past medical history on file. Past Surgical History:   Procedure Laterality Date     CARDIAC CATHETERIZATION  2014     CARDIAC CATHETERIZATION  2015     CARDIAC CATHETERIZATION  05/04/2018     CORONARY STENT PLACEMENT      rca and lad     CV CORONARY ANGIOGRAM N/A 5/4/2018    Procedure: Coronary Angiogram;  Surgeon: Frank Wilburn MD;  Location: [facility] Cath Lab;  Service:      CV LEFT HEART CATHETERIZATION WITH LEFT VENTRICULOGRAM N/A 5/4/2018    Procedure: Left Heart Catheterization with Left Ventriculogram;  Surgeon: Frank Wilburn MD;  Location: [facility] Cath Lab;  Service:      HERNIA REPAIR       SURGICAL HISTORY OF -   1993    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     SURGICAL HISTORY OF -   7/7/2000    Right inguinal hernia with high ligation of indirect hernia sac and placement of Prolene mesh    Family History   Problem Relation Age of Onset     Hypertension Mother      Cerebrovascular Disease Mother      Cardiovascular Mother         Marfan's Syndrome     Respiratory Father         emphesema     Snoring Father      Asthma Sister      Cardiovascular Sister         aortic aneurism Marfan's syndrome.    Social History     Socioeconomic History     Marital status:       Spouse name: Linda     Number of children: 3     Years of education: Not on file     Highest education level: Not on file   Occupational History     Not on file   Tobacco Use     Smoking status: Former Smoker     Packs/day: 1.00     Years: 25.00     Pack years: 25.00     Types: Cigarettes     Quit date: 2012     Years since quitting: 10.0     Smokeless tobacco: Never Used   Substance and Sexual Activity     Alcohol use: No     Drug use: Never     Sexual activity: Not Currently     Partners: Female   Other Topics Concern     Not on file   Social History Narrative     Not on file     Social Determinants of Health     Financial Resource Strain: Not on file   Food Insecurity: Not on file   Transportation Needs: Not on file   Physical Activity: Not on file   Stress: Not on file   Social Connections: Not on file   Intimate Partner Violence: Not on file   Housing Stability: Not on file          Medications  Allergies   Scheduled Meds:  Continuous Infusions:  PRN Meds:. No Known Allergies      Lab Results    Chemistry/lipid CBC Cardiac Enzymes/BNP/TSH/INR   Lab Results   Component Value Date    CHOL 113 01/18/2022    HDL 43 01/18/2022    TRIG 117 01/18/2022    BUN 15 01/18/2022     01/18/2022    CO2 23 01/18/2022    Lab Results   Component Value Date    WBC 6.5 10/05/2020    HGB 15.8 10/05/2020    HCT 45.8 10/05/2020    MCV 96 10/05/2020     10/05/2020    Lab Results   Component Value Date    TSH 1.52 05/04/2018              Frank Wilburn MD  Interventional Cardiology  [facility]

## 2022-02-14 ENCOUNTER — HOSPITAL ENCOUNTER (OUTPATIENT)
Dept: CARDIOLOGY | Facility: HOSPITAL | Age: 62
Discharge: HOME | End: 2022-02-14
Attending: INTERNAL MEDICINE | Admitting: INTERNAL MEDICINE
Payer: COMMERCIAL

## 2022-02-14 DIAGNOSIS — R06.09 DYSPNEA ON EXERTION: ICD-10-CM

## 2022-02-14 DIAGNOSIS — R07.9 CHEST PAIN ON EXERTION: ICD-10-CM

## 2022-02-14 PROCEDURE — 93350 STRESS TTE ONLY: CPT | Mod: 26 | Performed by: INTERNAL MEDICINE

## 2022-02-14 PROCEDURE — 93321 DOPPLER ECHO F-UP/LMTD STD: CPT | Mod: TC

## 2022-02-14 PROCEDURE — 93016 CV STRESS TEST SUPVJ ONLY: CPT | Performed by: INTERNAL MEDICINE

## 2022-02-14 PROCEDURE — 93321 DOPPLER ECHO F-UP/LMTD STD: CPT | Mod: 26 | Performed by: INTERNAL MEDICINE

## 2022-02-14 PROCEDURE — 93325 DOPPLER ECHO COLOR FLOW MAPG: CPT | Mod: 26 | Performed by: INTERNAL MEDICINE

## 2022-02-14 PROCEDURE — 93352 ADMIN ECG CONTRAST AGENT: CPT | Performed by: INTERNAL MEDICINE

## 2022-02-14 PROCEDURE — 255N000002 HC RX 255 OP 636: Performed by: INTERNAL MEDICINE

## 2022-02-14 PROCEDURE — 93018 CV STRESS TEST I&R ONLY: CPT | Performed by: INTERNAL MEDICINE

## 2022-02-14 PROCEDURE — 999N000208 ECHO STRESS ECHOCARDIOGRAM

## 2022-02-14 RX ADMIN — PERFLUTREN 7 ML: 6.52 INJECTION, SUSPENSION INTRAVENOUS at 09:12

## 2022-02-16 ENCOUNTER — TELEPHONE (OUTPATIENT)
Dept: CARDIOLOGY | Facility: CLINIC | Age: 62
End: 2022-02-16
Payer: COMMERCIAL

## 2022-02-16 DIAGNOSIS — Z11.59 ENCOUNTER FOR SCREENING FOR OTHER VIRAL DISEASES: Primary | ICD-10-CM

## 2022-02-16 DIAGNOSIS — R94.39 ABNORMAL CARDIOVASCULAR STRESS TEST: Primary | ICD-10-CM

## 2022-02-16 NOTE — TELEPHONE ENCOUNTER
----- Message from Frank Wilburn MD sent at 2/14/2022 12:32 PM CST -----  Please refer for angiography and possible PCi wt Dr Cramer or Dr. George  Thanks  Frank

## 2022-02-22 ENCOUNTER — LAB (OUTPATIENT)
Dept: LAB | Facility: CLINIC | Age: 62
End: 2022-02-22
Payer: COMMERCIAL

## 2022-02-22 DIAGNOSIS — Z11.59 ENCOUNTER FOR SCREENING FOR OTHER VIRAL DISEASES: ICD-10-CM

## 2022-02-22 LAB — SARS-COV-2 RNA RESP QL NAA+PROBE: NEGATIVE

## 2022-02-22 PROCEDURE — U0003 INFECTIOUS AGENT DETECTION BY NUCLEIC ACID (DNA OR RNA); SEVERE ACUTE RESPIRATORY SYNDROME CORONAVIRUS 2 (SARS-COV-2) (CORONAVIRUS DISEASE [COVID-19]), AMPLIFIED PROBE TECHNIQUE, MAKING USE OF HIGH THROUGHPUT TECHNOLOGIES AS DESCRIBED BY CMS-2020-01-R: HCPCS

## 2022-02-22 PROCEDURE — U0005 INFEC AGEN DETEC AMPLI PROBE: HCPCS

## 2022-02-23 NOTE — TELEPHONE ENCOUNTER
----- Message -----  From: Kevin Ardon  Sent: 2/16/2022  12:54 PM CST  To: Shasta Gtz RN  Subject: [facility] ORDERING                                     CORS POSS PCI WITH EMG ONLY PER [facility]    730AM ADMIT ON 2/25    H&P ON 2/1 WITH [facility]     NO ALERTS     COVID TESTING ON 2/22 IN [facility]     THANKS!   -KEVIN

## 2022-02-24 ENCOUNTER — PREP FOR PROCEDURE (OUTPATIENT)
Dept: CARDIOLOGY | Facility: CLINIC | Age: 62
End: 2022-02-24
Payer: COMMERCIAL

## 2022-02-24 DIAGNOSIS — R94.39 ABNORMAL CARDIOVASCULAR STRESS TEST: Primary | ICD-10-CM

## 2022-02-24 RX ORDER — ASPIRIN 81 MG/1
243 TABLET, CHEWABLE ORAL ONCE
Status: CANCELLED | OUTPATIENT
Start: 2022-02-25

## 2022-02-24 RX ORDER — DIAZEPAM 10 MG
10 TABLET ORAL
Status: CANCELLED | OUTPATIENT
Start: 2022-02-25

## 2022-02-24 RX ORDER — FENTANYL CITRATE 50 UG/ML
25 INJECTION, SOLUTION INTRAMUSCULAR; INTRAVENOUS
Status: CANCELLED | OUTPATIENT
Start: 2022-02-24

## 2022-02-24 RX ORDER — SODIUM CHLORIDE 9 MG/ML
INJECTION, SOLUTION INTRAVENOUS CONTINUOUS
Status: CANCELLED | OUTPATIENT
Start: 2022-02-25

## 2022-02-24 RX ORDER — LIDOCAINE 40 MG/G
CREAM TOPICAL
Status: CANCELLED | OUTPATIENT
Start: 2022-02-24

## 2022-02-24 RX ORDER — ASPIRIN 325 MG
325 TABLET ORAL ONCE
Status: CANCELLED | OUTPATIENT
Start: 2022-02-25 | End: 2022-02-24

## 2022-02-24 NOTE — TELEPHONE ENCOUNTER
Venancio Tolentino  [address]  [facility] [address]  [phone] (home)     PCP:  Marlen Daniels  H&P completed by:  2/1/22  Admit date: 2/25/22 Arrival time:  0730  Anticoagulation:  NA  Previous PCI: Yes  Bypass Grafts: No  Renal Issues: No  Diabetic?: No  Device?: No  Type:  n/a  Ambulation status: independent    Reason for Visit:  Telephone call to discuss pre-procedure education in preparation for: Coronary Angiogram with Possible PCI    Procedure Prep:  EKG results obtained, dated: To be completed on day of cath lab procedure  Hemogram results obtained: To be completed on day of cath lab procedure  Basic Metabolic Panel results obtained: To be completed on day of cath lab procedure  Pertinent cardiac test results: 2/14/22, Stress Echo  COVID-19 test results obtained: Completed within [place]     Patient Education    1. Your arrival time is 0730a.  [place] [address] - Main Entrance of the Hospital  2. Please plan on being at the hospital all day.  3. At any time, emergencies and/or urgent cases may come up which could delay the start of your procedure.    COVID Testing Instructions  *Mandatory COVID Testing:   ALL Patients will need to complete a COVID test no sooner than 4 days prior to their procedure (regardless of vaccination status).      To schedule COVID testing Please call [phone]    If you want to complete this at an outside facility please call them to find out if they will have the results within the appropriate time frame and their fax number.  You will need to provide us with that information so we can send the order.    The facility completing the test will need to fax the results to [phone]    If you are running into and issues please call us.     Pre-procedure instructions  Patient instructed to not Eat or Drink after midnight.  Patient instructed to shower the evening before or the morning of the  procedure.  Patient instructed to arrange for transportation home following procedure from a responsible family member or friend. No driving for at least 24 hours.  Patient instructed to have a responsible adult with them for 24 hours post-procedure.  Post-procedure follow up process.  Conscious sedation discussed.      Pre-procedure medication instructions.  Continue medications as scheduled, with a small amount of water on the day of the procedure unless indicated. (NO Diabetic Medications or Blood Thinners)  Pt instructed not to consume Alcohol, Tobacco, Caffeine, or Carbonated beverages 12 hours prior to procedure.  Patient instructed to take 324 mg of Aspirin the morning of procedure: Yes     Patient states understanding of procedure and agrees to proceed.    *PATIENTS RECORDS AVAILABLE IN [facility] UNLESS OTHERWISE INDICATED*      Patient Active Problem List   Diagnosis     Essential hypertension, benign     Tobacco use disorder     Coronary artery disease involving native coronary artery of native heart without angina pectoris     Hyperlipidemia, unspecified hyperlipidemia type       Current Outpatient Medications   Medication Sig Dispense Refill     aspirin (ASA) 81 MG chewable tablet Take 2 tablets (162 mg) by mouth daily       coenzyme Q-10 capsule Take 1 capsule by mouth daily       fish oil-omega-3 fatty acids 1000 MG capsule Take 2 g by mouth daily       gemfibrozil (LOPID) 600 MG tablet Take 1 tablet (600 mg) by mouth 2 times daily 180 tablet 11     lisinopril (ZESTRIL) 10 MG tablet Take 1 tablet (10 mg) by mouth daily 90 tablet 0     metoprolol succinate ER (TOPROL-XL) 100 MG 24 hr tablet Take 1 tablet (100 mg) by mouth daily 90 tablet 0     rosuvastatin (CRESTOR) 20 MG tablet Take 1 tablet (20 mg) by mouth daily 90 tablet 0       No Known Allergies    Shasta Gtz RN on 2/23/2022 at 10:45 AM

## 2022-02-25 ENCOUNTER — HOSPITAL ENCOUNTER (OUTPATIENT)
Facility: HOSPITAL | Age: 62
Discharge: HOME | End: 2022-02-25
Attending: INTERNAL MEDICINE | Admitting: INTERNAL MEDICINE
Payer: COMMERCIAL

## 2022-02-25 VITALS
HEART RATE: 68 BPM | DIASTOLIC BLOOD PRESSURE: 67 MMHG | RESPIRATION RATE: 22 BRPM | SYSTOLIC BLOOD PRESSURE: 123 MMHG | BODY MASS INDEX: 25.83 KG/M2 | OXYGEN SATURATION: 97 % | WEIGHT: 155 LBS | TEMPERATURE: 98 F | HEIGHT: 65 IN

## 2022-02-25 DIAGNOSIS — I50.30 HEART FAILURE WITH PRESERVED EJECTION FRACTION, NYHA CLASS II (H): ICD-10-CM

## 2022-02-25 DIAGNOSIS — E78.5 HYPERLIPIDEMIA, UNSPECIFIED HYPERLIPIDEMIA TYPE: Primary | ICD-10-CM

## 2022-02-25 DIAGNOSIS — I10 ESSENTIAL HYPERTENSION, BENIGN: ICD-10-CM

## 2022-02-25 DIAGNOSIS — R94.39 ABNORMAL CARDIOVASCULAR STRESS TEST: ICD-10-CM

## 2022-02-25 DIAGNOSIS — I25.10 CORONARY ARTERY DISEASE INVOLVING NATIVE CORONARY ARTERY OF NATIVE HEART WITHOUT ANGINA PECTORIS: ICD-10-CM

## 2022-02-25 LAB
ABO/RH(D): NORMAL
ANION GAP SERPL CALCULATED.3IONS-SCNC: 7 MMOL/L (ref 5–18)
ANTIBODY SCREEN: NEGATIVE
ATRIAL RATE - MUSE: 68 BPM
BUN SERPL-MCNC: 14 MG/DL (ref 8–22)
CALCIUM SERPL-MCNC: 9 MG/DL (ref 8.5–10.5)
CHLORIDE BLD-SCNC: 105 MMOL/L (ref 98–107)
CO2 SERPL-SCNC: 26 MMOL/L (ref 22–31)
CREAT SERPL-MCNC: 0.82 MG/DL (ref 0.7–1.3)
DIASTOLIC BLOOD PRESSURE - MUSE: NORMAL MMHG
ERYTHROCYTE [DISTWIDTH] IN BLOOD BY AUTOMATED COUNT: 11.8 % (ref 10–15)
GFR SERPL CREATININE-BSD FRML MDRD: >90 ML/MIN/1.73M2
GLUCOSE BLD-MCNC: 93 MG/DL (ref 70–125)
HCT VFR BLD AUTO: 42.7 % (ref 40–53)
HGB BLD-MCNC: 15.3 G/DL (ref 13.3–17.7)
INTERPRETATION ECG - MUSE: NORMAL
MCH RBC QN AUTO: 32.8 PG (ref 26.5–33)
MCHC RBC AUTO-ENTMCNC: 35.8 G/DL (ref 31.5–36.5)
MCV RBC AUTO: 91 FL (ref 78–100)
P AXIS - MUSE: 69 DEGREES
PLATELET # BLD AUTO: 298 10E3/UL (ref 150–450)
POTASSIUM BLD-SCNC: 4.1 MMOL/L (ref 3.5–5)
PR INTERVAL - MUSE: 174 MS
QRS DURATION - MUSE: 74 MS
QT - MUSE: 418 MS
QTC - MUSE: 444 MS
R AXIS - MUSE: 5 DEGREES
RBC # BLD AUTO: 4.67 10E6/UL (ref 4.4–5.9)
SODIUM SERPL-SCNC: 138 MMOL/L (ref 136–145)
SPECIMEN EXPIRATION DATE: NORMAL
SYSTOLIC BLOOD PRESSURE - MUSE: NORMAL MMHG
T AXIS - MUSE: 74 DEGREES
VENTRICULAR RATE- MUSE: 68 BPM
WBC # BLD AUTO: 6.5 10E3/UL (ref 4–11)

## 2022-02-25 PROCEDURE — 255N000002 HC RX 255 OP 636: Performed by: INTERNAL MEDICINE

## 2022-02-25 PROCEDURE — C1725 CATH, TRANSLUMIN NON-LASER: HCPCS | Performed by: INTERNAL MEDICINE

## 2022-02-25 PROCEDURE — 272N000001 HC OR GENERAL SUPPLY STERILE: Performed by: INTERNAL MEDICINE

## 2022-02-25 PROCEDURE — 93010 ELECTROCARDIOGRAM REPORT: CPT | Performed by: INTERNAL MEDICINE

## 2022-02-25 PROCEDURE — 86901 BLOOD TYPING SEROLOGIC RH(D): CPT | Performed by: INTERNAL MEDICINE

## 2022-02-25 PROCEDURE — 250N000009 HC RX 250: Performed by: INTERNAL MEDICINE

## 2022-02-25 PROCEDURE — 86850 RBC ANTIBODY SCREEN: CPT | Performed by: INTERNAL MEDICINE

## 2022-02-25 PROCEDURE — 258N000003 HC RX IP 258 OP 636: Performed by: INTERNAL MEDICINE

## 2022-02-25 PROCEDURE — C1894 INTRO/SHEATH, NON-LASER: HCPCS | Performed by: INTERNAL MEDICINE

## 2022-02-25 PROCEDURE — 93005 ELECTROCARDIOGRAM TRACING: CPT

## 2022-02-25 PROCEDURE — 93458 L HRT ARTERY/VENTRICLE ANGIO: CPT | Mod: 26 | Performed by: INTERNAL MEDICINE

## 2022-02-25 PROCEDURE — 250N000013 HC RX MED GY IP 250 OP 250 PS 637: Performed by: INTERNAL MEDICINE

## 2022-02-25 PROCEDURE — 80048 BASIC METABOLIC PNL TOTAL CA: CPT | Performed by: INTERNAL MEDICINE

## 2022-02-25 PROCEDURE — 93458 L HRT ARTERY/VENTRICLE ANGIO: CPT | Performed by: INTERNAL MEDICINE

## 2022-02-25 PROCEDURE — C1769 GUIDE WIRE: HCPCS | Performed by: INTERNAL MEDICINE

## 2022-02-25 PROCEDURE — 93571 IV DOP VEL&/PRESS C FLO 1ST: CPT

## 2022-02-25 PROCEDURE — 999N000054 HC STATISTIC EKG NON-CHARGEABLE

## 2022-02-25 PROCEDURE — 250N000011 HC RX IP 250 OP 636: Performed by: INTERNAL MEDICINE

## 2022-02-25 PROCEDURE — 85027 COMPLETE CBC AUTOMATED: CPT | Performed by: INTERNAL MEDICINE

## 2022-02-25 PROCEDURE — 36415 COLL VENOUS BLD VENIPUNCTURE: CPT | Performed by: INTERNAL MEDICINE

## 2022-02-25 RX ORDER — FENTANYL CITRATE 50 UG/ML
INJECTION, SOLUTION INTRAMUSCULAR; INTRAVENOUS
Status: DISCONTINUED | OUTPATIENT
Start: 2022-02-25 | End: 2022-02-25 | Stop reason: HOSPADM

## 2022-02-25 RX ORDER — NALOXONE HYDROCHLORIDE 0.4 MG/ML
0.2 INJECTION, SOLUTION INTRAMUSCULAR; INTRAVENOUS; SUBCUTANEOUS
Status: DISCONTINUED | OUTPATIENT
Start: 2022-02-25 | End: 2022-02-25 | Stop reason: HOSPADM

## 2022-02-25 RX ORDER — FENTANYL CITRATE 50 UG/ML
25 INJECTION, SOLUTION INTRAMUSCULAR; INTRAVENOUS
Status: DISCONTINUED | OUTPATIENT
Start: 2022-02-25 | End: 2022-02-25 | Stop reason: HOSPADM

## 2022-02-25 RX ORDER — DIAZEPAM 5 MG
10 TABLET ORAL
Status: COMPLETED | OUTPATIENT
Start: 2022-02-25 | End: 2022-02-25

## 2022-02-25 RX ORDER — SODIUM CHLORIDE 9 MG/ML
INJECTION, SOLUTION INTRAVENOUS CONTINUOUS
Status: DISCONTINUED | OUTPATIENT
Start: 2022-02-25 | End: 2022-02-25 | Stop reason: HOSPADM

## 2022-02-25 RX ORDER — ASPIRIN 325 MG
325 TABLET ORAL ONCE
Status: DISCONTINUED | OUTPATIENT
Start: 2022-02-25 | End: 2022-02-25 | Stop reason: HOSPADM

## 2022-02-25 RX ORDER — OXYCODONE HYDROCHLORIDE 5 MG/1
10 TABLET ORAL EVERY 4 HOURS PRN
Status: DISCONTINUED | OUTPATIENT
Start: 2022-02-25 | End: 2022-02-25 | Stop reason: HOSPADM

## 2022-02-25 RX ORDER — LIDOCAINE 40 MG/G
CREAM TOPICAL
Status: DISCONTINUED | OUTPATIENT
Start: 2022-02-25 | End: 2022-02-25 | Stop reason: HOSPADM

## 2022-02-25 RX ORDER — NITROGLYCERIN 0.4 MG/1
TABLET SUBLINGUAL
Qty: 25 TABLET | Refills: 3 | Status: SHIPPED | OUTPATIENT
Start: 2022-02-25

## 2022-02-25 RX ORDER — ASPIRIN 81 MG/1
243 TABLET, CHEWABLE ORAL ONCE
Status: DISCONTINUED | OUTPATIENT
Start: 2022-02-25 | End: 2022-02-25 | Stop reason: HOSPADM

## 2022-02-25 RX ORDER — NALOXONE HYDROCHLORIDE 0.4 MG/ML
0.4 INJECTION, SOLUTION INTRAMUSCULAR; INTRAVENOUS; SUBCUTANEOUS
Status: DISCONTINUED | OUTPATIENT
Start: 2022-02-25 | End: 2022-02-25 | Stop reason: HOSPADM

## 2022-02-25 RX ORDER — FUROSEMIDE 20 MG
20 TABLET ORAL DAILY
Qty: 30 TABLET | Refills: 0 | Status: SHIPPED | OUTPATIENT
Start: 2022-02-25 | End: 2022-03-16

## 2022-02-25 RX ORDER — ATROPINE SULFATE 0.1 MG/ML
0.5 INJECTION INTRAVENOUS
Status: DISCONTINUED | OUTPATIENT
Start: 2022-02-25 | End: 2022-02-25 | Stop reason: HOSPADM

## 2022-02-25 RX ORDER — ACETAMINOPHEN 325 MG/1
650 TABLET ORAL EVERY 4 HOURS PRN
Status: DISCONTINUED | OUTPATIENT
Start: 2022-02-25 | End: 2022-02-25 | Stop reason: HOSPADM

## 2022-02-25 RX ORDER — LISINOPRIL 20 MG/1
20 TABLET ORAL DAILY
Qty: 90 TABLET | Refills: 3 | Status: SHIPPED | OUTPATIENT
Start: 2022-02-25 | End: 2022-04-25

## 2022-02-25 RX ORDER — FLUMAZENIL 0.1 MG/ML
0.2 INJECTION, SOLUTION INTRAVENOUS
Status: DISCONTINUED | OUTPATIENT
Start: 2022-02-25 | End: 2022-02-25 | Stop reason: HOSPADM

## 2022-02-25 RX ORDER — IODIXANOL 320 MG/ML
INJECTION, SOLUTION INTRAVASCULAR
Status: DISCONTINUED | OUTPATIENT
Start: 2022-02-25 | End: 2022-02-25 | Stop reason: HOSPADM

## 2022-02-25 RX ORDER — FUROSEMIDE 20 MG
20 TABLET ORAL ONCE
Status: DISCONTINUED | OUTPATIENT
Start: 2022-02-25 | End: 2022-03-16 | Stop reason: CLARIF

## 2022-02-25 RX ORDER — OXYCODONE HYDROCHLORIDE 5 MG/1
5 TABLET ORAL EVERY 4 HOURS PRN
Status: DISCONTINUED | OUTPATIENT
Start: 2022-02-25 | End: 2022-02-25 | Stop reason: HOSPADM

## 2022-02-25 RX ADMIN — SODIUM CHLORIDE: 9 INJECTION, SOLUTION INTRAVENOUS at 09:27

## 2022-02-25 RX ADMIN — DIAZEPAM 10 MG: 5 TABLET ORAL at 11:55

## 2022-02-25 ASSESSMENT — EJECTION FRACTION: EF_VALUE: .24

## 2022-02-25 NOTE — PRE-PROCEDURE
GENERAL PRE-PROCEDURE:   Date/Time:  2/25/2022 11:46 PM    Written consent obtained?: Yes    Risks and benefits: Risks, benefits and alternatives were discussed    Consent given by:  Patient  Patient states understanding of procedure being performed: Yes    Patient's understanding of procedure matches consent: Yes    Procedure consent matches procedure scheduled: Yes    Expected level of sedation:  Moderate  Appropriately NPO:  Yes  Mallampati  :  Grade 1- soft palate, uvula, tonsillar pillars, and posterior pharyngeal wall visible  Lungs:  Lungs clear with good breath sounds bilaterally  Heart:  Normal heart sounds and rate  History & Physical reviewed:  History and physical reviewed and no updates needed  Statement of review:  I have reviewed the lab findings, diagnostic data, medications, and the plan for sedation

## 2022-02-25 NOTE — Clinical Note
Hemodynamic equipment used: 5 lead ECG, AKSEL GROUPK With 3 Leads, Machine BP Cuff and pulse oximeter probe.

## 2022-02-25 NOTE — DISCHARGE INSTRUCTIONS
Lasix Oral Tablet 20 mg  Uses  This medicine is used for the following purposes:    high blood pressure    swelling  Instructions  This medicine may be taken with or without food.  It is very important that you take the medicine at about the same time every day. It will work best if you do this.  Keep the medicine at room temperature. Avoid heat and direct light.  This medicine will make you urinate more. If you have difficulty passing urine, please tell your doctor.  This medicine may cause you to become more sensitive to the sun. Use sunscreen or wear protective clothing when you are exposed to the sun.  It is important that you keep taking each dose of this medicine on time even if you are feeling well.  If you forget to take a dose on time, take it as soon as you remember. If it is almost time for the next dose, do not take the missed dose. Return to your normal dosing schedule. Do not take 2 doses of this medicine at one time.  Please tell your doctor and pharmacist about all the medicines you take. Include both prescription and over-the-counter medicines. Also tell them about any vitamins, herbal medicines, or anything else you take for your health.  Do not suddenly stop taking this medicine. Check with your doctor before stopping.  It is very important that you follow your doctor's instructions for all blood tests.  This medicine may interfere with some lab test results. Be sure to tell all your healthcare providers that you are taking this medicine.  Cautions  Tell your doctor and pharmacist if you ever had an allergic reaction to a medicine. Symptoms of an allergic reaction can include trouble breathing, skin rash, itching, swelling, or severe dizziness.  Some patients taking this medicine have experienced serious side effects. Please speak with your doctor to understand the risks and benefits associated with this medicine.  Do not use the medication any more than instructed.  This medicine may cause  dizziness or fainting, especially after exercising or in hot weather. Be very careful when standing or sitting up quickly.  Your ability to stay alert or to react quickly may be impaired by this medicine. Do not drive or operate machinery until you know how this medicine will affect you.  Please check with your doctor before drinking alcohol while on this medicine.  Tell the doctor or pharmacist if you are pregnant, planning to be pregnant, or breastfeeding.  Ask your pharmacist if this medicine can interact with any of your other medicines. Be sure to tell them about all the medicines you take.  Do not start or stop any other medicines without first speaking to your doctor or pharmacist.  Do not share this medicine with anyone who has not been prescribed this medicine.  Side Effects  The following is a list of some common side effects from this medicine. Please speak with your doctor about what you should do if you experience these or other side effects.    constipation    dizziness    dry mouth    lack of energy and tiredness    headaches    high blood sugar    low blood pressure    liver problems    skin irritation such as redness, itching, rash, or burning    stomach upset or abdominal pain    increased risk of sunburn    increased urinary frequency    blurring or changes of vision  If you have any of the following side effects, you may be getting too much medicine. Please contact your doctor to let them know about these side effects.    confusion    drowsiness or sedation    fainting    irritability    muscle cramps    muscle pain    tight or rigid muscles    muscle weakness    feeling of numbness or tingling in your hands and feet    thirst    unsteadiness while walking    urinating less often    dark urine  Call your doctor or get medical help right away if you notice any of these more serious side effects:    shallow, irregular breathing    unusual bruising or discoloration on skin    changes in memory,  mood, or thinking    ear problems (ringing in the ears, hearing loss)    fast or irregular heart beats    kidney problems    kidney stones    symptoms of liver damage (such as yellowing of skin or eyes, dark urine, unusual tiredness or weakness; severe stomach or back pain)    seizures    light colored stool    unusual or unexplained tiredness or weakness    severe or persistent vomiting  A few people may have an allergic reactions to this medicine. Symptoms can include difficulty breathing, skin rash, itching, swelling, or severe dizziness. If you notice any of these symptoms, seek medical help quickly.  Extra  Please speak with your doctor, nurse, or pharmacist if you have any questions about this medicine.  [email or web address]  IMPORTANT NOTE: This document tells you briefly how to take your medicine, but it does not tell you all there is to know about it.Your doctor or pharmacist may give you other documents about your medicine. Please talk to them if you have any questions.Always follow their advice. There is a more complete description of this medicine available in English.Scan this code on your smartphone or tablet or use the web address below. You can also ask your pharmacist for a printout. If you have any questions, please ask your pharmacist.     2021 [employer]riptrotting.      Interventional Cardiology  Coronary Angiogram/Angioplasty/Stent/Atherectomy Discharge  Instructions -   Radial (wrist) Approach     The instructions below are to help you understand how to take care of yourself. There is also information about when to call the doctor or emergency services.    **Do not stop your aspirin or platelet inhibitor unless directed by your Cardiologist.  These medications help to prevent platelets in your blood from sticking together and forming a clot.   Examples of these medications are: Ticagrelor (Brilinta), Clopidogrel (Plavix), Prasugrel (Effient)    For 24 hours after  procedure:    Do not subject hand/arm to any forceful movements for 24 hours, such as supporting weight when rising from a chair or bed.    Do not drive a car for 24 hours.    The dressing on the puncture site may be removed after 24 hours and left open to air. If minor oozing, you may apply a Band-Aid and remove after 12 hours.     You may shower on the day after your procedure. Do not take a tub bath or wash dishes (no soaking wrist) with the puncture site in water for 3 days after the procedure.    For 48 hours following the procedure:    Do not operate a lawnmower, motorcycle, chainsaw or all-terrain vehicle.    Do not lift anything heavier than 5-10 pounds with affected arm for 5 days.    Avoid excessive bending (flexion/extension) wrist movement.    Do not engage in vigorous exercise (i.e. tennis, golf) using the affected arm for 5 days after discharge.    You may return to work in 72 hours if no complications and no heavy lifting.    If bleeding should occur following discharge:    Sit down and apply firm pressure with your thumb against the puncture site and fingers against back of wrist for 10 minutes.    If the bleeding stops, continue to rest, keeping your wrist still for 2 hours. Notify your doctor as soon as possible.    If bleeding does not stop after 10 minutes or if there is a large amount of bleeding or spurting, call 911 immediately. Do not drive yourself to the hospital.           Contact the Heart Clinic at [phone] if you develop:    Fever over 100.4, that lasts more than one day    Redness, heat, or pus at the puncture site    Change in color or temperature of the hand or arm    Expect mild tingling of hand and tenderness at the puncture site for up to 3 days. You may take Tylenol or a pain medicine recommended by your doctor.                       Our Cardiac Rehab staff may visit briefly with you while your in the hospital.   If they miss you, someone will contact you after you are home.   You are encouraged to enroll in an Outpatient Cardiac Rehab Program     No elective dental work for 6 weeks after having a stent    Your Procedural Physician was: Dr. Nona George  the phone number is: [phone]    [facility]:  [phone]  If you are calling after hours, please listen to the entire voicemail, a live  will answer at the end of the message    * Recovery After Conscious Sedation (Adult)  We gave you medicine by vein to make you sleepy or relaxed during your procedure. This may have included both a pain medicine and sleeping medicine. Most of the effects have worn off. But you may still feel sleepy for the next 6 to 8 hours.  Home care  Follow these guidelines when you get home:    You may feel sleepy and clumsy and have poor balance for the next few hours.    A responsible adult should stay with you for the next 8 hours. This person should make sure your condition doesn t get worse.    Don't drink any alcohol for the next 24 hours.    Don't drive, operate dangerous machinery, make important business or personal decisions or sign legal documents during the next 24 hours.    You may vomit (throw up) if you eat too soon after the procedure. If this happens, drink small amounts of water, juice or clear broth. Wait to try solid food until you no longer have nausea (upset stomach).  Note: Your care team may tell you not to take any medicine by mouth for pain or sleep in the next 4 hours. These medicines may react with the medicines you had in the hospital. This could cause a much stronger response than usual.  Follow-up care  Follow up with your care team if you are not alert and back to your usual level of activity within 12 hours.  When to seek medical advice  Call your care team right away if any of these occur:    You still feel sleepy or clumsy after 12 hours, or your sleepiness gets worse    Weakness or dizziness gets worse    Repeated vomiting    If you  can't be woken up and someone is staying with you, they should call 911.  Date Last Reviewed: 10/18/2016    1256-7328 [employer]. [address] [place] [address], [place] [address]. All rights reserved. This information is not intended as a substitute for professional medical care. Always follow your healthcare professional's instructions.  This information has been modified by your health care provider with permission from the publisher.

## 2022-02-26 NOTE — PROGRESS NOTES
Discharge home.  Site stable.  No questions.  New Rx and change in dose are at his local pharmacy.  Follow up with Dr Wilburn and Heart failure NP scheduled.  Wife  home.

## 2022-03-16 ENCOUNTER — TELEPHONE (OUTPATIENT)
Dept: CARDIOLOGY | Facility: CLINIC | Age: 62
End: 2022-03-16

## 2022-03-16 ENCOUNTER — APPOINTMENT (OUTPATIENT)
Dept: CARDIOLOGY | Facility: CLINIC | Age: 62
End: 2022-03-16
Payer: COMMERCIAL

## 2022-03-16 ENCOUNTER — OFFICE VISIT (OUTPATIENT)
Dept: CARDIOLOGY | Facility: CLINIC | Age: 62
End: 2022-03-16
Payer: COMMERCIAL

## 2022-03-16 VITALS
SYSTOLIC BLOOD PRESSURE: 122 MMHG | WEIGHT: 156.9 LBS | HEIGHT: 65 IN | BODY MASS INDEX: 26.14 KG/M2 | HEART RATE: 64 BPM | RESPIRATION RATE: 12 BRPM | DIASTOLIC BLOOD PRESSURE: 70 MMHG

## 2022-03-16 DIAGNOSIS — G47.33 OSA (OBSTRUCTIVE SLEEP APNEA): ICD-10-CM

## 2022-03-16 DIAGNOSIS — E78.5 HYPERLIPIDEMIA, UNSPECIFIED HYPERLIPIDEMIA TYPE: ICD-10-CM

## 2022-03-16 DIAGNOSIS — I10 ESSENTIAL HYPERTENSION, BENIGN: ICD-10-CM

## 2022-03-16 DIAGNOSIS — I50.31 ACUTE DIASTOLIC HEART FAILURE (H): Primary | ICD-10-CM

## 2022-03-16 DIAGNOSIS — I25.10 CORONARY ARTERY DISEASE INVOLVING NATIVE CORONARY ARTERY OF NATIVE HEART WITHOUT ANGINA PECTORIS: ICD-10-CM

## 2022-03-16 LAB
ANION GAP SERPL CALCULATED.3IONS-SCNC: 8 MMOL/L (ref 5–18)
BNP SERPL-MCNC: 34 PG/ML (ref 0–53)
BUN SERPL-MCNC: 13 MG/DL (ref 8–22)
CALCIUM SERPL-MCNC: 9.6 MG/DL (ref 8.5–10.5)
CHLORIDE BLD-SCNC: 100 MMOL/L (ref 98–107)
CO2 SERPL-SCNC: 29 MMOL/L (ref 22–31)
CREAT SERPL-MCNC: 0.87 MG/DL (ref 0.7–1.3)
GFR SERPL CREATININE-BSD FRML MDRD: >90 ML/MIN/1.73M2
GLUCOSE BLD-MCNC: 97 MG/DL (ref 70–125)
POTASSIUM BLD-SCNC: 4.2 MMOL/L (ref 3.5–5)
SODIUM SERPL-SCNC: 137 MMOL/L (ref 136–145)

## 2022-03-16 PROCEDURE — 36415 COLL VENOUS BLD VENIPUNCTURE: CPT | Performed by: NURSE PRACTITIONER

## 2022-03-16 PROCEDURE — 83880 ASSAY OF NATRIURETIC PEPTIDE: CPT | Performed by: NURSE PRACTITIONER

## 2022-03-16 PROCEDURE — 80048 BASIC METABOLIC PNL TOTAL CA: CPT | Performed by: NURSE PRACTITIONER

## 2022-03-16 PROCEDURE — 99214 OFFICE O/P EST MOD 30 MIN: CPT | Performed by: NURSE PRACTITIONER

## 2022-03-16 RX ORDER — FUROSEMIDE 20 MG
20 TABLET ORAL DAILY
Qty: 90 TABLET | Refills: 1 | Status: SHIPPED | OUTPATIENT
Start: 2022-03-16 | End: 2022-04-25

## 2022-03-16 NOTE — TELEPHONE ENCOUNTER
Pt education done after OV with Jackie Platt  Reviewed CHF folder with Nurse line phone number, daily wt tracking book, fluid goal for each day, and NA restrictions.   Spoke about How I manage My Heart Failure. Encouraged pt to stay in the [place].   Gave pt/family information from the Self-help guide for patients with Heart Failure.  Review medications with pt and how to look at Information about your Heart Medications. Explained that medications may change and doses may go up and down. Important to monitor Blood pressure and pulse along with wt with any medication change at visits.    Review all with pt.   Reviewed use of lasix and yet staying hydrated. Reviewed wt book and daily wts and BP. Low Na diet and watching eating out. Folder given. Will follow up with Jackie in 2-3 weeks.

## 2022-03-16 NOTE — PROGRESS NOTES
Assessment/Recommendations   Assessment:    1.  Acute diastolic heart failure, NYHA class II: Recent coronary angiogram showed elevated LVEDP at 25.  Patient was initiated on furosemide 20 mg daily.    Today, patient is well compensated with no evidence of fluid retention on assessment.  He noticed improvement in his shortness of breath since initiated on furosemide.    Current weight is 153 to 155 pounds at home   He is not following low-sodium diet.  He eats out often.    We discussed and reviewed about heart failure, medication management, and lifestyle management including low sodium diet <2 g/day, daily weight, and staying physically active as tolerated. Patient met with the nurse clinician for heart failure education.    2. Coronary artery disease with previous history of PCI to RCA and LAD: Coronary angiogram on 2/22/2022 showed patent stents in RCA and LAD otherwise mild coronary disease.    On aspirin 160 mg daily.    Denies chest pain.    3  Dyslipidemia with LDL goal <70/ Obesity/ on statin therapy with on rosuvastatin 20 mg daily. . Most recent LDL is in 40s-at goal.  Most recent AST/ALT are stable in January 2022    4.  Hypertension: Blood pressure today is under excellent control.  Blood pressure improved since lisinopril dose was increased to 10 mg daily post coronary angiogram.    5.  Obstructive sleep apnea: History of intolerance to CPAP mask.  We discussed about the correlation between obstructive sleep apnea, coronary disease, hypertension, and diastolic heart failure.    Plan/Recommendation:   -BMP/BNP pending.    -Continue medical therapy for coronary disease  -Continue current hypertension regimen  -Highly encouraged to follow-up with sleep clinic-referral in place     Follow-up with Dr. Wilburn scheduled on May 2.  Follow-up with me in 2 to 3 weeks     History of Present Illness/Subjective    Mr. Venancio Tolentino is a 61 year old male with a past medical history of coronary artery  disease with status post PCI to RCA and LAD in the past, hypertension, hyperlipidemia, former smoker, obstructive sleep apnea, and recent diagnosis of acute ST heart failure who is seen at [facility] for coronary angiogram follow up.    Patient saw Dr. Wilburn in February with concerns about dyspnea on exertion.  He underwent coronary angiogram showed patent stents in RCA and LAD but noted elevated LVEDP.  Patient was initiated on diuretic therapy.  Blood pressure was found elevated.  Lisinopril dose was increased.    Most recent echocardiogram done in January 2021 showed preserved LVEF with no significant valve abnormalities noted.    Today, Yifan reports feeling improvement in his shortness of breath since on diuretic therapy.    He denies fatigue, lightheadedness, shortness of breath, orthopnea, PND, palpitations, chest pain, abdominal fullness/bloating and lower extremity edema.  He does not follow low-sodium diet.  He eats out often.  He has not follow-up with sleep clinic yet.    Coronary Angiogram done on 2/25/2022: reviewed:  Conclusion    Dyspnea on exertion and some chest tightness in a man with previous coronary PCI. Stress echo suggests distal ischemia.    Grossly normal left main.    Widely patent proximal and distal LAD stents with minimal atherosclerosis otherwise.    Minimal LCx disease.    Mid RCA stents are widely patent and there is minimal atherosclerosis otherwise.    LV EDP 25 mmHg. LV-Ao gradient 12 mmHg. Echo 1 year ago did not suggest aortic valve disease or LVOT issues.    ECHO Milady January 2021-Reviewed:   Narrative & Impression    Left ventricle ejection fraction is normal. The calculated left ventricular ejection fraction is 56%.    Normal right ventricular size and systolic function.    Mild mitral and tricuspid insufficiency. No evidence of pulmonary hypertension.    When compared to the previous study dated 11/2/2015, no significant interval  "change.     Physical Examination Review of Systems   /70 (BP Location: Left arm, Patient Position: Sitting, Cuff Size: Adult Regular)   Pulse 64   Resp 12   Ht 1.651 m (5' 5\")   Wt 71.2 kg (156 lb 14.4 oz)   BMI 26.11 kg/m    Body mass index is 26.11 kg/m .  Wt Readings from Last 3 Encounters:   03/16/22 71.2 kg (156 lb 14.4 oz)   02/25/22 70.3 kg (155 lb)   02/01/22 69.9 kg (154 lb)     General Appearance:   no distress, normal body habitus   ENT/Mouth: membranes moist, no oral lesions or bleeding gums.      EYES:  no scleral icterus, normal conjunctivae   Neck: no carotid bruits or thyromegaly   Chest/Lungs:   lungs are clear to auscultation, no rales or wheezing, equal chest wall expansion    Cardiovascular:    Heart rate regular. Normal first and second heart sounds with no murmurs, rubs, or gallops; the carotid, radial and posterior tibial pulses are intact, Jugular venous pressure flat with no edema bilaterally    Abdomen:  no organomegaly, masses, bruits, or tenderness; bowel sounds are present   Extremities   no cyanosis or clubbing   Radial pulses and Pedal pulses intact and symmetrical.  CMS intact.   Skin: no xanthelasma, warm.    Neurologic: normal  bilateral, no tremors   Psychiatric: alert and oriented x3, calm           Negative unless noted in HPI     Medical History  Surgical History Family History Social History   Past Medical History:   Diagnosis Date     Tobacco use disorder 9/8/2006    Past Surgical History:   Procedure Laterality Date     CARDIAC CATHETERIZATION  2014     CARDIAC CATHETERIZATION  2015     CARDIAC CATHETERIZATION  05/04/2018     CORONARY STENT PLACEMENT      rca and lad     CV CORONARY ANGIOGRAM N/A 5/4/2018    Procedure: Coronary Angiogram;  Surgeon: Frank Wilburn MD;  Location: [facility] Cath Lab;  Service:      CV CORONARY ANGIOGRAM N/A 2/25/2022    Procedure: Coronary Angiogram;  Surgeon: Nona George MD;  Location: [facility] CATH LAB CV     CV " LEFT HEART CATH N/A 2/25/2022    Procedure: Left Heart Cath;  Surgeon: Nona George MD;  Location: [facility] CATH LAB CV     CV LEFT HEART CATHETERIZATION WITH LEFT VENTRICULOGRAM N/A 5/4/2018    Procedure: Left Heart Catheterization with Left Ventriculogram;  Surgeon: Frank Wilburn MD;  Location: [facility] Cath Lab;  Service:      HERNIA REPAIR       SURGICAL HISTORY OF -   1993    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     SURGICAL HISTORY OF -   7/7/2000    Right inguinal hernia with high ligation of indirect hernia sac and placement of Prolene mesh    Family History   Problem Relation Age of Onset     Hypertension Mother      Cerebrovascular Disease Mother      Cardiovascular Mother         Marfan's Syndrome     Respiratory Father         emphesema     Snoring Father      Asthma Sister      Cardiovascular Sister         aortic aneurism Marfan's syndrome.    Social History     Socioeconomic History     Marital status:      Spouse name: Linda     Number of children: 3     Years of education: Not on file     Highest education level: Not on file   Occupational History     Not on file   Tobacco Use     Smoking status: Former Smoker     Packs/day: 1.00     Years: 25.00     Pack years: 25.00     Types: Cigarettes     Quit date: 2012     Years since quitting: 10.2     Smokeless tobacco: Never Used   Substance and Sexual Activity     Alcohol use: No     Drug use: Never     Sexual activity: Not Currently     Partners: Female   Other Topics Concern     Not on file   Social History Narrative     Not on file     Social Determinants of Health     Financial Resource Strain: Not on file   Food Insecurity: Not on file   Transportation Needs: Not on file   Physical Activity: Not on file   Stress: Not on file   Social Connections: Not on file   Intimate Partner Violence: Not on file   Housing Stability: Not on file          Medications  Allergies   Current Outpatient Medications  "  Medication Sig Dispense Refill     aspirin (ASA) 81 MG chewable tablet Take 2 tablets (162 mg) by mouth daily       coenzyme Q-10 capsule Take 1 capsule by mouth daily       fish oil-omega-3 fatty acids 1000 MG capsule Take 2 g by mouth daily       furosemide (LASIX) 20 MG tablet Take 1 tablet (20 mg) by mouth daily 30 tablet 0     gemfibrozil (LOPID) 600 MG tablet Take 1 tablet (600 mg) by mouth 2 times daily 180 tablet 11     lisinopril (ZESTRIL) 20 MG tablet Take 1 tablet (20 mg) by mouth daily 90 tablet 3     metoprolol succinate ER (TOPROL-XL) 100 MG 24 hr tablet Take 1 tablet (100 mg) by mouth daily 90 tablet 0     rosuvastatin (CRESTOR) 20 MG tablet Take 1 tablet (20 mg) by mouth daily 90 tablet 0     nitroGLYcerin (NITROSTAT) 0.4 MG sublingual tablet One tablet under the tongue every 5 minutes if needed for chest pain. May repeat every 5 minutes for a maximum of 3 doses in 15 minutes\" (Patient not taking: Reported on 3/16/2022) 25 tablet 3    No Known Allergies      Lab Results    Chemistry/lipid CBC Cardiac Enzymes/BNP/TSH/INR   Lab Results   Component Value Date    CHOL 113 01/18/2022    HDL 43 01/18/2022    TRIG 117 01/18/2022    BUN 13 03/16/2022     03/16/2022    CO2 29 03/16/2022    Lab Results   Component Value Date    WBC 6.5 02/25/2022    HGB 15.3 02/25/2022    HCT 42.7 02/25/2022    MCV 91 02/25/2022     02/25/2022    Lab Results   Component Value Date    TSH 1.52 05/04/2018        36  minutes spent on the date of encounter doing chart review, review of test results, interpretation with above tests, patient visit and documentation.        This note has been dictated using voice recognition software. Any grammatical, typographical, or context distortions are unintentional and inherent to the software      "

## 2022-03-16 NOTE — PATIENT INSTRUCTIONS
Venancio Tolentino,    It was a pleasure to see you today at the [facility].     My recommendations after this visit include:    - No medications changes made today    -We will follow-up with you once your lab results back with further recommendation    - Follow up with Jackie in 2 to 3 weeks    - Follow up with Dr. Wilburn as scheduled on May 2    -Monitor your weight daily, keep sodium intake less than 2000 mg/day, and regular exercise as tolerated    - Please call [facility] on [phone]  if you have any questions or concerns    Jackie Platt CNP    What Is Heart Failure?  The heart is a muscle. It pumps oxygen-rich blood to all parts of the body. When you have heart failure, the heart can t pump as well as it should. Blood and fluid may back up into the lungs, and some parts of the body don t get enough oxygen-rich blood to work normally. These problems lead to the symptoms you feel.    When You Have Heart Failure  Because of heart failure, not enough blood leaves the heart with each beat. There are two types of heart failure. Both affect the ventricles  ability to pump blood. You may have one or both types.     Systolic heart failure: The heart muscle becomes weak and enlarged. It can t pump enough blood forward when the ventricles contract. Ejection fraction is lower than normal.   Diastolic heart failure: The heart muscle becomes stiff. It doesn t relax normally between contractions, which keeps the ventricles from filling with blood. Ejection fraction is often in the normal range.     How Heart Failure Affects Your Body  When the heart doesn t pump enough blood, hormones (body chemicals) are sent to increase the amount of work the heart does. Some hormones make the heart grow larger. Others tell the heart to pump faster. As a result, the heart may pump more blood at first, but it can t keep up with the ongoing demands. So, the heart muscle becomes more damaged. Over time, even less  blood is pumped through the heart. This leads to problems throughout the body.    What Is Ejection Fraction?  Ejection fraction (EF) measures how much blood the heart pumps out (ejects). This is measured to help diagnose heart failure. A healthy heart pumps at least half of the blood from the ventricles with each beat. This means a normal ejection fraction is around 50% or more.       [id number] [employer]. [address] [place] [address], [place] [address]. All rights reserved. This information is not intended as a substitute for professional medical care. Always follow your healthcare professional's instructions.

## 2022-03-16 NOTE — LETTER
3/16/2022    Marlen Daniels, NP  [address]  [facility] [address]    RE: Venancio Tolentino       Dear Colleague,     I had the pleasure of seeing Venancio Tolentino in the [facility].          Assessment/Recommendations   Assessment:    1.  Acute diastolic heart failure, NYHA class II: Recent coronary angiogram showed elevated LVEDP at 25.  Patient was initiated on furosemide 20 mg daily.    Today, patient is well compensated with no evidence of fluid retention on assessment.  He noticed improvement in his shortness of breath since initiated on furosemide.    Current weight is 153 to 155 pounds at home   He is not following low-sodium diet.  He eats out often.    We discussed and reviewed about heart failure, medication management, and lifestyle management including low sodium diet <2 g/day, daily weight, and staying physically active as tolerated. Patient met with the nurse clinician for heart failure education.    2. Coronary artery disease with previous history of PCI to RCA and LAD: Coronary angiogram on 2/22/2022 showed patent stents in RCA and LAD otherwise mild coronary disease.    On aspirin 160 mg daily.    Denies chest pain.    3  Dyslipidemia with LDL goal <70/ Obesity/ on statin therapy with on rosuvastatin 20 mg daily. . Most recent LDL is in 40s-at goal.  Most recent AST/ALT are stable in January 2022    4.  Hypertension: Blood pressure today is under excellent control.  Blood pressure improved since lisinopril dose was increased to 10 mg daily post coronary angiogram.    5.  Obstructive sleep apnea: History of intolerance to CPAP mask.  We discussed about the correlation between obstructive sleep apnea, coronary disease, hypertension, and diastolic heart failure.    Plan/Recommendation:   -BMP/BNP pending.    -Continue medical therapy for coronary disease  -Continue current hypertension regimen  -Highly encouraged to follow-up with sleep clinic-referral in  place     Follow-up with Dr. Wilburn scheduled on May 2.  Follow-up with me in 2 to 3 weeks     History of Present Illness/Subjective    Mr. Venancio Tolentino is a 61 year old male with a past medical history of coronary artery disease with status post PCI to RCA and LAD in the past, hypertension, hyperlipidemia, former smoker, obstructive sleep apnea, and recent diagnosis of acute ST heart failure who is seen at [facility] for coronary angiogram follow up.    Patient saw Dr. Wilburn in February with concerns about dyspnea on exertion.  He underwent coronary angiogram showed patent stents in RCA and LAD but noted elevated LVEDP.  Patient was initiated on diuretic therapy.  Blood pressure was found elevated.  Lisinopril dose was increased.    Most recent echocardiogram done in January 2021 showed preserved LVEF with no significant valve abnormalities noted.    Today, Yifan reports feeling improvement in his shortness of breath since on diuretic therapy.    He denies fatigue, lightheadedness, shortness of breath, orthopnea, PND, palpitations, chest pain, abdominal fullness/bloating and lower extremity edema.  He does not follow low-sodium diet.  He eats out often.  He has not follow-up with sleep clinic yet.    Coronary Angiogram done on 2/25/2022: reviewed:  Conclusion    Dyspnea on exertion and some chest tightness in a man with previous coronary PCI. Stress echo suggests distal ischemia.    Grossly normal left main.    Widely patent proximal and distal LAD stents with minimal atherosclerosis otherwise.    Minimal LCx disease.    Mid RCA stents are widely patent and there is minimal atherosclerosis otherwise.    LV EDP 25 mmHg. LV-Ao gradient 12 mmHg. Echo 1 year ago did not suggest aortic valve disease or LVOT issues.    ECHO Milady January 2021-Reviewed:   Narrative & Impression    Left ventricle ejection fraction is normal. The calculated left ventricular ejection fraction is  "56%.    Normal right ventricular size and systolic function.    Mild mitral and tricuspid insufficiency. No evidence of pulmonary hypertension.    When compared to the previous study dated 11/2/2015, no significant interval change.     Physical Examination Review of Systems   /70 (BP Location: Left arm, Patient Position: Sitting, Cuff Size: Adult Regular)   Pulse 64   Resp 12   Ht 1.651 m (5' 5\")   Wt 71.2 kg (156 lb 14.4 oz)   BMI 26.11 kg/m    Body mass index is 26.11 kg/m .  Wt Readings from Last 3 Encounters:   03/16/22 71.2 kg (156 lb 14.4 oz)   02/25/22 70.3 kg (155 lb)   02/01/22 69.9 kg (154 lb)     General Appearance:   no distress, normal body habitus   ENT/Mouth: membranes moist, no oral lesions or bleeding gums.      EYES:  no scleral icterus, normal conjunctivae   Neck: no carotid bruits or thyromegaly   Chest/Lungs:   lungs are clear to auscultation, no rales or wheezing, equal chest wall expansion    Cardiovascular:    Heart rate regular. Normal first and second heart sounds with no murmurs, rubs, or gallops; the carotid, radial and posterior tibial pulses are intact, Jugular venous pressure flat with no edema bilaterally    Abdomen:  no organomegaly, masses, bruits, or tenderness; bowel sounds are present   Extremities   no cyanosis or clubbing   Radial pulses and Pedal pulses intact and symmetrical.  CMS intact.   Skin: no xanthelasma, warm.    Neurologic: normal  bilateral, no tremors   Psychiatric: alert and oriented x3, calm           Negative unless noted in HPI     Medical History  Surgical History Family History Social History   Past Medical History:   Diagnosis Date     Tobacco use disorder 9/8/2006    Past Surgical History:   Procedure Laterality Date     CARDIAC CATHETERIZATION  2014     CARDIAC CATHETERIZATION  2015     CARDIAC CATHETERIZATION  05/04/2018     CORONARY STENT PLACEMENT      rca and lad     CV CORONARY ANGIOGRAM N/A 5/4/2018    Procedure: Coronary Angiogram;  " Surgeon: Frank Wilburn MD;  Location: [facility] Lab;  Service:      CV CORONARY ANGIOGRAM N/A 2/25/2022    Procedure: Coronary Angiogram;  Surgeon: Nona George MD;  Location: [facility] CATH LAB CV     CV LEFT HEART CATH N/A 2/25/2022    Procedure: Left Heart Cath;  Surgeon: Nona George MD;  Location: [facility] CATH LAB CV     CV LEFT HEART CATHETERIZATION WITH LEFT VENTRICULOGRAM N/A 5/4/2018    Procedure: Left Heart Catheterization with Left Ventriculogram;  Surgeon: Frank Wilburn MD;  Location: [facility] Cath Lab;  Service:      HERNIA REPAIR       SURGICAL HISTORY OF -   1993    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     SURGICAL HISTORY OF -   7/7/2000    Right inguinal hernia with high ligation of indirect hernia sac and placement of Prolene mesh    Family History   Problem Relation Age of Onset     Hypertension Mother      Cerebrovascular Disease Mother      Cardiovascular Mother         Marfan's Syndrome     Respiratory Father         emphesema     Snoring Father      Asthma Sister      Cardiovascular Sister         aortic aneurism Marfan's syndrome.    Social History     Socioeconomic History     Marital status:      Spouse name: Linda     Number of children: 3     Years of education: Not on file     Highest education level: Not on file   Occupational History     Not on file   Tobacco Use     Smoking status: Former Smoker     Packs/day: 1.00     Years: 25.00     Pack years: 25.00     Types: Cigarettes     Quit date: 2012     Years since quitting: 10.2     Smokeless tobacco: Never Used   Substance and Sexual Activity     Alcohol use: No     Drug use: Never     Sexual activity: Not Currently     Partners: Female   Other Topics Concern     Not on file   Social History Narrative     Not on file     Social Determinants of Health     Financial Resource Strain: Not on file   Food Insecurity: Not on file   Transportation Needs: Not on file   Physical  "Activity: Not on file   Stress: Not on file   Social Connections: Not on file   Intimate Partner Violence: Not on file   Housing Stability: Not on file          Medications  Allergies   Current Outpatient Medications   Medication Sig Dispense Refill     aspirin (ASA) 81 MG chewable tablet Take 2 tablets (162 mg) by mouth daily       coenzyme Q-10 capsule Take 1 capsule by mouth daily       fish oil-omega-3 fatty acids 1000 MG capsule Take 2 g by mouth daily       furosemide (LASIX) 20 MG tablet Take 1 tablet (20 mg) by mouth daily 30 tablet 0     gemfibrozil (LOPID) 600 MG tablet Take 1 tablet (600 mg) by mouth 2 times daily 180 tablet 11     lisinopril (ZESTRIL) 20 MG tablet Take 1 tablet (20 mg) by mouth daily 90 tablet 3     metoprolol succinate ER (TOPROL-XL) 100 MG 24 hr tablet Take 1 tablet (100 mg) by mouth daily 90 tablet 0     rosuvastatin (CRESTOR) 20 MG tablet Take 1 tablet (20 mg) by mouth daily 90 tablet 0     nitroGLYcerin (NITROSTAT) 0.4 MG sublingual tablet One tablet under the tongue every 5 minutes if needed for chest pain. May repeat every 5 minutes for a maximum of 3 doses in 15 minutes\" (Patient not taking: Reported on 3/16/2022) 25 tablet 3    No Known Allergies      Lab Results    Chemistry/lipid CBC Cardiac Enzymes/BNP/TSH/INR   Lab Results   Component Value Date    CHOL 113 01/18/2022    HDL 43 01/18/2022    TRIG 117 01/18/2022    BUN 13 03/16/2022     03/16/2022    CO2 29 03/16/2022    Lab Results   Component Value Date    WBC 6.5 02/25/2022    HGB 15.3 02/25/2022    HCT 42.7 02/25/2022    MCV 91 02/25/2022     02/25/2022    Lab Results   Component Value Date    TSH 1.52 05/04/2018        36  minutes spent on the date of encounter doing chart review, review of test results, interpretation with above tests, patient visit and documentation.        This note has been dictated using voice recognition software. Any grammatical, typographical, or context distortions are unintentional " and inherent to the software        Thank you for allowing me to participate in the care of your patient.    Sincerely,   KATELYN [facility]  cc:   Nona George MD  [address] [place] [id number]

## 2022-04-25 ENCOUNTER — OFFICE VISIT (OUTPATIENT)
Dept: CARDIOLOGY | Facility: CLINIC | Age: 62
End: 2022-04-25
Payer: COMMERCIAL

## 2022-04-25 VITALS
HEIGHT: 65 IN | BODY MASS INDEX: 25.83 KG/M2 | RESPIRATION RATE: 16 BRPM | HEART RATE: 62 BPM | SYSTOLIC BLOOD PRESSURE: 126 MMHG | WEIGHT: 155 LBS | DIASTOLIC BLOOD PRESSURE: 64 MMHG

## 2022-04-25 DIAGNOSIS — G47.33 OSA (OBSTRUCTIVE SLEEP APNEA): ICD-10-CM

## 2022-04-25 DIAGNOSIS — I50.30 HEART FAILURE WITH PRESERVED EJECTION FRACTION, NYHA CLASS II (H): Primary | ICD-10-CM

## 2022-04-25 DIAGNOSIS — I10 ESSENTIAL HYPERTENSION, BENIGN: ICD-10-CM

## 2022-04-25 DIAGNOSIS — E78.5 HYPERLIPIDEMIA, UNSPECIFIED HYPERLIPIDEMIA TYPE: ICD-10-CM

## 2022-04-25 PROCEDURE — 99214 OFFICE O/P EST MOD 30 MIN: CPT | Performed by: INTERNAL MEDICINE

## 2022-04-25 RX ORDER — SPIRONOLACTONE AND HYDROCHLOROTHIAZIDE 25; 25 MG/1; MG/1
1 TABLET ORAL DAILY
Qty: 90 TABLET | Refills: 11 | Status: SHIPPED | OUTPATIENT
Start: 2022-04-25 | End: 2023-04-19

## 2022-04-25 RX ORDER — METOPROLOL SUCCINATE 100 MG/1
100 TABLET, EXTENDED RELEASE ORAL DAILY
Qty: 90 TABLET | Refills: 11 | Status: SHIPPED | OUTPATIENT
Start: 2022-04-25 | End: 2023-05-01

## 2022-04-25 RX ORDER — ROSUVASTATIN CALCIUM 20 MG/1
20 TABLET, COATED ORAL DAILY
Qty: 90 TABLET | Refills: 11 | Status: SHIPPED | OUTPATIENT
Start: 2022-04-25 | End: 2023-05-01

## 2022-04-25 RX ORDER — LISINOPRIL 20 MG/1
20 TABLET ORAL DAILY
Qty: 90 TABLET | Refills: 11 | Status: SHIPPED | OUTPATIENT
Start: 2022-04-25 | End: 2023-05-23

## 2022-04-25 NOTE — PROGRESS NOTES
"  Thank you, Dr. Wilburn, for asking the [facility] team to see Mr. Venancio Tolentino to evaluate       Assessment/Recommendations   Assessment/Plan:  1. HFpEF - we discussed whether ischemia due to muscle bridging the culprit vs diastolic dysfunction or combination - given some rales and mild edema on exam will start with spironolactone/HCTZ daily (chceck BMP in 1 week), cont metoprolol and lisinopril.  Refer back for tx for ANISA  2. CAD s/p PCI - noted muscle bridging, stents open, ischemia on stress imaging, if diuretics do not help with dyspnea, consider addition of amlodipine, CABG/unroofing of LAD is possible but not certain needed at this time.  3. CV prevention on rosutvastatin/lopid    Follow up 3 mo     History of Present Illness/Subjective    Mr. Venancio Tolentino is a 61 year old male with hx fo PDI to RCA and LAD, lipids at target, caht in feb 2022 due to ches tpain and abn stress test with no severe obstruction, still has chest pain left side sometimes at rest, someteims with exertion but not always.  He is limited by dyspnea on long walks, noted elevated LVEDP , was started furosemide for 30 days and stopped, raised lisinopirl 20mg.  His breathing is better today compared to January but still with limitations.  Reviewed angiogram and notedmuscle bridging in mid LAD just before diagona/distal LAD stent.         Physical Examination Review of Systems   /64 (BP Location: Right arm, Patient Position: Sitting, Cuff Size: Adult Regular)   Pulse 62   Resp 16   Ht 1.651 m (5' 5\")   Wt 70.3 kg (155 lb)   BMI 25.79 kg/m    Body mass index is 25.79 kg/m .  Wt Readings from Last 3 Encounters:   04/25/22 70.3 kg (155 lb)   03/16/22 71.2 kg (156 lb 14.4 oz)   02/25/22 70.3 kg (155 lb)     [unfilled]  General Appearance:   no distress, normal body habitus   ENT/Mouth: membranes moist, no oral lesions or bleeding gums.      EYES:  no scleral icterus, normal conjunctivae   Neck: no carotid " bruits or thyromegaly   Chest/Lungs:   lungs are clear to auscultation, no rales or wheezing,  sternal scar, equal chest wall expansion    Cardiovascular:   Regular. Normal first and second heart sounds with no murmurs, rubs, or gallops; the carotid, radial and posterior tibial pulses are intact, Jugular venous pressure , edema bilaterally    Abdomen:  no organomegaly, masses, bruits, or tenderness; bowel sounds are present   Extremities: no cyanosis or clubbing   Skin: no xanthelasma, warm.    Neurologic: normal  bilateral, no tremors     Psychiatric: alert and oriented x3, calm     Review of Systems - 12 points nega other than above      Medical History  Surgical History Family History Social History   Past Medical History:   Diagnosis Date     Tobacco use disorder 9/8/2006    Past Surgical History:   Procedure Laterality Date     CARDIAC CATHETERIZATION  2014     CARDIAC CATHETERIZATION  2015     CARDIAC CATHETERIZATION  05/04/2018     CORONARY STENT PLACEMENT      rca and lad     CV CORONARY ANGIOGRAM N/A 5/4/2018    Procedure: Coronary Angiogram;  Surgeon: Frank Wilburn MD;  Location: [facility] Cath Lab;  Service:      CV CORONARY ANGIOGRAM N/A 2/25/2022    Procedure: Coronary Angiogram;  Surgeon: Nona George MD;  Location: [facility] CATH LAB CV     CV LEFT HEART CATH N/A 2/25/2022    Procedure: Left Heart Cath;  Surgeon: Nona George MD;  Location: [facility] CATH LAB CV     CV LEFT HEART CATHETERIZATION WITH LEFT VENTRICULOGRAM N/A 5/4/2018    Procedure: Left Heart Catheterization with Left Ventriculogram;  Surgeon: Frank Wilburn MD;  Location: [facility] Cath Lab;  Service:      HERNIA REPAIR       SURGICAL HISTORY OF -   1993    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     SURGICAL HISTORY OF -   7/7/2000    Right inguinal hernia with high ligation of indirect hernia sac and placement of Prolene mesh    Family History   Problem Relation Age of Onset      Hypertension Mother      Cerebrovascular Disease Mother      Cardiovascular Mother         Marfan's Syndrome     Respiratory Father         emphesema     Snoring Father      Asthma Sister      Cardiovascular Sister         aortic aneurism Marfan's syndrome.    Social History     Socioeconomic History     Marital status:      Spouse name: Linda     Number of children: 3     Years of education: Not on file     Highest education level: Not on file   Occupational History     Not on file   Tobacco Use     Smoking status: Former Smoker     Packs/day: 1.00     Years: 25.00     Pack years: 25.00     Types: Cigarettes     Quit date: 2012     Years since quitting: 10.3     Smokeless tobacco: Never Used   Substance and Sexual Activity     Alcohol use: No     Drug use: Never     Sexual activity: Not Currently     Partners: Female   Other Topics Concern     Not on file   Social History Narrative     Not on file     Social Determinants of Health     Financial Resource Strain: Not on file   Food Insecurity: Not on file   Transportation Needs: Not on file   Physical Activity: Not on file   Stress: Not on file   Social Connections: Not on file   Intimate Partner Violence: Not on file   Housing Stability: Not on file          Medications  Allergies   Scheduled Meds:  Continuous Infusions:  PRN Meds:. No Known Allergies      Lab Results    Chemistry/lipid CBC Cardiac Enzymes/BNP/TSH/INR   Lab Results   Component Value Date    CHOL 113 01/18/2022    HDL 43 01/18/2022    TRIG 117 01/18/2022    BUN 13 03/16/2022     03/16/2022    CO2 29 03/16/2022    Lab Results   Component Value Date    WBC 6.5 02/25/2022    HGB 15.3 02/25/2022    HCT 42.7 02/25/2022    MCV 91 02/25/2022     02/25/2022    Lab Results   Component Value Date    BNP 34 03/16/2022    TSH 1.52 05/04/2018              Frank Wilburn MD  Interventional Cardiology  [facility]

## 2022-04-25 NOTE — LETTER
"4/25/2022    Marlen Daniels, NP  [address]  [facility] [address]    RE: Venancio Tolentino       Dear Colleague,     I had the pleasure of seeing Venancio Tolentino in the [facility].    Thank you, Dr. Wilburn, for asking the [facility] team to see Mr. Venancio Tolentino to evaluate       Assessment/Recommendations   Assessment/Plan:  1. HFpEF - we discussed whether ischemia due to muscle bridging the culprit vs diastolic dysfunction or combination - given some rales and mild edema on exam will start with spironolactone/HCTZ daily (chceck BMP in 1 week), cont metoprolol and lisinopril.  Refer back for tx for ANISA  2. CAD s/p PCI - noted muscle bridging, stents open, ischemia on stress imaging, if diuretics do not help with dyspnea, consider addition of amlodipine, CABG/unroofing of LAD is possible but not certain needed at this time.  3. CV prevention on rosutvastatin/lopid    Follow up 3 mo     History of Present Illness/Subjective    Mr. Venancio Tolentino is a 61 year old male with hx fo PDI to RCA and LAD, lipids at target, caht in feb 2022 due to ches tpain and abn stress test with no severe obstruction, still has chest pain left side sometimes at rest, someteims with exertion but not always.  He is limited by dyspnea on long walks, noted elevated LVEDP , was started furosemide for 30 days and stopped, raised lisinopirl 20mg.  His breathing is better today compared to January but still with limitations.  Reviewed angiogram and notedmuscle bridging in mid LAD just before diagona/distal LAD stent.         Physical Examination Review of Systems   /64 (BP Location: Right arm, Patient Position: Sitting, Cuff Size: Adult Regular)   Pulse 62   Resp 16   Ht 1.651 m (5' 5\")   Wt 70.3 kg (155 lb)   BMI 25.79 kg/m    Body mass index is 25.79 kg/m .  Wt Readings from Last 3 Encounters:   04/25/22 70.3 kg (155 lb)   03/16/22 71.2 kg (156 lb 14.4 oz)   02/25/22 " 70.3 kg (155 lb)     [unfilled]  General Appearance:   no distress, normal body habitus   ENT/Mouth: membranes moist, no oral lesions or bleeding gums.      EYES:  no scleral icterus, normal conjunctivae   Neck: no carotid bruits or thyromegaly   Chest/Lungs:   lungs are clear to auscultation, no rales or wheezing,  sternal scar, equal chest wall expansion    Cardiovascular:   Regular. Normal first and second heart sounds with no murmurs, rubs, or gallops; the carotid, radial and posterior tibial pulses are intact, Jugular venous pressure , edema bilaterally    Abdomen:  no organomegaly, masses, bruits, or tenderness; bowel sounds are present   Extremities: no cyanosis or clubbing   Skin: no xanthelasma, warm.    Neurologic: normal  bilateral, no tremors     Psychiatric: alert and oriented x3, calm     Review of Systems - 12 points nega other than above      Medical History  Surgical History Family History Social History   Past Medical History:   Diagnosis Date     Tobacco use disorder 9/8/2006    Past Surgical History:   Procedure Laterality Date     CARDIAC CATHETERIZATION  2014     CARDIAC CATHETERIZATION  2015     CARDIAC CATHETERIZATION  05/04/2018     CORONARY STENT PLACEMENT      rca and lad     CV CORONARY ANGIOGRAM N/A 5/4/2018    Procedure: Coronary Angiogram;  Surgeon: Frank Wilburn MD;  Location: [facility] Cath Lab;  Service:      CV CORONARY ANGIOGRAM N/A 2/25/2022    Procedure: Coronary Angiogram;  Surgeon: Nona George MD;  Location: [facility] CATH LAB CV     CV LEFT HEART CATH N/A 2/25/2022    Procedure: Left Heart Cath;  Surgeon: Nona George MD;  Location: [facility] CV     CV LEFT HEART CATHETERIZATION WITH LEFT VENTRICULOGRAM N/A 5/4/2018    Procedure: Left Heart Catheterization with Left Ventriculogram;  Surgeon: Frank Wilburn MD;  Location: [facility] Cath Lab;  Service:      HERNIA REPAIR       SURGICAL HISTORY OF -   1993    Traumatic amputation of the  left 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     SURGICAL HISTORY OF -   7/7/2000    Right inguinal hernia with high ligation of indirect hernia sac and placement of Prolene mesh    Family History   Problem Relation Age of Onset     Hypertension Mother      Cerebrovascular Disease Mother      Cardiovascular Mother         Marfan's Syndrome     Respiratory Father         emphesema     Snoring Father      Asthma Sister      Cardiovascular Sister         aortic aneurism Marfan's syndrome.    Social History     Socioeconomic History     Marital status:      Spouse name: Linda     Number of children: 3     Years of education: Not on file     Highest education level: Not on file   Occupational History     Not on file   Tobacco Use     Smoking status: Former Smoker     Packs/day: 1.00     Years: 25.00     Pack years: 25.00     Types: Cigarettes     Quit date: 2012     Years since quitting: 10.3     Smokeless tobacco: Never Used   Substance and Sexual Activity     Alcohol use: No     Drug use: Never     Sexual activity: Not Currently     Partners: Female   Other Topics Concern     Not on file   Social History Narrative     Not on file     Social Determinants of Health     Financial Resource Strain: Not on file   Food Insecurity: Not on file   Transportation Needs: Not on file   Physical Activity: Not on file   Stress: Not on file   Social Connections: Not on file   Intimate Partner Violence: Not on file   Housing Stability: Not on file          Medications  Allergies   Scheduled Meds:  Continuous Infusions:  PRN Meds:. No Known Allergies      Lab Results    Chemistry/lipid CBC Cardiac Enzymes/BNP/TSH/INR   Lab Results   Component Value Date    CHOL 113 01/18/2022    HDL 43 01/18/2022    TRIG 117 01/18/2022    BUN 13 03/16/2022     03/16/2022    CO2 29 03/16/2022    Lab Results   Component Value Date    WBC 6.5 02/25/2022    HGB 15.3 02/25/2022    HCT 42.7 02/25/2022    MCV 91 02/25/2022     02/25/2022    Lab  Results   Component Value Date    BNP 34 03/16/2022    TSH 1.52 05/04/2018              Frank Wilburn MD  Interventional Cardiology  [facility]    Thank you for allowing me to participate in the care of your patient.      Sincerely,     Frank Wilburn MD     [facility]  cc:   Frank Wilburn MD  [address] [place] [address]

## 2022-06-06 ENCOUNTER — OFFICE VISIT (OUTPATIENT)
Dept: FAMILY MEDICINE | Facility: CLINIC | Age: 62
End: 2022-06-06
Payer: COMMERCIAL

## 2022-06-06 ENCOUNTER — NURSE TRIAGE (OUTPATIENT)
Dept: NURSING | Facility: CLINIC | Age: 62
End: 2022-06-06
Payer: COMMERCIAL

## 2022-06-06 VITALS
RESPIRATION RATE: 16 BRPM | DIASTOLIC BLOOD PRESSURE: 61 MMHG | OXYGEN SATURATION: 100 % | HEART RATE: 67 BPM | TEMPERATURE: 98.1 F | WEIGHT: 151 LBS | SYSTOLIC BLOOD PRESSURE: 94 MMHG | BODY MASS INDEX: 25.13 KG/M2

## 2022-06-06 DIAGNOSIS — R12 HEART BURN: Primary | ICD-10-CM

## 2022-06-06 DIAGNOSIS — R19.7 DIARRHEA, UNSPECIFIED TYPE: ICD-10-CM

## 2022-06-06 LAB
ATRIAL RATE - MUSE: 70 BPM
DIASTOLIC BLOOD PRESSURE - MUSE: NORMAL MMHG
INTERPRETATION ECG - MUSE: NORMAL
P AXIS - MUSE: 75 DEGREES
PR INTERVAL - MUSE: 186 MS
QRS DURATION - MUSE: 76 MS
QT - MUSE: 384 MS
QTC - MUSE: 414 MS
R AXIS - MUSE: -6 DEGREES
SYSTOLIC BLOOD PRESSURE - MUSE: NORMAL MMHG
T AXIS - MUSE: 79 DEGREES
VENTRICULAR RATE- MUSE: 70 BPM

## 2022-06-06 PROCEDURE — U0005 INFEC AGEN DETEC AMPLI PROBE: HCPCS | Performed by: PHYSICIAN ASSISTANT

## 2022-06-06 PROCEDURE — 93010 ELECTROCARDIOGRAM REPORT: CPT | Performed by: INTERNAL MEDICINE

## 2022-06-06 PROCEDURE — 99213 OFFICE O/P EST LOW 20 MIN: CPT | Mod: CS | Performed by: PHYSICIAN ASSISTANT

## 2022-06-06 PROCEDURE — 93005 ELECTROCARDIOGRAM TRACING: CPT | Performed by: PHYSICIAN ASSISTANT

## 2022-06-06 PROCEDURE — U0003 INFECTIOUS AGENT DETECTION BY NUCLEIC ACID (DNA OR RNA); SEVERE ACUTE RESPIRATORY SYNDROME CORONAVIRUS 2 (SARS-COV-2) (CORONAVIRUS DISEASE [COVID-19]), AMPLIFIED PROBE TECHNIQUE, MAKING USE OF HIGH THROUGHPUT TECHNOLOGIES AS DESCRIBED BY CMS-2020-01-R: HCPCS | Performed by: PHYSICIAN ASSISTANT

## 2022-06-06 RX ORDER — CALCIUM CARBONATE 500 MG/1
2 TABLET, CHEWABLE ORAL 3 TIMES DAILY PRN
Qty: 100 TABLET | Refills: 0 | Status: SHIPPED | OUTPATIENT
Start: 2022-06-06 | End: 2022-11-09

## 2022-06-06 RX ORDER — OMEPRAZOLE 20 MG/1
20 TABLET, DELAYED RELEASE ORAL DAILY
Qty: 21 TABLET | Refills: 0 | Status: SHIPPED | OUTPATIENT
Start: 2022-06-06 | End: 2022-06-27

## 2022-06-06 ASSESSMENT — ENCOUNTER SYMPTOMS
CHILLS: 0
VOMITING: 0
SHORTNESS OF BREATH: 1
BLOOD IN STOOL: 0
WHEEZING: 0
DIARRHEA: 1
COUGH: 0
DIZZINESS: 0
NAUSEA: 1
FEVER: 0
SORE THROAT: 0
ABDOMINAL PAIN: 1
LIGHT-HEADEDNESS: 0
VOICE CHANGE: 0

## 2022-06-06 NOTE — PROGRESS NOTES
"Patient presents with:  Diarrhea: Nausea diarrhea and SOB for about a good week or so, having stomach issues, when swallowing feels stuck and feels like heartburn, standing up sometimes gets lightheaded.       Clinical Decision Making: Patient experiencing heartburn and globus sensation along with occasional shortness of breath not specifically associated with activity.  He denies any chest pain other than heartburn sensation.  He is also been experiencing some diarrhea.  No risk factors for C. difficile.  Recommend starting omeprazole and Tums for symptomatic relief of heartburn sensation.  EKG does not show any ischemic changes or arrhythmias.  COVID test gather due to diarrhea, in process at this time.  Patient was instructed in supportive cares for diarrhea.      ICD-10-CM    1. Heart burn  R12 EKG 12-lead, tracing only     omeprazole (PRILOSEC OTC) 20 MG EC tablet     calcium carbonate (TUMS) 500 MG chewable tablet   2. Diarrhea, unspecified type  R19.7 Symptomatic; Unknown COVID-19 Virus (Coronavirus) by PCR Nose       Patient Instructions   1. Begin taking Omeprazole according to bottle instructions.  This medication should be taken about 30 minutes prior to your first meal of the day.  2. You may use Tums on an as-needed basis, sometimes it can be helpful to predose your meals with Tums.  3. Avoid lots of carbonated beverages as these can often aggravate heartburn.  4. Seek emergency medical attention if you develop worsening shortness of breath, chest pain, leg swelling, or lightheadedness.    Diarrhea care:  2) Limit dairy products for 5-7 days.  3) Continue with a bland foods. BRAT diet is recommended which stands for: bananas, rice, applesauce, toast. Avoid spicy or greasy foods. Fruits that start with the letter \"P\" generally worsen diarrhea symptoms.   4) This diarrhea is most likely viral. Viral diarrhea can last up to 10-14 days, but typically the first 72 hours are the most severe. Follow up right " away if you ever have blood in your stool.   5) Seek emergency medical attention if you  have concerns for sever dehydration. Symptoms of dehydration include severe fatigue, lightheadedness, dark colored urine, and very dry mouth. Dehydration can occur if you can't tolerate drinking fluids or if you're vomiting in addition to having diarrhea. There are some urgent cares that have IV fluids, so if you become dehydrated those are also an option.        HPI:  Venancio Tolentino is a 61 year old male with past medical history of coronary artery disease, hypertension, cardiomyopathy, hyperlipidemia, tobacco use disorder and 25-pack-year history who presents today complaining of nausea, diarrhea, and shortness of breath for the past 1 week. His diarrhea has been going on for about 3-4 days. He had about 4-5 episodes of diarrhea yesterday, but sometimes there is nothing that comes out. They are runny stools. Patient is experiencing heartburn-like sensation and sensation of something stuck in his throat. Sxs happen when he's eating or drinking. He's not on anything for acid production. At times he feels out of breath, but denies any abnormal lung sounds. He reports bilateral calf pain that started today.     History obtained from the patient.    Problem List:  2022-03: ANISA (obstructive sleep apnea)  2022-01: Coronary artery disease involving native coronary artery of   native heart without angina pectoris  2022-01: Hyperlipidemia, unspecified hyperlipidemia type  2014-12: Cardiomyopathy, ischemic  2006-09: Essential hypertension, benign  2006-09: Tobacco use disorder      Past Medical History:   Diagnosis Date     Tobacco use disorder 9/8/2006       Social History     Tobacco Use     Smoking status: Former Smoker     Packs/day: 1.00     Years: 25.00     Pack years: 25.00     Types: Cigarettes     Quit date: 2012     Years since quitting: 10.4     Smokeless tobacco: Never Used   Substance Use Topics     Alcohol use: No        Review of Systems   Constitutional: Negative for chills and fever.   HENT: Negative for sore throat and voice change.    Respiratory: Positive for shortness of breath. Negative for cough and wheezing.    Gastrointestinal: Positive for abdominal pain, diarrhea and nausea. Negative for blood in stool and vomiting.   Neurological: Negative for dizziness and light-headedness.       Vitals:    06/06/22 1556   BP: 94/61   Pulse: 67   Resp: 16   Temp: 98.1  F (36.7  C)   TempSrc: Oral   SpO2: 100%   Weight: 68.5 kg (151 lb)       Physical Exam  Vitals and nursing note reviewed.   Constitutional:       General: He is not in acute distress.     Appearance: He is not toxic-appearing or diaphoretic.   HENT:      Head: Normocephalic and atraumatic.      Right Ear: External ear normal.      Left Ear: External ear normal.   Eyes:      Conjunctiva/sclera: Conjunctivae normal.   Cardiovascular:      Rate and Rhythm: Normal rate and regular rhythm.      Heart sounds: No murmur heard.  Pulmonary:      Effort: Pulmonary effort is normal. No respiratory distress.   Abdominal:      General: Abdomen is flat. Bowel sounds are normal. There is no distension.      Palpations: Abdomen is soft. There is no mass.      Tenderness: There is no abdominal tenderness. There is no guarding or rebound.      Hernia: No hernia is present.   Neurological:      Mental Status: He is alert.   Psychiatric:         Mood and Affect: Mood normal.         Behavior: Behavior normal.         Thought Content: Thought content normal.         Judgment: Judgment normal.         Results:  Results for orders placed or performed in visit on 06/06/22   EKG 12-lead, tracing only     Status: None   Result Value Ref Range    Systolic Blood Pressure  mmHg    Diastolic Blood Pressure  mmHg    Ventricular Rate 70 BPM    Atrial Rate 70 BPM    WV Interval 186 ms    QRS Duration 76 ms     ms    QTc 414 ms    P Axis 75 degrees    R AXIS -6 degrees    T Axis 79 degrees     Interpretation ECG       Sinus rhythm  Borderline Left axis deviation  Normal ECG  When compared with ECG of 25-FEB-2022 08:19,  No significant change was found  Confirmed by ERIC HENAO, LES LOC:JN ([id number]) on 6/6/2022 4:56:27 PM           At the end of the encounter, I discussed results, diagnosis, medications. Discussed red flags for immediate return to clinic/ER, as well as indications for follow up if no improvement. Patient understood and agreed to plan. Patient was stable for discharge.

## 2022-06-06 NOTE — TELEPHONE ENCOUNTER
"Patient calling reporting \"diarrhea\" starting last week \"middle of week.\"     Intermittent loose frequent stools. Reporting 2 \"slightly formed smaller stools today.\"    Patient reporting 4 loose stools yesterday. Stating he has been taking Pepto Bismol/stools are dark in color.    Denies abdominal pain.     Afebrile.    Patient is taking fluids, decreased solid intake.    Voiding in past hour urine \"more yellow.\" Mouth is moist.    Reporting he is noticing some dizziness when going from sitting to standing. Dizziness resolves after ambulating.     Disposition to see in clinic today. No available appointments per Central Scheduling. Patient will use Walk In Care.    Kelsie Silva RN  [facility]        Reason for Disposition    MODERATE diarrhea (e.g., 4-6 times / day more than normal) and present > 48 hours (2 days)    Additional Information    Negative: Shock suspected (e.g., cold/pale/clammy skin, too weak to stand, low BP, rapid pulse)    Negative: Difficult to awaken or acting confused (e.g., disoriented, slurred speech)    Negative: Sounds like a life-threatening emergency to the triager    Negative: Vomiting also present and worse than the diarrhea    Negative: Blood in stool and without diarrhea    Negative: SEVERE abdominal pain (e.g., excruciating) and present > 1 hour    Negative: SEVERE abdominal pain and age > 60    Negative: Bloody, black, or tarry bowel movements (Exception: chronic-unchanged black-grey bowel movements and is taking iron pills or Pepto-bismol)    Negative: SEVERE diarrhea (e.g., 7 or more times / day more than normal) and age > 60 years    Negative: Constant abdominal pain lasting > 2 hours    Negative: Drinking very little and has signs of dehydration (e.g., no urine > 12 hours, very dry mouth, very lightheaded)    Negative: Patient sounds very sick or weak to the triager    Negative: SEVERE diarrhea (e.g., 7 or more times / day more than normal) and present > 24 hours (1 " day)    Protocols used: DIARRHEA-A-OH

## 2022-06-06 NOTE — PATIENT INSTRUCTIONS
"Begin taking Omeprazole according to bottle instructions.  This medication should be taken about 30 minutes prior to your first meal of the day.  You may use Tums on an as-needed basis, sometimes it can be helpful to predose your meals with Tums.  Avoid lots of carbonated beverages as these can often aggravate heartburn.  Seek emergency medical attention if you develop worsening shortness of breath, chest pain, leg swelling, or lightheadedness.    Diarrhea care:  2) Limit dairy products for 5-7 days.  3) Continue with a bland foods. BRAT diet is recommended which stands for: bananas, rice, applesauce, toast. Avoid spicy or greasy foods. Fruits that start with the letter \"P\" generally worsen diarrhea symptoms.   4) This diarrhea is most likely viral. Viral diarrhea can last up to 10-14 days, but typically the first 72 hours are the most severe. Follow up right away if you ever have blood in your stool.   5) Seek emergency medical attention if you  have concerns for sever dehydration. Symptoms of dehydration include severe fatigue, lightheadedness, dark colored urine, and very dry mouth. Dehydration can occur if you can't tolerate drinking fluids or if you're vomiting in addition to having diarrhea. There are some urgent cares that have IV fluids, so if you become dehydrated those are also an option.    "

## 2022-06-07 PROBLEM — I50.30 NYHA CLASS 3 HEART FAILURE WITH PRESERVED EJECTION FRACTION (H): Status: ACTIVE | Noted: 2022-06-07

## 2022-06-07 LAB — SARS-COV-2 RNA RESP QL NAA+PROBE: NEGATIVE

## 2022-07-17 ENCOUNTER — HEALTH MAINTENANCE LETTER (OUTPATIENT)
Age: 62
End: 2022-07-17

## 2022-07-25 ENCOUNTER — OFFICE VISIT (OUTPATIENT)
Dept: CARDIOLOGY | Facility: CLINIC | Age: 62
End: 2022-07-25
Attending: INTERNAL MEDICINE
Payer: COMMERCIAL

## 2022-07-25 VITALS
RESPIRATION RATE: 16 BRPM | SYSTOLIC BLOOD PRESSURE: 104 MMHG | BODY MASS INDEX: 25.66 KG/M2 | DIASTOLIC BLOOD PRESSURE: 60 MMHG | HEART RATE: 68 BPM | WEIGHT: 154 LBS | HEIGHT: 65 IN

## 2022-07-25 DIAGNOSIS — E78.5 HYPERLIPIDEMIA LDL GOAL <70: ICD-10-CM

## 2022-07-25 DIAGNOSIS — G47.33 OSA (OBSTRUCTIVE SLEEP APNEA): Primary | ICD-10-CM

## 2022-07-25 DIAGNOSIS — I50.30 HEART FAILURE WITH PRESERVED EJECTION FRACTION, NYHA CLASS II (H): ICD-10-CM

## 2022-07-25 LAB
ANION GAP SERPL CALCULATED.3IONS-SCNC: 13 MMOL/L (ref 5–18)
BUN SERPL-MCNC: 35 MG/DL (ref 8–22)
CALCIUM SERPL-MCNC: 9.6 MG/DL (ref 8.5–10.5)
CHLORIDE BLD-SCNC: 102 MMOL/L (ref 98–107)
CO2 SERPL-SCNC: 21 MMOL/L (ref 22–31)
CREAT SERPL-MCNC: 1.47 MG/DL (ref 0.7–1.3)
GFR SERPL CREATININE-BSD FRML MDRD: 54 ML/MIN/1.73M2
GLUCOSE BLD-MCNC: 88 MG/DL (ref 70–125)
POTASSIUM BLD-SCNC: 4.6 MMOL/L (ref 3.5–5)
SODIUM SERPL-SCNC: 136 MMOL/L (ref 136–145)

## 2022-07-25 PROCEDURE — 36415 COLL VENOUS BLD VENIPUNCTURE: CPT | Performed by: INTERNAL MEDICINE

## 2022-07-25 PROCEDURE — 99214 OFFICE O/P EST MOD 30 MIN: CPT | Performed by: INTERNAL MEDICINE

## 2022-07-25 PROCEDURE — 80048 BASIC METABOLIC PNL TOTAL CA: CPT | Performed by: INTERNAL MEDICINE

## 2022-07-25 RX ORDER — SPIRONOLACTONE 25 MG/1
12.5 TABLET ORAL DAILY
Qty: 45 TABLET | Refills: 11 | Status: SHIPPED | OUTPATIENT
Start: 2022-07-25 | End: 2023-10-17

## 2022-07-25 NOTE — PATIENT INSTRUCTIONS
Stop spironolactone/hydrochlorothiazide   Start sprionolactone 12.5mg daily (half a tablet)    Schedule visit with sleep study team     Obtain fasting lipids in next 1- 2months    Trial imdur to see if spasm is source of HFpEF as well as ischemia in diagonal branch of heart vessels    24 hour holter to check for rhythm problems

## 2022-07-25 NOTE — PROGRESS NOTES
"  Thank you, Dr. Wilburn, for asking the [facility] Heart Care team to see Mr. Venancio Tolentino to evaluate       Assessment/Recommendations   Assessment/Plan:  1. HFpEF - noted ischemia in lateral wall with long very small diagonal (but no change since 2018), muscle bridging in mid LAD (slightly worse compared to 2018), ANISA untreated, no improvement with spironolactone/HCTZ, but light headed, symptoms intermittent with dyspnea which is also his anginal equivalent.  Will lower spironolactone 12.5mg and stop hydrochlorothiazide.  Discuss with Ms. Frances re SGLT2 inhibitor and will start jardiance 10mg, trial 30 days imdur to see if improvement in symptoms, ? Whether HFpEF related to bridging of LAD/diagonal dz or spasm or ANISA or tachyarrythmia.  Check BMP today. Refer to sleep apnea team re CPAP and holter to screen for SVT as culprit   2. CAD s/p PCI  - noted above, goal LDL< 70, repeat lipids in future    Follow up 3 months     History of Present Illness/Subjective    Mr. Venancio Tolentino is a 62 year old male with CAD s/p PCI to RCA/LAD in 2015 with intermittent dyspnea on exertion, such as with shower, present for past 6-8 months, recent stress echo with ischemia in lateral wall, angiograpy with patent stents with possible muscle bridging, he had LVEDP 25mmHg.  He has ANISA but does not function.  Started on spironolactone/HCTZ with lower blood pressure but no change in symptoms.  No dizzy spells, but sometimes lightheaded on standing         Physical Examination Review of Systems   /60 (BP Location: Right arm, Patient Position: Sitting, Cuff Size: Adult Regular)   Pulse 68   Resp 16   Ht 1.651 m (5' 5\")   Wt 69.9 kg (154 lb)   BMI 25.63 kg/m    Body mass index is 25.63 kg/m .  Wt Readings from Last 3 Encounters:   07/25/22 69.9 kg (154 lb)   06/06/22 68.5 kg (151 lb)   04/25/22 70.3 kg (155 lb)     [unfilled]  General Appearance:   no distress, normal body habitus   ENT/Mouth: membranes moist, no " oral lesions or bleeding gums.      EYES:  no scleral icterus, normal conjunctivae   Neck: no carotid bruits or thyromegaly   Chest/Lungs:   lungs are clear to auscultation, no rales or wheezing,  sternal scar, equal chest wall expansion    Cardiovascular:   Regular. Normal first and second heart sounds with no murmurs, rubs, or gallops; the carotid, radial and posterior tibial pulses are intact, Jugular venous pressure , edema bilaterally    Abdomen:  no organomegaly, masses, bruits, or tenderness; bowel sounds are present   Extremities: no cyanosis or clubbing   Skin: no xanthelasma, warm.    Neurologic: normal  bilateral, no tremors     Psychiatric: alert and oriented x3, calm     Review of Systems - 12 points nega other than above      Medical History  Surgical History Family History Social History   Past Medical History:   Diagnosis Date     Tobacco use disorder 9/8/2006    Past Surgical History:   Procedure Laterality Date     CARDIAC CATHETERIZATION  2014     CARDIAC CATHETERIZATION  2015     CARDIAC CATHETERIZATION  05/04/2018     CORONARY STENT PLACEMENT      rca and lad     CV CORONARY ANGIOGRAM N/A 5/4/2018    Procedure: Coronary Angiogram;  Surgeon: Frank Wilburn MD;  Location: [facility] Cath Lab;  Service:      CV CORONARY ANGIOGRAM N/A 2/25/2022    Procedure: Coronary Angiogram;  Surgeon: Nona George MD;  Location: [facility] CATH LAB CV     CV LEFT HEART CATH N/A 2/25/2022    Procedure: Left Heart Cath;  Surgeon: Nona George MD;  Location: [facility] CATH LAB CV     CV LEFT HEART CATHETERIZATION WITH LEFT VENTRICULOGRAM N/A 5/4/2018    Procedure: Left Heart Catheterization with Left Ventriculogram;  Surgeon: Frank Wilburn MD;  Location: [facility] Cath Lab;  Service:      HERNIA REPAIR       SURGICAL HISTORY OF -   1993    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     SURGICAL HISTORY OF -   7/7/2000    Right inguinal hernia with high ligation of  indirect hernia sac and placement of Prolene mesh    Family History   Problem Relation Age of Onset     Hypertension Mother      Cerebrovascular Disease Mother      Cardiovascular Mother         Marfan's Syndrome     Respiratory Father         emphesema     Snoring Father      Asthma Sister      Cardiovascular Sister         aortic aneurism Marfan's syndrome.    Social History     Socioeconomic History     Marital status:      Spouse name: Linda     Number of children: 3     Years of education: Not on file     Highest education level: Not on file   Occupational History     Not on file   Tobacco Use     Smoking status: Former Smoker     Packs/day: 1.00     Years: 25.00     Pack years: 25.00     Types: Cigarettes     Quit date: 2012     Years since quitting: 10.5     Smokeless tobacco: Never Used   Substance and Sexual Activity     Alcohol use: No     Drug use: Never     Sexual activity: Not Currently     Partners: Female   Other Topics Concern     Not on file   Social History Narrative     Not on file     Social Determinants of Health     Financial Resource Strain: Not on file   Food Insecurity: Not on file   Transportation Needs: Not on file   Physical Activity: Not on file   Stress: Not on file   Social Connections: Not on file   Intimate Partner Violence: Not on file   Housing Stability: Not on file          Medications  Allergies   Scheduled Meds:  Continuous Infusions:  PRN Meds:. No Known Allergies      Lab Results    Chemistry/lipid CBC Cardiac Enzymes/BNP/TSH/INR   Lab Results   Component Value Date    CHOL 113 01/18/2022    HDL 43 01/18/2022    TRIG 117 01/18/2022    BUN 13 03/16/2022     03/16/2022    CO2 29 03/16/2022    Lab Results   Component Value Date    WBC 6.5 02/25/2022    HGB 15.3 02/25/2022    HCT 42.7 02/25/2022    MCV 91 02/25/2022     02/25/2022    Lab Results   Component Value Date    BNP 34 03/16/2022    TSH 1.52 05/04/2018              Frank Wilburn,  MD  Interventional Cardiology  [facility]

## 2022-07-25 NOTE — LETTER
"7/25/2022    Marlen Daniels, NP  [address]  [place] [address]    RE: Venancio YOUNG Rajan       Dear Colleague,     I had the pleasure of seeing Venancio Tolentino in the [facility].    Thank you, Dr. Wilburn, for asking the [facility] Heart Care team to see Mr. Venancio Tolentino to evaluate       Assessment/Recommendations   Assessment/Plan:  1. HFpEF - noted ischemia in lateral wall with long very small diagonal (but no change since 2018), muscle bridging in mid LAD (slightly worse compared to 2018), ANISA untreated, no improvement with spironolactone/HCTZ, but light headed, symptoms intermittent with dyspnea which is also his anginal equivalent.  Will lower spironolactone 12.5mg and stop hydrochlorothiazide.  Discuss with Ms. Daniels re SGLT2 inhibitor and will start jardiance 10mg, trial 30 days imdur to see if improvement in symptoms, ? Whether HFpEF related to bridging of LAD/diagonal dz or spasm or ANISA or tachyarrythmia.  Check BMP today. Refer to sleep apnea team re CPAP and holter to screen for SVT as culprit   2. CAD s/p PCI  - noted above, goal LDL< 70, repeat lipids in future    Follow up 3 months     History of Present Illness/Subjective    Mr. Venancio Tolentino is a 62 year old male with CAD s/p PCI to RCA/LAD in 2015 with intermittent dyspnea on exertion, such as with shower, present for past 6-8 months, recent stress echo with ischemia in lateral wall, angiograpy with patent stents with possible muscle bridging, he had LVEDP 25mmHg.  He has ANISA but does not function.  Started on spironolactone/HCTZ with lower blood pressure but no change in symptoms.  No dizzy spells, but sometimes lightheaded on standing         Physical Examination Review of Systems   /60 (BP Location: Right arm, Patient Position: Sitting, Cuff Size: Adult Regular)   Pulse 68   Resp 16   Ht 1.651 m (5' 5\")   Wt 69.9 kg (154 lb)   BMI 25.63 kg/m    Body mass index is 25.63 " kg/m .  Wt Readings from Last 3 Encounters:   07/25/22 69.9 kg (154 lb)   06/06/22 68.5 kg (151 lb)   04/25/22 70.3 kg (155 lb)     [unfilled]  General Appearance:   no distress, normal body habitus   ENT/Mouth: membranes moist, no oral lesions or bleeding gums.      EYES:  no scleral icterus, normal conjunctivae   Neck: no carotid bruits or thyromegaly   Chest/Lungs:   lungs are clear to auscultation, no rales or wheezing,  sternal scar, equal chest wall expansion    Cardiovascular:   Regular. Normal first and second heart sounds with no murmurs, rubs, or gallops; the carotid, radial and posterior tibial pulses are intact, Jugular venous pressure , edema bilaterally    Abdomen:  no organomegaly, masses, bruits, or tenderness; bowel sounds are present   Extremities: no cyanosis or clubbing   Skin: no xanthelasma, warm.    Neurologic: normal  bilateral, no tremors     Psychiatric: alert and oriented x3, calm     Review of Systems - 12 points nega other than above      Medical History  Surgical History Family History Social History   Past Medical History:   Diagnosis Date     Tobacco use disorder 9/8/2006    Past Surgical History:   Procedure Laterality Date     CARDIAC CATHETERIZATION  2014     CARDIAC CATHETERIZATION  2015     CARDIAC CATHETERIZATION  05/04/2018     CORONARY STENT PLACEMENT      rca and lad     CV CORONARY ANGIOGRAM N/A 5/4/2018    Procedure: Coronary Angiogram;  Surgeon: Frank Wilburn MD;  Location: [facility] Cath Lab;  Service:      CV CORONARY ANGIOGRAM N/A 2/25/2022    Procedure: Coronary Angiogram;  Surgeon: Nona George MD;  Location: [facility] CATH [facility] CV     CV LEFT HEART CATH N/A 2/25/2022    Procedure: Left Heart Cath;  Surgeon: Nona George MD;  Location: [facility] CV     CV LEFT HEART CATHETERIZATION WITH LEFT VENTRICULOGRAM N/A 5/4/2018    Procedure: Left Heart Catheterization with Left Ventriculogram;  Surgeon: Frank Wilburn MD;  Location:   [facility] Cath Lab;  Service:      HERNIA REPAIR       SURGICAL HISTORY OF -   1993    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     SURGICAL HISTORY OF -   7/7/2000    Right inguinal hernia with high ligation of indirect hernia sac and placement of Prolene mesh    Family History   Problem Relation Age of Onset     Hypertension Mother      Cerebrovascular Disease Mother      Cardiovascular Mother         Marfan's Syndrome     Respiratory Father         emphesema     Snoring Father      Asthma Sister      Cardiovascular Sister         aortic aneurism Marfan's syndrome.    Social History     Socioeconomic History     Marital status:      Spouse name: Linda     Number of children: 3     Years of education: Not on file     Highest education level: Not on file   Occupational History     Not on file   Tobacco Use     Smoking status: Former Smoker     Packs/day: 1.00     Years: 25.00     Pack years: 25.00     Types: Cigarettes     Quit date: 2012     Years since quitting: 10.5     Smokeless tobacco: Never Used   Substance and Sexual Activity     Alcohol use: No     Drug use: Never     Sexual activity: Not Currently     Partners: Female   Other Topics Concern     Not on file   Social History Narrative     Not on file     Social Determinants of Health     Financial Resource Strain: Not on file   Food Insecurity: Not on file   Transportation Needs: Not on file   Physical Activity: Not on file   Stress: Not on file   Social Connections: Not on file   Intimate Partner Violence: Not on file   Housing Stability: Not on file          Medications  Allergies   Scheduled Meds:  Continuous Infusions:  PRN Meds:. No Known Allergies      Lab Results    Chemistry/lipid CBC Cardiac Enzymes/BNP/TSH/INR   Lab Results   Component Value Date    CHOL 113 01/18/2022    HDL 43 01/18/2022    TRIG 117 01/18/2022    BUN 13 03/16/2022     03/16/2022    CO2 29 03/16/2022    Lab Results   Component Value Date    WBC 6.5  02/25/2022    HGB 15.3 02/25/2022    HCT 42.7 02/25/2022    MCV 91 02/25/2022     02/25/2022    Lab Results   Component Value Date    BNP 34 03/16/2022    TSH 1.52 05/04/2018              Frank Wilburn MD  Interventional Cardiology  [facility]      Thank you for allowing me to participate in the care of your patient.      Sincerely,     Frank Wilburn MD     [facility]  cc:   Frank Wilburn MD  [address] [place] [id number]

## 2022-07-27 ENCOUNTER — TELEPHONE (OUTPATIENT)
Dept: CARDIOLOGY | Facility: CLINIC | Age: 62
End: 2022-07-27

## 2022-07-27 NOTE — TELEPHONE ENCOUNTER
Frank Wilburn MD   7/26/2022  6:10 PM CDT         Given started on jardiance would encourage extra water and repeat BMP next week  thanks

## 2022-07-27 NOTE — TELEPHONE ENCOUNTER
----- Message from Frank Wilburn MD sent at 7/25/2022  4:08 PM CDT -----  Kari  Can you please call him and explain that he would benefit from jardiance 10mg daily - ok per Ms. Daniels, but to check blood test 1-2 weeks after.  If too costly, please let us know  Thanks  Frank  ----- Message -----  From: Marlen Daniels NP  Sent: 7/25/2022   3:28 PM CDT  To: Frank Wilburn MD    Absolutely. That would be wonderful. Thanks for your assistance on this. :)  ----- Message -----  From: Frank Wilburn MD  Sent: 7/25/2022   2:49 PM CDT  To: Marlen Daniels NP    Ms. Frances Saha has HFpEF - would be candidate for SGLT2 inhibitor - it's also a medication for diabetes, ok if we start to help with HF?  Thanks  Frank

## 2022-07-28 ENCOUNTER — HOSPITAL ENCOUNTER (OUTPATIENT)
Dept: CARDIOLOGY | Facility: HOSPITAL | Age: 62
Discharge: HOME | End: 2022-07-28
Attending: INTERNAL MEDICINE | Admitting: INTERNAL MEDICINE
Payer: COMMERCIAL

## 2022-07-28 DIAGNOSIS — I50.30 HEART FAILURE WITH PRESERVED EJECTION FRACTION, NYHA CLASS II (H): ICD-10-CM

## 2022-07-28 PROCEDURE — 93226 XTRNL ECG REC<48 HR SCAN A/R: CPT

## 2022-07-28 NOTE — TELEPHONE ENCOUNTER
Results and recommendations discussed with pt.  Pt verbalized understanding and had no questions.  Pt already has Jardiance, and lab appt scheduled 8/1/22.  [email or web address]

## 2022-08-01 ENCOUNTER — LAB (OUTPATIENT)
Dept: CARDIOLOGY | Facility: CLINIC | Age: 62
End: 2022-08-01
Payer: COMMERCIAL

## 2022-08-01 DIAGNOSIS — E78.5 HYPERLIPIDEMIA LDL GOAL <70: ICD-10-CM

## 2022-08-01 DIAGNOSIS — I50.30 HEART FAILURE WITH PRESERVED EJECTION FRACTION, NYHA CLASS II (H): ICD-10-CM

## 2022-08-01 LAB
ANION GAP SERPL CALCULATED.3IONS-SCNC: 12 MMOL/L (ref 5–18)
BUN SERPL-MCNC: 28 MG/DL (ref 8–22)
CALCIUM SERPL-MCNC: 9.7 MG/DL (ref 8.5–10.5)
CHLORIDE BLD-SCNC: 99 MMOL/L (ref 98–107)
CHOLEST SERPL-MCNC: 135 MG/DL
CO2 SERPL-SCNC: 22 MMOL/L (ref 22–31)
CREAT SERPL-MCNC: 1.28 MG/DL (ref 0.7–1.3)
FASTING STATUS PATIENT QL REPORTED: YES
GFR SERPL CREATININE-BSD FRML MDRD: 63 ML/MIN/1.73M2
GLUCOSE BLD-MCNC: 87 MG/DL (ref 70–125)
HDLC SERPL-MCNC: 44 MG/DL
LDLC SERPL CALC-MCNC: 70 MG/DL
POTASSIUM BLD-SCNC: 4.4 MMOL/L (ref 3.5–5)
SODIUM SERPL-SCNC: 133 MMOL/L (ref 136–145)
TRIGL SERPL-MCNC: 103 MG/DL

## 2022-08-01 PROCEDURE — 80048 BASIC METABOLIC PNL TOTAL CA: CPT

## 2022-08-01 PROCEDURE — 36415 COLL VENOUS BLD VENIPUNCTURE: CPT

## 2022-08-01 PROCEDURE — 80061 LIPID PANEL: CPT

## 2022-08-08 PROCEDURE — 93227 XTRNL ECG REC<48 HR R&I: CPT | Performed by: INTERNAL MEDICINE

## 2022-09-25 ENCOUNTER — HEALTH MAINTENANCE LETTER (OUTPATIENT)
Age: 62
End: 2022-09-25

## 2022-10-19 ASSESSMENT — SLEEP AND FATIGUE QUESTIONNAIRES
HOW LIKELY ARE YOU TO NOD OFF OR FALL ASLEEP WHILE SITTING INACTIVE IN A PUBLIC PLACE: WOULD NEVER DOZE
HOW LIKELY ARE YOU TO NOD OFF OR FALL ASLEEP WHILE SITTING AND READING: MODERATE CHANCE OF DOZING
HOW LIKELY ARE YOU TO NOD OFF OR FALL ASLEEP WHILE SITTING QUIETLY AFTER LUNCH WITHOUT ALCOHOL: MODERATE CHANCE OF DOZING
HOW LIKELY ARE YOU TO NOD OFF OR FALL ASLEEP WHILE WATCHING TV: HIGH CHANCE OF DOZING
HOW LIKELY ARE YOU TO NOD OFF OR FALL ASLEEP WHILE SITTING AND TALKING TO SOMEONE: MODERATE CHANCE OF DOZING
HOW LIKELY ARE YOU TO NOD OFF OR FALL ASLEEP WHILE LYING DOWN TO REST IN THE AFTERNOON WHEN CIRCUMSTANCES PERMIT: MODERATE CHANCE OF DOZING
HOW LIKELY ARE YOU TO NOD OFF OR FALL ASLEEP IN A CAR, WHILE STOPPED FOR A FEW MINUTES IN TRAFFIC: WOULD NEVER DOZE
HOW LIKELY ARE YOU TO NOD OFF OR FALL ASLEEP WHEN YOU ARE A PASSENGER IN A CAR FOR AN HOUR WITHOUT A BREAK: WOULD NEVER DOZE

## 2022-10-20 ENCOUNTER — OFFICE VISIT (OUTPATIENT)
Dept: SLEEP MEDICINE | Facility: CLINIC | Age: 62
End: 2022-10-20
Attending: INTERNAL MEDICINE
Payer: COMMERCIAL

## 2022-10-20 VITALS
HEIGHT: 65 IN | SYSTOLIC BLOOD PRESSURE: 120 MMHG | HEART RATE: 87 BPM | WEIGHT: 159 LBS | BODY MASS INDEX: 26.49 KG/M2 | OXYGEN SATURATION: 94 % | DIASTOLIC BLOOD PRESSURE: 72 MMHG

## 2022-10-20 DIAGNOSIS — G47.33 OSA (OBSTRUCTIVE SLEEP APNEA): Primary | ICD-10-CM

## 2022-10-20 PROCEDURE — 99203 OFFICE O/P NEW LOW 30 MIN: CPT | Performed by: INTERNAL MEDICINE

## 2022-10-20 NOTE — PATIENT INSTRUCTIONS
Your BMI is Body mass index is 26.46 kg/m .    What is BMI?  Body mass index (BMI) is one way to tell whether you are at a healthy weight, overweight, or obese. It measures your weight in relation to your height.  A BMI of 18.5 to 24.9 is in the healthy range. A person with a BMI of 25 to 29.9 is considered overweight, and someone with a BMI of 30 or greater is considered obese.  Another way to find out if you are at risk for health problems caused by overweight and obesity is to measure your waist. If you are a woman and your waist is more than 35 inches, or if you are a man and your waist is more than 40 inches, your risk of disease may be higher.  More than two-thirds of American adults are considered overweight or obese. Being overweight or obese increases the risk for further weight gain.  Excess weight may lead to heart disease and diabetes. Creating and following plans for healthy eating and physical activity may help you improve your health.    Methods for maintaining or losing weight.  Weight control is part of healthy lifestyle and includes exercise, emotional health, and healthy eating habits.  Careful eating habits lifelong is the mainstay of weight control.  Though there are significant health benefits from weight loss, long-term weight loss with diet alone may be very difficult to achieve- studies show long-term success with dietary management in less than 10% of people. Attaining a healthy weight may be especially difficult to achieve in those with severe obesity. In some cases, medications, devices and surgical management might be considered.    What can you do?  If you are overweight or obese and are interested in methods for weight loss, you should discuss this with your provider. In addition, we recommend that you review healthy life styles and methods for weight loss available through the National Institutes of Health patient information sites:    http://win.niddk.nih.gov/publications/index.htm    Equipment Instructions    We will process your PAP order and send it to a Durable Medical Equipment (DME) provider.    The medical equipment company should call you within 7 days.  If you have not heard from the company, please contact them to see if they received your order and are planning to call you.    Please call us at [phone] if you are unable to contact the medical equipment company or if they do not have the order.    If you are starting a new PAP machine, please call us after you use it the first night to let us know how it went. This call also helps us know that you received your equipment and that everything is ready. Please use our central phone number [phone]    Contact information for [employer] company:    [employer] Tel: [phone]    Pressure Desensitization Protocol    1.  Put the mask on your face without putting the straps on while doing an activity that you enjoy such as watching TV, listening to the radio, surfing the Internet, or reading.  Try to do this for 15 minutes a day for one week.    2.  Put the mask on your face with the straps on while doing activity that you enjoy.  Try to do this for 15 minutes a day for another week.    3.  Put the mask on and attach the hose to the machine and turn on the machine.  Try to focus on activities that you enjoy for 15 minutes.  Perform this activity for one week.    4.  The mask on and attach the hose in the machine while doing an activity as you enjoy for 30 minutes.  Try this for one week.    5.  Repeat step 4.  Until you can increase the hours of usage to 2 hours.    May go back to previous steps if there is any discomfort at any time.  Also try to sleep with the machine every night for as long as you can tolerate it.

## 2022-10-20 NOTE — PROGRESS NOTES
Additional 15 minutes on the date of service was spent performing the following:    -Preparing to see the patient  -Obtaining and/or reviewing separately obtained history   -Ordering medications, tests, or procedures   -Documenting clinical information in the electronic or other health record     Thank you for the opportunity to participate in the care of Venancio Tolentino.     He is a 62 year old y/o male patient who comes to the sleep medicine clinic for follow up.  The patient was diagnosed with obstructive sleep apnea at our facility on 01/12/2016 (AHI = 58.6).  The patient admits that since the last visit with us he has not been using his CPAP for approximately 2 years or more.  He states that he is not able to tolerate the current mask.  At the behest of his cardiologist he was strongly advised to return to get back on CPAP therapy.     Assessment and Plan:  In summary Venancio Tolentino is a 62 year old year old male who is here for follow up.    1. ANISA (obstructive sleep apnea)  I will write a prescription for the patient to receive supplies and educated on pressure desensitization protocol.  Return to clinic in 4 months.  - Adult Sleep Eval & Management  Referral  - COMPREHENSIVE DME    Lab reviewed: Discussed with patient.    Sleep-Wake Cycle:    The patient likes to initiate sleep at around 9 PM with a sleep latency of less than 20 minutes. The patient has 1 nocturnal awakenings. Final wake up time is around 4 AM.    STEPHANIE:  STEPHANIE Total Score: 15  Total score - [place]: 11 (10/19/2022  8:36 PM)        Patient Active Problem List   Diagnosis     Essential hypertension, benign     Coronary artery disease involving native coronary artery of native heart without angina pectoris     Hyperlipidemia, unspecified hyperlipidemia type     ANISA (obstructive sleep apnea)     NYHA class 3 heart failure with preserved ejection fraction (H)       Past Medical History:   Diagnosis Date     Tobacco use disorder 9/8/2006        Past Surgical History:   Procedure Laterality Date     CARDIAC CATHETERIZATION  2014     CARDIAC CATHETERIZATION  2015     CARDIAC CATHETERIZATION  05/04/2018     CORONARY STENT PLACEMENT      [facility]     CV CORONARY ANGIOGRAM N/A 5/4/2018    Procedure: Coronary Angiogram;  Surgeon: Frank Wilburn MD;  Location: [facility] Cath Lab;  Service:      CV CORONARY ANGIOGRAM N/A 2/25/2022    Procedure: Coronary Angiogram;  Surgeon: Nona George MD;  Location: [facility] CATH LAB CV     CV LEFT HEART CATH N/A 2/25/2022    Procedure: Left Heart Cath;  Surgeon: Nona George MD;  Location: [facility] CATH LAB CV     CV LEFT HEART CATHETERIZATION WITH LEFT VENTRICULOGRAM N/A 5/4/2018    Procedure: Left Heart Catheterization with Left Ventriculogram;  Surgeon: Frank Wilburn MD;  Location: [facility] Cath Lab;  Service:      HERNIA REPAIR       SURGICAL HISTORY OF -   1993    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     SURGICAL HISTORY OF -   7/7/2000    Right inguinal hernia with high ligation of indirect hernia sac and placement of Prolene mesh       Current Outpatient Medications   Medication Sig Dispense Refill     aspirin (ASA) 81 MG chewable tablet Take 2 tablets (162 mg) by mouth daily       calcium carbonate (TUMS) 500 MG chewable tablet Take 2 tablets (1,000 mg) by mouth 3 times daily as needed for heartburn 100 tablet 0     coenzyme Q-10 capsule Take 1 capsule by mouth daily       empagliflozin (JARDIANCE) 10 MG TABS tablet Take 1 tablet (10 mg) by mouth daily 90 tablet 11     fish oil-omega-3 fatty acids 1000 MG capsule Take 2 g by mouth daily       gemfibrozil (LOPID) 600 MG tablet Take 1 tablet (600 mg) by mouth 2 times daily 180 tablet 11     lisinopril (ZESTRIL) 20 MG tablet Take 1 tablet (20 mg) by mouth daily 90 tablet 11     metoprolol succinate ER (TOPROL-XL) 100 MG 24 hr tablet Take 1 tablet (100 mg) by mouth daily 90 tablet 11     nitroGLYcerin (NITROSTAT)  "0.4 MG sublingual tablet One tablet under the tongue every 5 minutes if needed for chest pain. May repeat every 5 minutes for a maximum of 3 doses in 15 minutes\" 25 tablet 3     rosuvastatin (CRESTOR) 20 MG tablet Take 1 tablet (20 mg) by mouth daily 90 tablet 11     spironolactone (ALDACTONE) 25 MG tablet Take 0.5 tablets (12.5 mg) by mouth daily 45 tablet 11     spironolactone-HCTZ (ALDACTAZIDE) 25-25 MG tablet Take 1 tablet by mouth daily (Patient not taking: Reported on 10/20/2022) 90 tablet 11       No Known Allergies    Physical Exam:  /72   Pulse 87   Ht 1.651 m (5' 5\")   Wt 72.1 kg (159 lb)   SpO2 94%   BMI 26.46 kg/m    BMI:Body mass index is 26.46 kg/m .   GEN: NAD,   Head: Normocephalic.  Psych: normal mood, normal affect    Labs/Studies:      No results found for: PH, PHARTERIAL, PO2, [id number], SAT, PCO2, HCO3, BASEEXCESS, CARISSA, BEB  Lab Results   Component Value Date    TSH 1.52 05/04/2018     Lab Results   Component Value Date    GLC 87 08/01/2022    GLC 88 07/25/2022     Lab Results   Component Value Date    HGB 15.3 02/25/2022    HGB 15.8 10/05/2020     Lab Results   Component Value Date    BUN 28 (H) 08/01/2022    BUN 35 (H) 07/25/2022    CR 1.28 08/01/2022    CR 1.47 (H) 07/25/2022     Lab Results   Component Value Date    AST 23 01/18/2022    ALT 16 01/18/2022    ALKPHOS 73 01/18/2022    BILITOTAL 1.3 (H) 01/18/2022     No results found for: UAMP, UBARB, BENZODIAZEUR, UCANN, UCOC, OPIT, UPCP    Recent Labs   Lab Test 08/01/22  1559 07/25/22  1511   * 136   POTASSIUM 4.4 4.6   CHLORIDE 99 102   CO2 22 21*   ANIONGAP 12 13   GLC 87 88   BUN 28* 35*   CR 1.28 1.47*   DEENA 9.7 9.6       No results found for: ROSMERY    I reviewed the efficacy and compliance report from his device. Data summarized on the HPI and the PAP compliance flow sheet.     Patient verbalized understanding of these issues, agrees with the plan and all questions were answered today. Patient was given an opportuntity " to voice any other symptoms or concerns not listed above. Patient did not have any other symptoms or concerns.      Eric Jimenez DO  Board Certified in Internal Medicine and Sleep Medicine    (Note created with Dragon voice recognition and unintended spelling errors and word substitutions may occur)     Audio and visual devices were used for this virtual clinic visit with permission from patient.

## 2022-10-20 NOTE — NURSING NOTE
Order paced for cpap supplies with [facility] he will also make appointment with [facility] to learn to use machine. Patient will call Jan. 2023 to make appointment.    Jl Shaffer CMA

## 2022-11-08 PROBLEM — I42.8 OTHER PRIMARY CARDIOMYOPATHIES: Status: ACTIVE | Noted: 2022-11-08

## 2022-11-08 PROBLEM — E78.00 PRIMARY HYPERCHOLESTEROLEMIA: Status: ACTIVE | Noted: 2022-11-08

## 2022-11-08 PROBLEM — I21.9 ACUTE MYOCARDIAL INFARCTION (H): Status: ACTIVE | Noted: 2022-11-08

## 2022-11-08 PROBLEM — F10.21 ALCOHOL DEPENDENCE IN REMISSION (H): Status: ACTIVE | Noted: 2022-11-08

## 2022-11-08 ASSESSMENT — ENCOUNTER SYMPTOMS
CHILLS: 0
DYSURIA: 0
SORE THROAT: 0
COUGH: 0
MYALGIAS: 0
DIARRHEA: 0
SHORTNESS OF BREATH: 0
HEADACHES: 0
CONSTIPATION: 0
HEMATOCHEZIA: 0
PALPITATIONS: 0
JOINT SWELLING: 0
HEARTBURN: 0
FEVER: 0
FREQUENCY: 0
DIZZINESS: 0
ABDOMINAL PAIN: 0
WEAKNESS: 0
PARESTHESIAS: 0
ARTHRALGIAS: 1
NERVOUS/ANXIOUS: 0
EYE PAIN: 0
NAUSEA: 0
HEMATURIA: 0

## 2022-11-09 ENCOUNTER — OFFICE VISIT (OUTPATIENT)
Dept: FAMILY MEDICINE | Facility: CLINIC | Age: 62
End: 2022-11-09
Payer: COMMERCIAL

## 2022-11-09 VITALS
BODY MASS INDEX: 26.19 KG/M2 | RESPIRATION RATE: 14 BRPM | OXYGEN SATURATION: 97 % | HEART RATE: 75 BPM | SYSTOLIC BLOOD PRESSURE: 130 MMHG | WEIGHT: 157.4 LBS | DIASTOLIC BLOOD PRESSURE: 66 MMHG

## 2022-11-09 DIAGNOSIS — I10 ESSENTIAL HYPERTENSION, BENIGN: ICD-10-CM

## 2022-11-09 DIAGNOSIS — I21.4 ACUTE NON-ST ELEVATION MYOCARDIAL INFARCTION (NSTEMI) (H): ICD-10-CM

## 2022-11-09 DIAGNOSIS — Z23 NEED FOR VACCINATION: ICD-10-CM

## 2022-11-09 DIAGNOSIS — Z76.89 ESTABLISHING CARE WITH NEW DOCTOR, ENCOUNTER FOR: Primary | ICD-10-CM

## 2022-11-09 DIAGNOSIS — Z95.5 S/P CORONARY ARTERY STENT PLACEMENT: ICD-10-CM

## 2022-11-09 DIAGNOSIS — E78.5 HYPERLIPIDEMIA, UNSPECIFIED HYPERLIPIDEMIA TYPE: ICD-10-CM

## 2022-11-09 DIAGNOSIS — I25.10 CORONARY ARTERY DISEASE INVOLVING NATIVE CORONARY ARTERY OF NATIVE HEART WITHOUT ANGINA PECTORIS: ICD-10-CM

## 2022-11-09 DIAGNOSIS — I25.5 ISCHEMIC CARDIOMYOPATHY: ICD-10-CM

## 2022-11-09 PROCEDURE — 90677 PCV20 VACCINE IM: CPT | Performed by: PHYSICIAN ASSISTANT

## 2022-11-09 PROCEDURE — 99214 OFFICE O/P EST MOD 30 MIN: CPT | Mod: 25 | Performed by: PHYSICIAN ASSISTANT

## 2022-11-09 PROCEDURE — 90471 IMMUNIZATION ADMIN: CPT | Performed by: PHYSICIAN ASSISTANT

## 2022-11-09 PROCEDURE — 90750 HZV VACC RECOMBINANT IM: CPT | Performed by: PHYSICIAN ASSISTANT

## 2022-11-09 PROCEDURE — 90472 IMMUNIZATION ADMIN EACH ADD: CPT | Performed by: PHYSICIAN ASSISTANT

## 2022-11-09 ASSESSMENT — ENCOUNTER SYMPTOMS
ABDOMINAL PAIN: 0
FEVER: 0
PALPITATIONS: 0
COUGH: 0
DYSURIA: 0
DIARRHEA: 0
HEMATURIA: 0
ARTHRALGIAS: 0
PARESTHESIAS: 0
CHILLS: 0
EYE PAIN: 0
JOINT SWELLING: 0
SHORTNESS OF BREATH: 0
MYALGIAS: 0
WEAKNESS: 0
SORE THROAT: 0
NAUSEA: 0
HEADACHES: 0
FREQUENCY: 0
HEMATOCHEZIA: 0
CONSTIPATION: 0
DIZZINESS: 0
HEARTBURN: 0
NERVOUS/ANXIOUS: 0

## 2022-11-09 ASSESSMENT — PAIN SCALES - GENERAL: PAINLEVEL: NO PAIN (0)

## 2022-11-09 NOTE — PROGRESS NOTES
Chief Complaint   Patient presents with     Physical     Annual Physical. Would like to do shingles and pneumonia shots.        Assessment & Plan       ICD-10-CM    1. Establishing care with new doctor, encounter for  Z76.89       2. Need for vaccination  Z23 ZOSTER VACCINE RECOMBINANT ADJUVANTED (SHINGRIX)     Pneumococcal 20 Valent Conjugate (Prevnar 20)      3. Essential hypertension, benign  I10       4. Coronary artery disease involving native coronary artery of native heart without angina pectoris  I25.10       5. Hyperlipidemia, unspecified hyperlipidemia type  E78.5       6. S/P coronary artery stent placement  Z95.5       7. Acute non-ST elevation myocardial infarction (NSTEMI) (H)  I21.4       8. Ischemic cardiomyopathy  I25.5         #1 hypertension-blood pressure is 130/66 today.  He is currently on lisinopril, metoprolol, spironolactone, and hydrochlorothiazide.  We will continue his antihypertension medications at this time    #2 hyperlipidemia- lipid panel 8//2022 was stable at 70.  Currently on Crestor.    #3 coronary artery disease status post stents-he does have a drug-eluting stent in the RCA and LAD 2018.  He denies any chest pain, shortness of breath, lightheaded or dizziness.  He did undergo a angiogram February 2022 along with a stress test.  Holter monitor more recently was also negative for any arrhythmias.  Currently medically managed.  He does have nitroglycerin on hand but has not needed this.    #4 congestive heart failure-he denies any orthopnea.  He does get mildly winded with activity but nothing new.  Currently on Jardiance, spironolactone, and hydrochlorothiazide.  He does not have any lower extremity edema.  He does appear euvolemic.    #5 obstructive sleep apnea-uses CPAP.    #6 immunizations-we will update pneumonia and shingles vaccine    He is due for annual physical in January 2023.  Recommended having him follow-up at that time.    56}Total time spent was 32 minutes including  "reviewing records prior to arrival, consultation, placing orders, education, and reviewing the plan of care on the date of service.       BMI:   Estimated body mass index is 26.19 kg/m  as calculated from the following:    Height as of 10/20/22: 1.651 m (5' 5\").    Weight as of this encounter: 71.4 kg (157 lb 6.4 oz).   Weight management plan: Discussed healthy diet and exercise guidelines      No follow-ups on file.    ALFREDO Bermudez [facility]    Andrews Saha is a 62 year old, presenting for the following health issues:  Physical (Annual Physical. Would like to do shingles and pneumonia shots. )      Yifan is a pleasant 62-year-old male who presents to the clinic today to establish care.  He is past medical history significant for hypertension, hyperlipidemia, congestive heart failure, coronary artery disease status post stent to the RCA and LAD in 2018, and obstructive sleep apnea.  He does follow closely with cardiology.  He has no questions or concerns regarding his chronic health today.         Review of Systems   Constitutional: Negative for chills and fever.   HENT: Negative for congestion, ear pain, hearing loss and sore throat.    Eyes: Negative for pain and visual disturbance.   Respiratory: Negative for cough and shortness of breath.    Cardiovascular: Negative for chest pain, palpitations and peripheral edema.   Gastrointestinal: Negative for abdominal pain, constipation, diarrhea, heartburn, hematochezia and nausea.   Genitourinary: Negative for dysuria, frequency, genital sores, hematuria, impotence, penile discharge and urgency.   Musculoskeletal: Negative for arthralgias, joint swelling and myalgias.   Skin: Negative for rash.   Neurological: Negative for dizziness, weakness, headaches and paresthesias.   Psychiatric/Behavioral: Negative for mood changes. The patient is not nervous/anxious.          Objective    /66 (BP Location: Left arm, Patient " Position: Sitting, Cuff Size: Adult Regular)   Pulse 75   Resp 14   Wt 71.4 kg (157 lb 6.4 oz)   SpO2 97%   BMI 26.19 kg/m    Body mass index is 26.19 kg/m .  Physical Exam  Vitals and nursing note reviewed.   Constitutional:       Appearance: Normal appearance.   HENT:      Head: Normocephalic and atraumatic.   Eyes:      Conjunctiva/sclera: Conjunctivae normal.   Cardiovascular:      Rate and Rhythm: Normal rate and regular rhythm.      Heart sounds: No murmur heard.    No friction rub. No gallop.   Pulmonary:      Effort: Pulmonary effort is normal.      Breath sounds: Normal breath sounds and air entry. No wheezing, rhonchi or rales.   Musculoskeletal:      Cervical back: Neck supple.      Right lower leg: No edema.      Left lower leg: No edema.   Neurological:      General: No focal deficit present.      Mental Status: He is alert.      Cranial Nerves: Cranial nerves 2-12 are intact.      Motor: No weakness.      Gait: Gait is intact.

## 2022-11-21 ENCOUNTER — DOCUMENTATION ONLY (OUTPATIENT)
Dept: SLEEP MEDICINE | Facility: CLINIC | Age: 62
End: 2022-11-21

## 2022-11-21 DIAGNOSIS — G47.33 OBSTRUCTIVE SLEEP APNEA (ADULT) (PEDIATRIC): Primary | ICD-10-CM

## 2022-11-21 NOTE — PROGRESS NOTES
Patient came to [place]  for mask fitting appointment on November 21, 2022. Patient requested to switch masks because poor seal/leak, using an old F&P Simplus Full Face mask.   Discussed the following masks: AirFit F20, Vitera, AirFit F30i.  Patient selected a [employer]med Mask name: AirFit [id number] Face mask size Small.    Kate Garvey

## 2023-04-19 ENCOUNTER — OFFICE VISIT (OUTPATIENT)
Dept: FAMILY MEDICINE | Facility: CLINIC | Age: 63
End: 2023-04-19
Payer: COMMERCIAL

## 2023-04-19 VITALS
RESPIRATION RATE: 14 BRPM | OXYGEN SATURATION: 98 % | WEIGHT: 157 LBS | HEART RATE: 57 BPM | BODY MASS INDEX: 26.16 KG/M2 | HEIGHT: 65 IN | TEMPERATURE: 97.6 F | DIASTOLIC BLOOD PRESSURE: 72 MMHG | SYSTOLIC BLOOD PRESSURE: 120 MMHG

## 2023-04-19 DIAGNOSIS — I25.10 CORONARY ARTERY DISEASE INVOLVING NATIVE CORONARY ARTERY OF NATIVE HEART WITHOUT ANGINA PECTORIS: ICD-10-CM

## 2023-04-19 DIAGNOSIS — I50.30 NYHA CLASS 3 HEART FAILURE WITH PRESERVED EJECTION FRACTION (H): ICD-10-CM

## 2023-04-19 DIAGNOSIS — N18.31 STAGE 3A CHRONIC KIDNEY DISEASE (H): ICD-10-CM

## 2023-04-19 DIAGNOSIS — Z12.5 SCREENING FOR PROSTATE CANCER: ICD-10-CM

## 2023-04-19 DIAGNOSIS — E78.5 HYPERLIPIDEMIA, UNSPECIFIED HYPERLIPIDEMIA TYPE: Primary | ICD-10-CM

## 2023-04-19 DIAGNOSIS — I10 ESSENTIAL HYPERTENSION, BENIGN: ICD-10-CM

## 2023-04-19 DIAGNOSIS — Z13.1 SCREENING FOR DIABETES MELLITUS: ICD-10-CM

## 2023-04-19 DIAGNOSIS — E78.00 PRIMARY HYPERCHOLESTEROLEMIA: ICD-10-CM

## 2023-04-19 DIAGNOSIS — Z95.5 S/P CORONARY ARTERY STENT PLACEMENT: ICD-10-CM

## 2023-04-19 LAB
ALBUMIN SERPL BCG-MCNC: 4.8 G/DL (ref 3.5–5.2)
ALP SERPL-CCNC: 97 U/L (ref 40–129)
ALT SERPL W P-5'-P-CCNC: 19 U/L (ref 10–50)
ANION GAP SERPL CALCULATED.3IONS-SCNC: 14 MMOL/L (ref 7–15)
AST SERPL W P-5'-P-CCNC: 31 U/L (ref 10–50)
BILIRUB SERPL-MCNC: 1.2 MG/DL
BUN SERPL-MCNC: 15.1 MG/DL (ref 8–23)
CALCIUM SERPL-MCNC: 10 MG/DL (ref 8.8–10.2)
CHLORIDE SERPL-SCNC: 97 MMOL/L (ref 98–107)
CREAT SERPL-MCNC: 1.03 MG/DL (ref 0.67–1.17)
DEPRECATED HCO3 PLAS-SCNC: 25 MMOL/L (ref 22–29)
ERYTHROCYTE [DISTWIDTH] IN BLOOD BY AUTOMATED COUNT: 12.3 % (ref 10–15)
GFR SERPL CREATININE-BSD FRML MDRD: 82 ML/MIN/1.73M2
GLUCOSE SERPL-MCNC: 84 MG/DL (ref 70–99)
HBA1C MFR BLD: 5.4 % (ref 0–5.6)
HCT VFR BLD AUTO: 44.1 % (ref 40–53)
HGB BLD-MCNC: 15.5 G/DL (ref 13.3–17.7)
MCH RBC QN AUTO: 32.4 PG (ref 26.5–33)
MCHC RBC AUTO-ENTMCNC: 35.1 G/DL (ref 31.5–36.5)
MCV RBC AUTO: 92 FL (ref 78–100)
PLATELET # BLD AUTO: 322 10E3/UL (ref 150–450)
POTASSIUM SERPL-SCNC: 4.2 MMOL/L (ref 3.4–5.3)
PROT SERPL-MCNC: 8.1 G/DL (ref 6.4–8.3)
PSA SERPL DL<=0.01 NG/ML-MCNC: 0.76 NG/ML (ref 0–4.5)
RBC # BLD AUTO: 4.79 10E6/UL (ref 4.4–5.9)
SODIUM SERPL-SCNC: 136 MMOL/L (ref 136–145)
WBC # BLD AUTO: 7.9 10E3/UL (ref 4–11)

## 2023-04-19 PROCEDURE — 99214 OFFICE O/P EST MOD 30 MIN: CPT | Performed by: PHYSICIAN ASSISTANT

## 2023-04-19 PROCEDURE — 36415 COLL VENOUS BLD VENIPUNCTURE: CPT | Performed by: PHYSICIAN ASSISTANT

## 2023-04-19 PROCEDURE — 85027 COMPLETE CBC AUTOMATED: CPT | Performed by: PHYSICIAN ASSISTANT

## 2023-04-19 PROCEDURE — 80053 COMPREHEN METABOLIC PANEL: CPT | Performed by: PHYSICIAN ASSISTANT

## 2023-04-19 PROCEDURE — G0103 PSA SCREENING: HCPCS | Performed by: PHYSICIAN ASSISTANT

## 2023-04-19 PROCEDURE — 83036 HEMOGLOBIN GLYCOSYLATED A1C: CPT | Performed by: PHYSICIAN ASSISTANT

## 2023-04-19 ASSESSMENT — ENCOUNTER SYMPTOMS
CHILLS: 0
DIZZINESS: 0
SHORTNESS OF BREATH: 0
FLANK PAIN: 0
PALPITATIONS: 0
NAUSEA: 0
COUGH: 0
DIARRHEA: 0
ABDOMINAL PAIN: 0
CONSTIPATION: 0
VOMITING: 0
FREQUENCY: 0
FATIGUE: 0
LIGHT-HEADEDNESS: 0
HEADACHES: 0
HEARTBURN: 0

## 2023-04-19 NOTE — PROGRESS NOTES
Assessment & Plan       ICD-10-CM    1. Hyperlipidemia, unspecified hyperlipidemia type  E78.5       2. Essential hypertension, benign  I10 CBC with platelets     Comprehensive metabolic panel (BMP + Alb, Alk Phos, ALT, AST, Total. Bili, TP)     CBC with platelets     Comprehensive metabolic panel (BMP + Alb, Alk Phos, ALT, AST, Total. Bili, TP)      3. Primary hypercholesterolemia  E78.00       4. Coronary artery disease involving native coronary artery of native heart without angina pectoris  I25.10       5. Stage 3a chronic kidney disease (H)  N18.31       6. NYHA class 3 heart failure with preserved ejection fraction (H)  I50.30       7. S/P coronary artery stent placement  Z95.5       8. Screening for prostate cancer  Z12.5 PSA, screen     PSA, screen      9. Screening for diabetes mellitus  Z13.1 Hemoglobin A1c     Hemoglobin A1c        #1 hypertension  Blood pressure well controlled at 120/72 today.  We will continue his antihypertension medications.  He is to watch salt and alcohol intake.  Stay active    #2 hyperlipidemia  Continue with rosuvastatin 20 mg and gemfibrozil.  Last lipid panel done 8/2022 showed an LDL 70, triglycerides 103, HDL 44.  Diet and exercise discussed today we will check LFTs    #3 coronary artery disease status post stent  Underwent a drug-eluting stent to the RCA and LAD in 2018.  She denies any chest pain.  Blood pressure is well controlled.  Has nitroglycerin but has not needed this.  Last cardiology visit was July 2022.    #4 congestive heart failure with preserved ejection fraction  Continue with Jardiance, metoprolol, lisinopril and spironolactone.  Recently was discontinued hydrochlorothiazide when the Jardiance was started.  We will check kidney function today.  He is not having any issues with shortness of breath or orthopnea.  He appears euvolemic today.    #5 chronic kidney disease  Last kidney function 8/2022 showed a creatinine of 1.28 with a GFR of 63.  Blood pressure  is well controlled we will avoid nephrotoxic medication.  We will check a CBC and a CMP today.    #6 BPH  He would like to continue to monitor this as symptoms have improved a bit.  We did discuss Flomax.  He will let me know if things change    #7 left groin pain  No notable hernia noted on exam.  I am wondering if he is having some pain from scar tissue over the hernia mesh that he had placed 15 years ago.  I did offer to do an ultrasound for evaluation but he states that he will hold off as this is intermittent.  If he has worsening pain or new symptoms he is to let me know and we will get an ultrasound for further evaluation    #8 prostate cancer screening  We will check a PSA level today    #9 colon cancer screening  Next colonoscopy due 11/25    #10 screening for diabetes  Last A1c 10/2020 was stable at 5.4.  He is on Jardiance for congestive heart failure with preserved ejection fraction not diabetes.  We will check an A1c today     Follow-up Visit   Expected date:  Oct 19, 2023 (Approximate)      Follow Up Appointment Details:     Follow-up with whom?: Me    Follow-Up for what?: Adult Preventive    Any Additional Chronic Condition Management?: General (Other)    How?: In Person                      ALFREDO Bermudez [facility]    Andrews Saha is a 62 year old, presenting for the following health issues:  Medication Update and Abdominal Pain (LLQ abd discomfort (pulling sensation) comes and goes after standing awhile or moving certain ways ongoing for about 6 months - a year)        4/19/2023     2:41 PM   Additional Questions   Roomed by Gen PADMA CMA         4/19/2023     2:41 PM   Patient Reported Additional Medications   Patient reports taking the following new medications [email or web address]     Yifan is a pleasant 62-year-old male that presents to the clinic today for med check.  Past medical history significant for hypertension, hyperlipidemia, coronary artery disease status  post drug-eluting stent, congestive heart failure with preserved ejection fracture, alcohol use disorder, obstructive sleep apnea, and BPH.  He is feeling well has no questions or concerns regarding his chronic health today.    He is currently on Jardiance, lisinopril, metoprolol, and cysts spirolactone for coronary artery disease, hypertension, and congestive heart failure with preserved ejection fraction.  He was started on Jardiance and hydrochlorothiazide was discontinued for congestive heart failure.  He has not been having any shortness of breath or orthopnea.  He did undergo a drug-eluting stent to the RCA and LAD in 2018.  He denies any chest pain or shortness of breath at this time.    He has been having some pain to the left groin area for about a year.  It comes and goes.  Certain movements he gets a sharp pinching sensation to the left groin area.  He did have a inguinal hernia repair about 15 years ago.    He also states he has been having issues with his prostate and urinary flow.  He is having hesitation and having to bear down.  He is only getting up 1 time a night to urinate.  He feels that this comes and goes and over the last few months things have actually stabilized.    Currently on gemfibrozil and rosuvastatin for cholesterol management.  He does have nitroglycerin but has not been needing to use this.    History of Present Illness       Reason for visit:  Off and on pain in left abdomen. test prostate    He eats 0-1 servings of fruits and vegetables daily.He consumes 1 sweetened beverage(s) daily.He exercises with enough effort to increase his heart rate 9 or less minutes per day.  He exercises with enough effort to increase his heart rate 3 or less days per week.   He is taking medications regularly.         Review of Systems   Constitutional: Negative for chills and fatigue.   Respiratory: Negative for cough and shortness of breath.    Cardiovascular: Negative for chest pain and  "palpitations.   Gastrointestinal: Negative for abdominal pain, constipation, diarrhea, heartburn, nausea and vomiting.   Genitourinary: Negative for decreased urine volume, flank pain, frequency, penile discharge, penile pain, testicular pain and urgency.        Urinary stream with hesitation at times.  Left groin pain   Skin: Negative for rash.   Neurological: Negative for dizziness, light-headedness and headaches.         Objective    /72   Pulse 57   Temp 97.6  F (36.4  C) (Oral)   Resp 14   Ht 1.638 m (5' 4.5\")   Wt 71.2 kg (157 lb)   SpO2 98%   BMI 26.53 kg/m    Body mass index is 26.53 kg/m .  Physical Exam  Vitals and nursing note reviewed.   Constitutional:       Appearance: Normal appearance.   HENT:      Head: Normocephalic and atraumatic.   Eyes:      Conjunctiva/sclera: Conjunctivae normal.   Neck:      Vascular: No carotid bruit or JVD.   Cardiovascular:      Rate and Rhythm: Normal rate and regular rhythm.      Heart sounds: No murmur heard.     No friction rub. No gallop.   Pulmonary:      Effort: Pulmonary effort is normal.      Breath sounds: No wheezing, rhonchi or rales.   Abdominal:      Palpations: Abdomen is soft.      Tenderness: There is no abdominal tenderness. There is no guarding or rebound.      Hernia: There is no hernia in the left inguinal area or right inguinal area.   Genitourinary:     Comments: No pain in the left inguinal region.  No left inguinal hernia noted.  Musculoskeletal:      Right lower leg: No edema.      Left lower leg: No edema.   Lymphadenopathy:      Cervical: No cervical adenopathy.      Lower Body: No right inguinal adenopathy. No left inguinal adenopathy.   Neurological:      Mental Status: He is alert.                    "

## 2023-04-30 DIAGNOSIS — I10 ESSENTIAL HYPERTENSION, BENIGN: ICD-10-CM

## 2023-04-30 DIAGNOSIS — E78.5 HYPERLIPIDEMIA, UNSPECIFIED HYPERLIPIDEMIA TYPE: ICD-10-CM

## 2023-05-01 RX ORDER — METOPROLOL SUCCINATE 100 MG/1
TABLET, EXTENDED RELEASE ORAL
Qty: 90 TABLET | Refills: 0 | Status: SHIPPED | OUTPATIENT
Start: 2023-05-01 | End: 2023-07-25

## 2023-05-01 RX ORDER — ROSUVASTATIN CALCIUM 20 MG/1
TABLET, COATED ORAL
Qty: 90 TABLET | Refills: 0 | Status: SHIPPED | OUTPATIENT
Start: 2023-05-01 | End: 2023-07-25

## 2023-05-04 ENCOUNTER — OFFICE VISIT (OUTPATIENT)
Dept: CARDIOLOGY | Facility: CLINIC | Age: 63
End: 2023-05-04
Attending: INTERNAL MEDICINE
Payer: COMMERCIAL

## 2023-05-04 VITALS
RESPIRATION RATE: 16 BRPM | BODY MASS INDEX: 25.86 KG/M2 | WEIGHT: 153 LBS | DIASTOLIC BLOOD PRESSURE: 68 MMHG | SYSTOLIC BLOOD PRESSURE: 128 MMHG | OXYGEN SATURATION: 96 % | HEART RATE: 60 BPM

## 2023-05-04 DIAGNOSIS — I25.10 CORONARY ARTERY DISEASE INVOLVING NATIVE CORONARY ARTERY WITHOUT ANGINA PECTORIS, UNSPECIFIED WHETHER NATIVE OR TRANSPLANTED HEART: ICD-10-CM

## 2023-05-04 DIAGNOSIS — I10 HYPERTENSION, UNSPECIFIED TYPE: ICD-10-CM

## 2023-05-04 DIAGNOSIS — I50.30 HEART FAILURE WITH PRESERVED EJECTION FRACTION, NYHA CLASS II (H): ICD-10-CM

## 2023-05-04 DIAGNOSIS — E78.5 DYSLIPIDEMIA: Primary | ICD-10-CM

## 2023-05-04 LAB — LDLC SERPL DIRECT ASSAY-MCNC: 55 MG/DL

## 2023-05-04 PROCEDURE — 99214 OFFICE O/P EST MOD 30 MIN: CPT | Performed by: INTERNAL MEDICINE

## 2023-05-04 PROCEDURE — 36415 COLL VENOUS BLD VENIPUNCTURE: CPT | Performed by: INTERNAL MEDICINE

## 2023-05-04 PROCEDURE — 83721 ASSAY OF BLOOD LIPOPROTEIN: CPT | Performed by: INTERNAL MEDICINE

## 2023-05-04 NOTE — PROGRESS NOTES
[facility] HEART CARE [address], [place] [address]   [email or web address]   OFFICE: [phone]          Thank you Arjun Gifford for asking the [facility] Heart Care team to participate in the care of your patient, Venancio Tolentino.     Impression and Plan     1.  Coronary artery disease. Yifan he has known coronary artery disease having had prior PCI with stent deployment to the ostial/proximal LAD, distal LAD, as well as proximal-mid right coronary artery in 2015.  Angiogram 25 February 2022 revealed no significant obstructive coronary disease and wide patency of the aforementioned stents.    This is stable.  He denies any chest pain or other concerning symptoms.    2.  Hypertension.  Blood pressure is reasonable in the office today at 128/68 mmHg.    3.  Dyslipidemia.  Lipid profile 1 August 2022 revealed LDL 70 mg/dL HDL 44 mg/dL.    Plan to obtain direct LDL this afternoon.    Plan on follow-up in 1 year.          35 minutes spent reviewing prior records (including documentation, laboratory studies, cardiac testing/imaging), interview with patient along with physical exam, planning, and subsequent documentation/crafting of note).           History of Present Illness    Once again I would like to thank you again for asking me to participate in the care of your patient, Venancio Tolentino.  As you know, but to reiterate for my own records, Venancio Tolentino is a 62 year old male historically followed in the [facility] by my colleague, Dr. Frank Wilburn.    iYfan he has known coronary artery disease having had prior PCI with stent deployment to the ostial/proximal LAD, distal LAD, as well as proximal-mid right coronary artery in 2015.  Angiogram 25 February 2022 revealed no significant obstructive coronary disease and wide patency of the aforementioned stents.    On interview today, Yifan states he has been doing very well.  He denies any chest  pain symptoms of concern.  Breathing is comfortable.  No decline in exercise tolerance.  No palpitations or lightheadedness.    Further review of systems is otherwise negative/noncontributory (medical record and 13 point review of systems reviewed as well and pertinent positives noted).         Cardiac Diagnostics      Echocardiogram 4 January 2021:  1. Normal left ventricular size and systolic performance with ejection fraction of 55 to 60%.  2. No significant valvular heart disease.  3. Normal right ventricular size and systolic performance.    Coronary angiogram 25 February 2022:  1. Left anterior descending coronary artery: Previously deployed stent and ostial/proximal LAD is widely patent.  Previously deployed stent in distal LAD is widely patent.  2. Circumflex coronary artery: First obtuse marginal with 25% stenosis.  3. Right coronary artery: Proximal 20% stenosis.  Previously deployed stent in proximal-mid right coronary artery is widely patent.    Holter monitor 28 July 2022:  1. Holter monitoring from 7/28/2022 to 7/29/2022 (duration 24hrs).  2. Predominant underlying rhythm was sinus rhythm, 62 to 100bpm, average 76bpm.  3. 1 episode of nonsustained atrial tachycardia lasting 10 beats, 132bpm.  4. No sustained tachyarrhythmias.  5. No atrial fibrillation.  6. There were no pauses of greater than 3 seconds.  7. Rare supraventricular ectopic beats (<1%).  8. Rare premature ventricular contractions (<1%).  9. No symptoms recorded.         Physical Examination       /68 (BP Location: Right arm, Patient Position: Sitting, Cuff Size: Adult Regular)   Pulse 60   Resp 16   Wt 69.4 kg (153 lb)   SpO2 96%   BMI 25.86 kg/m          Wt Readings from Last 3 Encounters:   05/04/23 69.4 kg (153 lb)   04/19/23 71.2 kg (157 lb)   11/09/22 71.4 kg (157 lb 6.4 oz)       The patient is alert and oriented times three. Sclerae are anicteric. Mucosal membranes are moist. Jugular venous pressure is normal. No  "significant adenopathy/thyromegally appreciated. Lungs are clear with good expansion. On cardiovascular exam, the patient has a regular S1 and S2. Abdomen is soft and non-tender. Extremities reveal no clubbing, cyanosis, or edema.           Medications  Allergies   Current Outpatient Medications   Medication Sig Dispense Refill     aspirin (ASA) 81 MG chewable tablet Take 2 tablets (162 mg) by mouth daily       coenzyme Q-10 capsule Take 1 capsule by mouth daily       empagliflozin (JARDIANCE) 10 MG TABS tablet Take 1 tablet (10 mg) by mouth daily 90 tablet 11     fish oil-omega-3 fatty acids 1000 MG capsule Take 2 g by mouth daily       gemfibrozil (LOPID) 600 MG tablet Take 1 tablet by mouth twice daily 180 tablet 1     lisinopril (ZESTRIL) 20 MG tablet Take 1 tablet (20 mg) by mouth daily 90 tablet 11     metoprolol succinate ER (TOPROL XL) 100 MG 24 hr tablet Take 1 tablet by mouth once daily 90 tablet 0     rosuvastatin (CRESTOR) 20 MG tablet Take 1 tablet by mouth once daily 90 tablet 0     spironolactone (ALDACTONE) 25 MG tablet Take 0.5 tablets (12.5 mg) by mouth daily 45 tablet 11     nitroGLYcerin (NITROSTAT) 0.4 MG sublingual tablet One tablet under the tongue every 5 minutes if needed for chest pain. May repeat every 5 minutes for a maximum of 3 doses in 15 minutes\" (Patient not taking: Reported on 4/19/2023) 25 tablet 3     No Known Allergies       Lab Results    Chemistry/lipid CBC Cardiac Enzymes/BNP/TSH/INR   Recent Labs   Lab Test 08/01/22  1559   CHOL 135   HDL 44   LDL 70   TRIG 103     Recent Labs   Lab Test 08/01/22  1559 01/18/22  1602 10/05/20  1712   LDL 70 47 106     Recent Labs   Lab Test 04/19/23  1509      POTASSIUM 4.2   CHLORIDE 97*   CO2 25   GLC 84   BUN 15.1   CR 1.03   GFRESTIMATED 82   DEENA 10.0     Recent Labs   Lab Test 04/19/23  1509 08/01/22  1559 07/25/22  1511   CR 1.03 1.28 1.47*     Recent Labs   Lab Test 04/19/23  1509 10/05/20  1712 09/04/19  1723   A1C 5.4 5.4 5.1 "          Recent Labs   Lab Test 04/19/23  1509   WBC 7.9   HGB 15.5   HCT 44.1   MCV 92        Recent Labs   Lab Test 04/19/23  1509 02/25/22  0812 10/05/20  1712   HGB 15.5 15.3 15.8    No results for input(s): TROPONINI in the last 16930 hours.  Recent Labs   Lab Test 03/16/22  0825   BNP 34     Recent Labs   Lab Test 05/04/18  0919   TSH 1.52     No results for input(s): INR in the last 72179 hours.     Medical History  Surgical History Family History Social History   Past Medical History:   Diagnosis Date     Tobacco use disorder 9/8/2006     Past Surgical History:   Procedure Laterality Date     CARDIAC CATHETERIZATION  2014     CARDIAC CATHETERIZATION  2015     CARDIAC CATHETERIZATION  05/04/2018     CORONARY STENT PLACEMENT      rca and lad     CV CORONARY ANGIOGRAM N/A 5/4/2018    Procedure: Coronary Angiogram;  Surgeon: Frank Wilburn MD;  Location: [facility] Cath Lab;  Service:      CV CORONARY ANGIOGRAM N/A 2/25/2022    Procedure: Coronary Angiogram;  Surgeon: Nona George MD;  Location: [facility] CATH LAB CV     CV LEFT HEART CATH N/A 2/25/2022    Procedure: Left Heart Cath;  Surgeon: Nona George MD;  Location: [facility] CATH LAB CV     CV LEFT HEART CATHETERIZATION WITH LEFT VENTRICULOGRAM N/A 5/4/2018    Procedure: Left Heart Catheterization with Left Ventriculogram;  Surgeon: Frank Wilburn MD;  Location: [facility] Cath Lab;  Service:      HERNIA REPAIR       SURGICAL HISTORY OF -   1993    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     SURGICAL HISTORY OF -   7/7/2000    Right inguinal hernia with high ligation of indirect hernia sac and placement of Prolene mesh     Family History   Problem Relation Age of Onset     Hypertension Mother      Cerebrovascular Disease Mother      Cardiovascular Mother         Marfan's Syndrome     Respiratory Father         emphesema     Snoring Father      Asthma Sister      Cardiovascular Sister         aortic  aneurism Marfan's syndrome.        Social History     Socioeconomic History     Marital status:      Spouse name: Linda     Number of children: 3     Years of education: Not on file     Highest education level: Not on file   Occupational History     Not on file   Tobacco Use     Smoking status: Former     Packs/day: 1.00     Years: 25.00     Pack years: 25.00     Types: Cigarettes     Quit date:      Years since quittin.3     Smokeless tobacco: Never   Vaping Use     Vaping status: Not on file   Substance and Sexual Activity     Alcohol use: No     Drug use: Never     Sexual activity: Not Currently     Partners: Female   Other Topics Concern     Not on file   Social History Narrative     Not on file     Social Determinants of Health     Financial Resource Strain: Not on file   Food Insecurity: Not on file   Transportation Needs: Not on file   Physical Activity: Not on file   Stress: Not on file   Social Connections: Not on file   Intimate Partner Violence: Not on file   Housing Stability: Not on file

## 2023-05-04 NOTE — LETTER
5/4/2023    Arjun Escalante PA-C  [address]  [facility] [address]    RE: Venancio YOUNG Rajan       Dear Colleague,     I had the pleasure of seeing Venancio Tolentino in the [facility].         [facility] HEART CARE   [address], [place] [address]   [email or web address]   OFFICE: [phone]          Thank you Arjun Gifford for asking the [facility] Heart Care team to participate in the care of your patient, Venancio Tolentino.     Impression and Plan     1.  Coronary artery disease. Yifan he has known coronary artery disease having had prior PCI with stent deployment to the ostial/proximal LAD, distal LAD, as well as proximal-mid right coronary artery in 2015.  Angiogram 25 February 2022 revealed no significant obstructive coronary disease and wide patency of the aforementioned stents.    This is stable.  He denies any chest pain or other concerning symptoms.    2.  Hypertension.  Blood pressure is reasonable in the office today at 128/68 mmHg.    3.  Dyslipidemia.  Lipid profile 1 August 2022 revealed LDL 70 mg/dL HDL 44 mg/dL.  Plan to obtain direct LDL this afternoon.    Plan on follow-up in 1 year.          35 minutes spent reviewing prior records (including documentation, laboratory studies, cardiac testing/imaging), interview with patient along with physical exam, planning, and subsequent documentation/crafting of note).           History of Present Illness    Once again I would like to thank you again for asking me to participate in the care of your patient, Venancio Tolentino.  As you know, but to reiterate for my own records, Venancio Tolentino is a 62 year old male historically followed in the [facility] by my colleague, Dr. Frank Wilburn.    Yifan gandara has known coronary artery disease having had prior PCI with stent deployment to the ostial/proximal LAD, distal LAD, as well as proximal-mid right coronary artery in  2015.  Angiogram 25 February 2022 revealed no significant obstructive coronary disease and wide patency of the aforementioned stents.    On interview today, Yifan states he has been doing very well.  He denies any chest pain symptoms of concern.  Breathing is comfortable.  No decline in exercise tolerance.  No palpitations or lightheadedness.    Further review of systems is otherwise negative/noncontributory (medical record and 13 point review of systems reviewed as well and pertinent positives noted).         Cardiac Diagnostics      Echocardiogram 4 January 2021:  Normal left ventricular size and systolic performance with ejection fraction of 55 to 60%.  No significant valvular heart disease.  Normal right ventricular size and systolic performance.    Coronary angiogram 25 February 2022:  Left anterior descending coronary artery: Previously deployed stent and ostial/proximal LAD is widely patent.  Previously deployed stent in distal LAD is widely patent.  Circumflex coronary artery: First obtuse marginal with 25% stenosis.  Right coronary artery: Proximal 20% stenosis.  Previously deployed stent in proximal-mid right coronary artery is widely patent.    Holter monitor 28 July 2022:  Holter monitoring from 7/28/2022 to 7/29/2022 (duration 24hrs).  Predominant underlying rhythm was sinus rhythm, 62 to 100bpm, average 76bpm.  1 episode of nonsustained atrial tachycardia lasting 10 beats, 132bpm.  No sustained tachyarrhythmias.  No atrial fibrillation.  There were no pauses of greater than 3 seconds.  Rare supraventricular ectopic beats (<1%).  Rare premature ventricular contractions (<1%).  No symptoms recorded.         Physical Examination       /68 (BP Location: Right arm, Patient Position: Sitting, Cuff Size: Adult Regular)   Pulse 60   Resp 16   Wt 69.4 kg (153 lb)   SpO2 96%   BMI 25.86 kg/m          Wt Readings from Last 3 Encounters:   05/04/23 69.4 kg (153 lb)   04/19/23 71.2 kg (157 lb)   11/09/22  "71.4 kg (157 lb 6.4 oz)       The patient is alert and oriented times three. Sclerae are anicteric. Mucosal membranes are moist. Jugular venous pressure is normal. No significant adenopathy/thyromegally appreciated. Lungs are clear with good expansion. On cardiovascular exam, the patient has a regular S1 and S2. Abdomen is soft and non-tender. Extremities reveal no clubbing, cyanosis, or edema.           Medications  Allergies   Current Outpatient Medications   Medication Sig Dispense Refill    aspirin (ASA) 81 MG chewable tablet Take 2 tablets (162 mg) by mouth daily      coenzyme Q-10 capsule Take 1 capsule by mouth daily      empagliflozin (JARDIANCE) 10 MG TABS tablet Take 1 tablet (10 mg) by mouth daily 90 tablet 11    fish oil-omega-3 fatty acids 1000 MG capsule Take 2 g by mouth daily      gemfibrozil (LOPID) 600 MG tablet Take 1 tablet by mouth twice daily 180 tablet 1    lisinopril (ZESTRIL) 20 MG tablet Take 1 tablet (20 mg) by mouth daily 90 tablet 11    metoprolol succinate ER (TOPROL XL) 100 MG 24 hr tablet Take 1 tablet by mouth once daily 90 tablet 0    rosuvastatin (CRESTOR) 20 MG tablet Take 1 tablet by mouth once daily 90 tablet 0    spironolactone (ALDACTONE) 25 MG tablet Take 0.5 tablets (12.5 mg) by mouth daily 45 tablet 11    nitroGLYcerin (NITROSTAT) 0.4 MG sublingual tablet One tablet under the tongue every 5 minutes if needed for chest pain. May repeat every 5 minutes for a maximum of 3 doses in 15 minutes\" (Patient not taking: Reported on 4/19/2023) 25 tablet 3     No Known Allergies       Lab Results    Chemistry/lipid CBC Cardiac Enzymes/BNP/TSH/INR   Recent Labs   Lab Test 08/01/22  1559   CHOL 135   HDL 44   LDL 70   TRIG 103     Recent Labs   Lab Test 08/01/22  1559 01/18/22  1602 10/05/20  1712   LDL 70 47 106     Recent Labs   Lab Test 04/19/23  1509      POTASSIUM 4.2   CHLORIDE 97*   CO2 25   GLC 84   BUN 15.1   CR 1.03   GFRESTIMATED 82   DEENA 10.0     Recent Labs   Lab Test " 04/19/23  1509 08/01/22  1559 07/25/22  1511   CR 1.03 1.28 1.47*     Recent Labs   Lab Test 04/19/23  1509 10/05/20  1712 09/04/19  1723   A1C 5.4 5.4 5.1          Recent Labs   Lab Test 04/19/23  1509   WBC 7.9   HGB 15.5   HCT 44.1   MCV 92        Recent Labs   Lab Test 04/19/23  1509 02/25/22  0812 10/05/20  1712   HGB 15.5 15.3 15.8    No results for input(s): TROPONINI in the last 37685 hours.  Recent Labs   Lab Test 03/16/22  0825   BNP 34     Recent Labs   Lab Test 05/04/18  0919   TSH 1.52     No results for input(s): INR in the last 44617 hours.     Medical History  Surgical History Family History Social History   Past Medical History:   Diagnosis Date    Tobacco use disorder 9/8/2006     Past Surgical History:   Procedure Laterality Date    CARDIAC CATHETERIZATION  2014    CARDIAC CATHETERIZATION  2015    CARDIAC CATHETERIZATION  05/04/2018    CORONARY STENT PLACEMENT      rca and lad    CV CORONARY ANGIOGRAM N/A 5/4/2018    Procedure: Coronary Angiogram;  Surgeon: Frank Wilburn MD;  Location: [facility] Cath Lab;  Service:     CV CORONARY ANGIOGRAM N/A 2/25/2022    Procedure: Coronary Angiogram;  Surgeon: Nona George MD;  Location: [facility] CATH LAB CV    CV LEFT HEART CATH N/A 2/25/2022    Procedure: Left Heart Cath;  Surgeon: Nona George MD;  Location: [facility] CATH LAB CV    CV LEFT HEART CATHETERIZATION WITH LEFT VENTRICULOGRAM N/A 5/4/2018    Procedure: Left Heart Catheterization with Left Ventriculogram;  Surgeon: Frank Wilburn MD;  Location: [facility] Cath Lab;  Service:     HERNIA REPAIR      SURGICAL HISTORY OF -   1993    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    SURGICAL HISTORY OF -   7/7/2000    Right inguinal hernia with high ligation of indirect hernia sac and placement of Prolene mesh     Family History   Problem Relation Age of Onset    Hypertension Mother     Cerebrovascular Disease Mother     Cardiovascular Mother          Marfan's Syndrome    Respiratory Father         emphesema    Snoring Father     Asthma Sister     Cardiovascular Sister         aortic aneurism Marfan's syndrome.        Social History     Socioeconomic History    Marital status:      Spouse name: Linda    Number of children: 3    Years of education: Not on file    Highest education level: Not on file   Occupational History    Not on file   Tobacco Use    Smoking status: Former     Packs/day: 1.00     Years: 25.00     Pack years: 25.00     Types: Cigarettes     Quit date:      Years since quittin.3    Smokeless tobacco: Never   Vaping Use    Vaping status: Not on file   Substance and Sexual Activity    Alcohol use: No    Drug use: Never    Sexual activity: Not Currently     Partners: Female   Other Topics Concern    Not on file   Social History Narrative    Not on file     Social Determinants of Health     Financial Resource Strain: Not on file   Food Insecurity: Not on file   Transportation Needs: Not on file   Physical Activity: Not on file   Stress: Not on file   Social Connections: Not on file   Intimate Partner Violence: Not on file   Housing Stability: Not on file                      Thank you for allowing me to participate in the care of your patient.      Sincerely,     Keyonna Hartley MD     [facility] Heart Care  cc:   Frank Wilburn MD  [address]  [place] [address]

## 2023-05-05 DIAGNOSIS — E78.5 DYSLIPIDEMIA: Primary | ICD-10-CM

## 2023-05-08 DIAGNOSIS — E78.5 DYSLIPIDEMIA: Primary | ICD-10-CM

## 2023-05-21 DIAGNOSIS — I10 ESSENTIAL HYPERTENSION, BENIGN: ICD-10-CM

## 2023-05-23 RX ORDER — LISINOPRIL 20 MG/1
TABLET ORAL
Qty: 90 TABLET | Refills: 3 | Status: SHIPPED | OUTPATIENT
Start: 2023-05-23 | End: 2024-05-13

## 2023-07-17 ENCOUNTER — OFFICE VISIT (OUTPATIENT)
Dept: FAMILY MEDICINE | Facility: CLINIC | Age: 63
End: 2023-07-17
Payer: COMMERCIAL

## 2023-07-17 VITALS
RESPIRATION RATE: 16 BRPM | DIASTOLIC BLOOD PRESSURE: 75 MMHG | HEART RATE: 94 BPM | TEMPERATURE: 97.9 F | OXYGEN SATURATION: 95 % | SYSTOLIC BLOOD PRESSURE: 144 MMHG

## 2023-07-17 DIAGNOSIS — R20.2 PARESTHESIA OF LEFT ARM: Primary | ICD-10-CM

## 2023-07-17 DIAGNOSIS — R29.898 THUMB WEAKNESS: ICD-10-CM

## 2023-07-17 DIAGNOSIS — M54.12 CERVICAL RADICULOPATHY: ICD-10-CM

## 2023-07-17 LAB
ATRIAL RATE - MUSE: 71 BPM
DIASTOLIC BLOOD PRESSURE - MUSE: NORMAL MMHG
INTERPRETATION ECG - MUSE: NORMAL
P AXIS - MUSE: 72 DEGREES
PR INTERVAL - MUSE: 164 MS
QRS DURATION - MUSE: 86 MS
QT - MUSE: 434 MS
QTC - MUSE: 472 MS
R AXIS - MUSE: 48 DEGREES
SYSTOLIC BLOOD PRESSURE - MUSE: NORMAL MMHG
T AXIS - MUSE: 70 DEGREES
VENTRICULAR RATE- MUSE: 71 BPM

## 2023-07-17 PROCEDURE — 93005 ELECTROCARDIOGRAM TRACING: CPT | Performed by: PHYSICIAN ASSISTANT

## 2023-07-17 PROCEDURE — 99214 OFFICE O/P EST MOD 30 MIN: CPT | Mod: 25 | Performed by: PHYSICIAN ASSISTANT

## 2023-07-17 PROCEDURE — 93010 ELECTROCARDIOGRAM REPORT: CPT | Performed by: INTERNAL MEDICINE

## 2023-07-17 NOTE — PROGRESS NOTES
Chief Complaint   Patient presents with     Arm Pain     Left arm tingling and has numbness in hand hurts under left arm sx's for 4-5 days       ASSESSMENT/PLAN:  Venancio was seen today for arm pain.    Diagnoses and all orders for this visit:    Paresthesia of left arm  -     EKG 12-lead, tracing only  -     Spine  Referral; Future    Cervical radiculopathy  -     Spine  Referral; Future    Thumb weakness  -     Spine  Referral; Future    Given patient's history of heart disease and other risk factors EKG was obtained which does not show any acute signs of ischemia or changes.  His symptoms are not instigated with exertion that is not related to the left arm.  He does have some notable weakness of the thumb on the left hand compared to the right and does seem to have some weaker left hand  strength.  No other deficit noticed.  Spurling's test positive.  Concern for cervical radiculopathy with myelopathy and will get a urgent referral to spine specialty for further evaluation and treatment.  Discussed starting empiric prednisone however patient's pain is not severe and will hold off until he seen by spine later this week  Discussed with him the concern for heart causes and when to present to the emergency room.    Modesto Khan PA-C      SUBJECTIVE:  Venancio is a 63 year old male with a past medical history of type 2 diabetes, hypertension, MI, heart failure among others who presents to urgent care with about 4 days of left arm pain and some slight numbness.  It seems to start around the armpit or back of the arm and travels down into his hand.  We will also go into the shoulder blade.  Denies any recent injuries or increase use of his hands.  Is right-handed.  Feels weaker in the left hand.  He explains that he has had difficulty opening pop cans with the left fingers.  The pain is more of the back and middle of the arm.  The pain comes and goes  Does not necessarily seem to be  associated with exertion  No shortness of breath or chest pain no lightheadedness or sweating.    ROS: Pertinent ROS neg other than the symptoms noted above in the HPI.     OBJECTIVE:  BP (!) 144/75   Pulse 94   Temp 97.9  F (36.6  C) (Oral)   Resp 16   SpO2 95%    GENERAL: healthy, alert and no distress  EYES: Eyes grossly normal to inspection, PERRL and conjunctivae and sclerae normal  NECK: no adenopathy, nontender, no midline tenderness  RESP: lungs clear to auscultation - no rales, rhonchi or wheezes  CV: regular rate and rhythm, normal S1 S2, no S3 or S4, no murmur, click or rub, no peripheral edema and peripheral pulses strong  MS: no gross musculoskeletal defects noted, no edema, no significant tenderness of the forearm or bicep.  Mild tenderness in the supra spinatus  SKIN: no suspicious lesions or rashes  NEURO: Normal strength and tone, mentation intact and speech normal, weakness of the left  and thumb flexion and abduction compared to the right side.  No other focal deficits.  Spurling's test positive, Christy's negative, brachioradialis, biceps and triceps reflex intact bilaterally    DIAGNOSTICS  EKG - Reviewed and interpreted by me normal sinus rhythm, no ST depressions or elevations.  No significant change from 2022  Results for orders placed or performed in visit on 07/17/23   EKG 12-lead, tracing only     Status: None   Result Value Ref Range    Systolic Blood Pressure  mmHg    Diastolic Blood Pressure  mmHg    Ventricular Rate 71 BPM    Atrial Rate 71 BPM    ID Interval 164 ms    QRS Duration 86 ms     ms    QTc 472 ms    P Axis 72 degrees    R AXIS 48 degrees    T Axis 70 degrees    Interpretation ECG       Sinus rhythm  Normal ECG  When compared with ECG of 06-JUN-2022 15:41,  QT has lengthened  Confirmed by KALI MCDONALD MD LOC:[facility] ([id number]) on 7/17/2023 4:07:42 PM          Current Outpatient Medications   Medication     aspirin (ASA) 81 MG chewable tablet     coenzyme Q-10  capsule     empagliflozin (JARDIANCE) 10 MG TABS tablet     fish oil-omega-3 fatty acids 1000 MG capsule     gemfibrozil (LOPID) 600 MG tablet     lisinopril (ZESTRIL) 20 MG tablet     metoprolol succinate ER (TOPROL XL) 100 MG 24 hr tablet     rosuvastatin (CRESTOR) 20 MG tablet     spironolactone (ALDACTONE) 25 MG tablet     nitroGLYcerin (NITROSTAT) 0.4 MG sublingual tablet     No current facility-administered medications for this visit.      Patient Active Problem List   Diagnosis     Essential hypertension, benign     Coronary artery disease involving native coronary artery of native heart without angina pectoris     Hyperlipidemia, unspecified hyperlipidemia type     ANISA (obstructive sleep apnea)     NYHA class 3 heart failure with preserved ejection fraction (H)     Acute myocardial infarction (H)     Alcohol dependence in remission (H)     Other primary cardiomyopathies     Primary hypercholesterolemia      Past Medical History:   Diagnosis Date     Tobacco use disorder 9/8/2006     Past Surgical History:   Procedure Laterality Date     CARDIAC CATHETERIZATION  2014     CARDIAC CATHETERIZATION  2015     CARDIAC CATHETERIZATION  05/04/2018     CORONARY STENT PLACEMENT      rca and lad     CV CORONARY ANGIOGRAM N/A 5/4/2018    Procedure: Coronary Angiogram;  Surgeon: Frank Wilburn MD;  Location: [facility];  Service:      CV CORONARY ANGIOGRAM N/A 2/25/2022    Procedure: Coronary Angiogram;  Surgeon: Nona George MD;  Location: [facility] CATH LAB CV     CV LEFT HEART CATH N/A 2/25/2022    Procedure: Left Heart Cath;  Surgeon: Nona George MD;  Location: [facility] CATH LAB CV     CV LEFT HEART CATHETERIZATION WITH LEFT VENTRICULOGRAM N/A 5/4/2018    Procedure: Left Heart Catheterization with Left Ventriculogram;  Surgeon: Frank Wilburn MD;  Location: [facility] Cath Lab;  Service:      HERNIA REPAIR       SURGICAL HISTORY OF -   1993    Traumatic amputation of the left 4th  fingertip with repair.     SURGICAL HISTORY OF -   2000    Right inguinal hernia with high ligation of indirect hernia sac and placement of Prolene mesh     Family History   Problem Relation Age of Onset     Hypertension Mother      Cerebrovascular Disease Mother      Cardiovascular Mother         Marfan's Syndrome     Respiratory Father         emphesema     Snoring Father      Asthma Sister      Cardiovascular Sister         aortic aneurism Marfan's syndrome.     Social History     Tobacco Use     Smoking status: Former     Packs/day: 1.00     Years: 25.00     Pack years: 25.00     Types: Cigarettes     Quit date:      Years since quittin.5     Smokeless tobacco: Never   Substance Use Topics     Alcohol use: No              The plan of care was discussed with the patient. They understand and agree with the course of treatment prescribed. A printed summary was given including instructions and medications.  The use of Dragon/Cono-C dictation services may have been used to construct the content in this note; any grammatical or spelling errors are non-intentional. Please contact the author of this note directly if you are in need of any clarification.

## 2023-07-24 DIAGNOSIS — E78.5 HYPERLIPIDEMIA, UNSPECIFIED HYPERLIPIDEMIA TYPE: ICD-10-CM

## 2023-07-24 DIAGNOSIS — I10 ESSENTIAL HYPERTENSION, BENIGN: ICD-10-CM

## 2023-07-25 RX ORDER — METOPROLOL SUCCINATE 100 MG/1
100 TABLET, EXTENDED RELEASE ORAL DAILY
Qty: 90 TABLET | Refills: 3 | Status: SHIPPED | OUTPATIENT
Start: 2023-07-25 | End: 2024-07-22

## 2023-07-25 RX ORDER — ROSUVASTATIN CALCIUM 20 MG/1
20 TABLET, COATED ORAL DAILY
Qty: 90 TABLET | Refills: 3 | Status: SHIPPED | OUTPATIENT
Start: 2023-07-25 | End: 2024-07-22

## 2023-08-01 ENCOUNTER — OFFICE VISIT (OUTPATIENT)
Dept: PHYSICAL MEDICINE AND REHAB | Facility: CLINIC | Age: 63
End: 2023-08-01
Attending: PHYSICIAN ASSISTANT
Payer: COMMERCIAL

## 2023-08-01 DIAGNOSIS — M54.12 CERVICAL RADICULOPATHY: ICD-10-CM

## 2023-08-01 DIAGNOSIS — R29.898 LEFT HAND WEAKNESS: Primary | ICD-10-CM

## 2023-08-01 DIAGNOSIS — R20.2 PARESTHESIA OF LEFT ARM: ICD-10-CM

## 2023-08-01 DIAGNOSIS — R29.898 THUMB WEAKNESS: ICD-10-CM

## 2023-08-01 PROCEDURE — 99203 OFFICE O/P NEW LOW 30 MIN: CPT | Performed by: NURSE PRACTITIONER

## 2023-08-01 NOTE — LETTER
8/1/2023         RE: Venancio Tolentino  [address] [facility] [address]        Dear Colleague,    Thank you for referring your patient, Venancio Tolentino, to the [facility]. Please see a copy of my visit note below.    ASSESSMENT: Venancio Tolentino is a 63 year old male presents for consultation at the request of PCP Arjun Escalante, who presents today for new patient evaluation of :     -Left hand weakness    Yifan has weakness in his left hand , intrinsics and extensors and thumb on exam today, otherwise neuro intact.  His previous left arm numbness and pain is now gone.  We talked about possible sources, including cervical spine or peripheral neuropathy, less likely the brain.  I feel like this is less likely because he had pain in the shoulder and arm in addition to numbness at onset of symptoms, and no face or leg symptoms. I would recommend doing an EMG of his left arm for further evaluation.  Hopefully since he has had symptoms for a few weeks, the results will be accurate.  As symptoms are overall improving, we will add some physical and occupational therapy for his and to see if he can build strength.  We will review EMG results once complete and decide plan, whether it is additional imaging or referral to see neurologist.  I do not see benefit to starting any medications today given that he has no pain and no numbness.  Patient was satisfied with treatment plan        8/1/2023     9:02 AM   OSWESTRY DISABILITY INDEX   Count 8   Sum 0   Oswestry Score (%) 0 %            Diagnoses and all orders for this visit:  Left hand weakness  -     Occupational Therapy Referral; Future  -     EMG; Future  -     Physical Therapy Referral; Future  Paresthesia of left arm  -     Spine  Referral  Cervical radiculopathy  -     Spine  Referral  Thumb weakness  -     Spine  Referral       PLAN:  Reviewed spine anatomy and disease process. Discussed  diagnosis and treatment options with the patient today. A shared decision making model was used. The patient's values and choices were respected. The following represents what was discussed and decided upon by the provider and the patient.     -DIAGNOSTIC TESTS:  Images were personally reviewed and interpreted and explained to patient today using spine model.   -- Left upper extremity EMG was ordered today    -PHYSICAL THERAPY:    -Physical therapy and Occupational Therapy was ordered today  Discussed the importance of core strengthening, ROM, stretching exercises with the patient and how each of these entities is important in decreasing pain.  Explained to the patient that the purpose of physical therapy is to teach the patient a home exercise program.  These exercises need to be performed every day in order to decrease pain and prevent future occurrences of pain.    -MEDICATIONS:    -No medications were offered today    -INTERVENTIONS:    We did not discuss any potential for injections at this time.    -PATIENT EDUCATION: Total time of 30 minutes, on the day of service, spent with the patient, reviewing the chart, placing orders, and documenting.   -Today we also discussed the issues related to the pros and cons of the current treatment plan.    -FOLLOW-UP:   Call for results of EMG.    Advised patient to call the Spine Center if symptoms worsen or if they develop red flag symptoms such as numbness, weakness, severe pain uncontrolled by current pain med regimen, or any new or worsening problems controlling bladder and bowel function.   ______________________________________________________________________    SUBJECTIVE:   Venancio YOUNG Cosamuel  is a 63 year old male history of A-fib, heart attack/coronary artery disease, and acid reflux on aspirin 81 mg who presents today for new patient evaluation of left arm pain and left hand weakness.    Yifan explains about 2 or 3 weeks ago he developed atraumatic pain and numbness  in the left shoulder going down into the arm and into all fingers on his left hand.  The pain and numbness resolved after several days, but he now has weakness in his left hand.  Particularly difficult to grasp things with pincer grasp on the left.  He denies any neck pain, headache, dizziness, nausea or vomiting, change in vision, right arm symptoms, confusion or mood changes, numbness in the face or leg, change in coordination or balance, or change in bowel or bladder control.  He has never had this occur before.     He has not tried any medications for his symptoms so far.        Current Outpatient Medications   Medication     aspirin (ASA) 81 MG chewable tablet     coenzyme Q-10 capsule     empagliflozin (JARDIANCE) 10 MG TABS tablet     fish oil-omega-3 fatty acids 1000 MG capsule     gemfibrozil (LOPID) 600 MG tablet     lisinopril (ZESTRIL) 20 MG tablet     metoprolol succinate ER (TOPROL XL) 100 MG 24 hr tablet     nitroGLYcerin (NITROSTAT) 0.4 MG sublingual tablet     rosuvastatin (CRESTOR) 20 MG tablet     spironolactone (ALDACTONE) 25 MG tablet     No current facility-administered medications for this visit.       No Known Allergies    Past Medical History:   Diagnosis Date     Tobacco use disorder 9/8/2006        Patient Active Problem List   Diagnosis     Essential hypertension, benign     Coronary artery disease involving native coronary artery of native heart without angina pectoris     Hyperlipidemia, unspecified hyperlipidemia type     ANISA (obstructive sleep apnea)     NYHA class 3 heart failure with preserved ejection fraction (H)     Acute myocardial infarction (H)     Alcohol dependence in remission (H)     Other primary cardiomyopathies     Primary hypercholesterolemia       Past Surgical History:   Procedure Laterality Date     CARDIAC CATHETERIZATION  2014     CARDIAC CATHETERIZATION  2015     CARDIAC CATHETERIZATION  05/04/2018     CORONARY STENT PLACEMENT      rca and lad     CV CORONARY  ANGIOGRAM N/A 5/4/2018    Procedure: Coronary Angiogram;  Surgeon: Frank Wilburn MD;  Location: [facility] Cath Lab;  Service:      CV CORONARY ANGIOGRAM N/A 2/25/2022    Procedure: Coronary Angiogram;  Surgeon: Nona George MD;  Location: [facility] CATH LAB CV     CV LEFT HEART CATH N/A 2/25/2022    Procedure: Left Heart Cath;  Surgeon: Nona George MD;  Location: [facility] CATH LAB CV     CV LEFT HEART CATHETERIZATION WITH LEFT VENTRICULOGRAM N/A 5/4/2018    Procedure: Left Heart Catheterization with Left Ventriculogram;  Surgeon: Frank Wilburn MD;  Location: [facility] Cath Lab;  Service:      HERNIA REPAIR       SURGICAL HISTORY OF -   1993    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     SURGICAL HISTORY OF -   7/7/2000    Right inguinal hernia with high ligation of indirect hernia sac and placement of Prolene mesh       Family History   Problem Relation Age of Onset     Hypertension Mother      Cerebrovascular Disease Mother      Cardiovascular Mother         Marfan's Syndrome     Respiratory Father         emphesema     Snoring Father      Asthma Sister      Cardiovascular Sister         aortic aneurism Marfan's syndrome.       Reviewed past medical, surgical, and family history with patient found on new patient intake packet located in EMR Media tab.     SOCIAL HX: Non-smoker, no alcohol use, no recreational drug use    Oswestry (ALLAN) Questionnaire:        8/1/2023     9:02 AM   OSWESTRY DISABILITY INDEX   Count 8   Sum 0   Oswestry Score (%) 0 %       Neck Disability Index:      8/1/2023     9:07 AM   Neck Disability Index (  Alexis H. and Callum C. 1991. All rights reserved.; used with permission)   SECTION 1 - PAIN INTENSITY 0   SECTION 2 - PERSONAL CARE 0   SECTION 3 - LIFTING 0   SECTION 4 - READING 0   SECTION 5 - HEADACHES 0   SECTION 6 - CONCENTRATION 0   SECTION 7 - WORK 0   SECTION 8 - DRIVING 0   SECTION 10 - RECREATION 0   Count 9   Sum 0   Raw Score:  /50 0   Neck Disability Index Score: (%) 0 %          PHQ-2 Score:       4/18/2023     7:14 PM 11/8/2022    10:53 PM   PHQ-2 ( 1999 [employer])   Q1: Little interest or pleasure in doing things 0 0   Q2: Feeling down, depressed or hopeless 0 0   PHQ-2 Score 0 0   Q1: Little interest or pleasure in doing things Not at all Not at all   Q2: Feeling down, depressed or hopeless Not at all Not at all   PHQ-2 Score 0 0          ROS: Positive for joint pain only.  Specifically negative for bowel/bladder dysfunction, balance changes, headache, dizziness, foot drop, fevers, chills, appetite changes, nausea/vomiting, unexplained weight loss. Otherwise 13 systems reviewed are negative. Please see the patient's intake questionnaire from today for details.      Answers submitted by the patient for this visit:  Symptoms you have experienced in the last 30 days (Submitted on 8/1/2023)  General Symptoms: No  Skin Symptoms: No  HENT Symptoms: No  EYE SYMPTOMS: No  HEART SYMPTOMS: No  LUNG SYMPTOMS: No  INTESTINAL SYMPTOMS: No  URINARY SYMPTOMS: No  REPRODUCTIVE SYMPTOMS: No  SKELETAL SYMPTOMS: No  BLOOD SYMPTOMS: No  NERVOUS SYSTEM SYMPTOMS: No  MENTAL HEALTH SYMPTOMS: No      OBJECTIVE:  There were no vitals taken for this visit.    PHYSICAL EXAMINATION:  --CONSTITUTIONAL: Vital signs as above. No acute distress. The patient is well nourished and well groomed.  --PSYCHIATRIC: The patient is awake, alert, oriented to person, place, and time, and answering questions appropriately with clear speech. Appropriate mood and affect   --HEENT: Sclera are non-injected. Extraocular muscles are intact. Moist oral mucosa.  --SKIN: Skin over the face, bilateral upper extremities, and posterior torso is clean, dry, intact without rashes.  --RESPIRATORY: Normal rhythm and effort. No abnormal accessory muscle breathing patterns noted.     --NEUROLOGIC: CN III-XII are grossly intact.   --GROSS MOTOR: Easily arises from a seated position. Toe walking,  heel walking, and tandem gait are normal.      --UPPER EXTREMITY MOTOR TESTING:  Shoulder abduction: right 5/5, left 5/5  Triceps: right 5/5 left 5/5  Biceps:right 5/5  left 5/5,   Wrist flexion: right 5/5  left 5/5,   Wrist extension: right 5/5  left 5/5,   Hand :  right 5/5  left 4+/5,   Intrinsics: right 5/5  left 4/5,   Extensors: right 5/5  left 4/5,   Thumb abduction and adduction right 5/5, left 4/5    --LOWER EXTREMITY MOTOR TESTING  Hip flexion: right 5/5  left 5/5,   Quads:right 5/5  left 5/5,   Hamstrings: right 5/5  left 5/5,   Dorsiflexion: right 5/5  left 5/5,   Plantarflexion: right 5/5  left 5/5,   EHL: right 5/5  left 5/5,     REFLEXES: 1/4 symmetric triceps, biceps, brachioradialis bilaterally. 2/4 symmetric patellar, achilles reflex bilaterally.  Negative Clonus, Babinski, and Manriquez's bilaterally.      SENSATION: of the upper and lower extremities is intact to light touch.   --VASCULAR:. Warm upper and lower limbs bilaterally. Capillary refill in the upper and lower extremities is less than 1 second.    --CERVICAL SPINE: Inspection reveals no evidence of deformity or swelling. Range of motion is not limited in cervical flexion, extension, lateral rotation, head tilt. No point tenderness to palpation of cervical spine. No tenderness to palpation of traps, scaps, or paraspinal musculature.    --SHOULDERS: No tenderness to palpation or swelling noted of AC joint.    --WRISTS: Full range of motion bilaterally      IMAGING:  Spine imaging was personally reviewed and interpreted today. The images were shown to the patient and the findings were explained using a spine model.     No results found.       Daisy Ybarra [facility]  [facility]  O. [phone]        Again, thank you for allowing me to participate in the care of your patient.        Sincerely,        KATELYN Cast CNP

## 2023-08-01 NOTE — PROGRESS NOTES
ASSESSMENT: Venancio Tolentino is a 63 year old male presents for consultation at the request of PCP Arjun Escalante, who presents today for new patient evaluation of :     -Left hand weakness    Yifan has weakness in his left hand , intrinsics and extensors and thumb on exam today, otherwise neuro intact.  His previous left arm numbness and pain is now gone.  We talked about possible sources, including cervical spine or peripheral neuropathy, less likely the brain.  I feel like this is less likely because he had pain in the shoulder and arm in addition to numbness at onset of symptoms, and no face or leg symptoms. I would recommend doing an EMG of his left arm for further evaluation.  Hopefully since he has had symptoms for a few weeks, the results will be accurate.  As symptoms are overall improving, we will add some physical and occupational therapy for his and to see if he can build strength.  We will review EMG results once complete and decide plan, whether it is additional imaging or referral to see neurologist.  I do not see benefit to starting any medications today given that he has no pain and no numbness.  Patient was satisfied with treatment plan        8/1/2023     9:02 AM   OSWESTRY DISABILITY INDEX   Count 8   Sum 0   Oswestry Score (%) 0 %            Diagnoses and all orders for this visit:  Left hand weakness  -     Occupational Therapy Referral; Future  -     EMG; Future  -     Physical Therapy Referral; Future  Paresthesia of left arm  -     Spine  Referral  Cervical radiculopathy  -     Spine  Referral  Thumb weakness  -     Spine  Referral       PLAN:  Reviewed spine anatomy and disease process. Discussed diagnosis and treatment options with the patient today. A shared decision making model was used. The patient's values and choices were respected. The following represents what was discussed and decided upon by the provider and the patient.     -DIAGNOSTIC TESTS:   Images were personally reviewed and interpreted and explained to patient today using spine model.   -- Left upper extremity EMG was ordered today    -PHYSICAL THERAPY:    -Physical therapy and Occupational Therapy was ordered today  Discussed the importance of core strengthening, ROM, stretching exercises with the patient and how each of these entities is important in decreasing pain.  Explained to the patient that the purpose of physical therapy is to teach the patient a home exercise program.  These exercises need to be performed every day in order to decrease pain and prevent future occurrences of pain.    -MEDICATIONS:    -No medications were offered today    -INTERVENTIONS:    We did not discuss any potential for injections at this time.    -PATIENT EDUCATION: Total time of 30 minutes, on the day of service, spent with the patient, reviewing the chart, placing orders, and documenting.   -Today we also discussed the issues related to the pros and cons of the current treatment plan.    -FOLLOW-UP:   Call for results of EMG.    Advised patient to call the Spine Center if symptoms worsen or if they develop red flag symptoms such as numbness, weakness, severe pain uncontrolled by current pain med regimen, or any new or worsening problems controlling bladder and bowel function.   ______________________________________________________________________    SUBJECTIVE:   Venancio Tolentino  is a 63 year old male history of A-fib, heart attack/coronary artery disease, and acid reflux on aspirin 81 mg who presents today for new patient evaluation of left arm pain and left hand weakness.    Yifan explains about 2 or 3 weeks ago he developed atraumatic pain and numbness in the left shoulder going down into the arm and into all fingers on his left hand.  The pain and numbness resolved after several days, but he now has weakness in his left hand.  Particularly difficult to grasp things with pincer grasp on the left.  He denies any  neck pain, headache, dizziness, nausea or vomiting, change in vision, right arm symptoms, confusion or mood changes, numbness in the face or leg, change in coordination or balance, or change in bowel or bladder control.  He has never had this occur before.     He has not tried any medications for his symptoms so far.        Current Outpatient Medications   Medication    aspirin (ASA) 81 MG chewable tablet    coenzyme Q-10 capsule    empagliflozin (JARDIANCE) 10 MG TABS tablet    fish oil-omega-3 fatty acids 1000 MG capsule    gemfibrozil (LOPID) 600 MG tablet    lisinopril (ZESTRIL) 20 MG tablet    metoprolol succinate ER (TOPROL XL) 100 MG 24 hr tablet    nitroGLYcerin (NITROSTAT) 0.4 MG sublingual tablet    rosuvastatin (CRESTOR) 20 MG tablet    spironolactone (ALDACTONE) 25 MG tablet     No current facility-administered medications for this visit.       No Known Allergies    Past Medical History:   Diagnosis Date    Tobacco use disorder 9/8/2006        Patient Active Problem List   Diagnosis    Essential hypertension, benign    Coronary artery disease involving native coronary artery of native heart without angina pectoris    Hyperlipidemia, unspecified hyperlipidemia type    ANISA (obstructive sleep apnea)    NYHA class 3 heart failure with preserved ejection fraction (H)    Acute myocardial infarction (H)    Alcohol dependence in remission (H)    Other primary cardiomyopathies    Primary hypercholesterolemia       Past Surgical History:   Procedure Laterality Date    CARDIAC CATHETERIZATION  2014    CARDIAC CATHETERIZATION  2015    CARDIAC CATHETERIZATION  05/04/2018    CORONARY STENT PLACEMENT      [facility]    CV CORONARY ANGIOGRAM N/A 5/4/2018    Procedure: Coronary Angiogram;  Surgeon: Frank Wilburn MD;  Location: [facility] Cath Lab;  Service:     CV CORONARY ANGIOGRAM N/A 2/25/2022    Procedure: Coronary Angiogram;  Surgeon: Nona George MD;  Location: [facility] CATH LAB CV    CV LEFT HEART  CATH N/A 2/25/2022    Procedure: Left Heart Cath;  Surgeon: Nona George MD;  Location: [facility] CATH LAB CV    CV LEFT HEART CATHETERIZATION WITH LEFT VENTRICULOGRAM N/A 5/4/2018    Procedure: Left Heart Catheterization with Left Ventriculogram;  Surgeon: Frank Wilburn MD;  Location: [facility] Cath Lab;  Service:     HERNIA REPAIR      SURGICAL HISTORY OF -   1993    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    SURGICAL HISTORY OF -   7/7/2000    Right inguinal hernia with high ligation of indirect hernia sac and placement of Prolene mesh       Family History   Problem Relation Age of Onset    Hypertension Mother     Cerebrovascular Disease Mother     Cardiovascular Mother         Marfan's Syndrome    Respiratory Father         emphesema    Snoring Father     Asthma Sister     Cardiovascular Sister         aortic aneurism Marfan's syndrome.       Reviewed past medical, surgical, and family history with patient found on new patient intake packet located in EMR Media tab.     SOCIAL HX: Non-smoker, no alcohol use, no recreational drug use    Oswestry (ALLAN) Questionnaire:        8/1/2023     9:02 AM   OSWESTRY DISABILITY INDEX   Count 8   Sum 0   Oswestry Score (%) 0 %       Neck Disability Index:      8/1/2023     9:07 AM   Neck Disability Index (  Alexis H. and Callum REA. 1991. All rights reserved.; used with permission)   SECTION 1 - PAIN INTENSITY 0   SECTION 2 - PERSONAL CARE 0   SECTION 3 - LIFTING 0   SECTION 4 - READING 0   SECTION 5 - HEADACHES 0   SECTION 6 - CONCENTRATION 0   SECTION 7 - WORK 0   SECTION 8 - DRIVING 0   SECTION 10 - RECREATION 0   Count 9   Sum 0   Raw Score: /50 0   Neck Disability Index Score: (%) 0 %          PHQ-2 Score:       4/18/2023     7:14 PM 11/8/2022    10:53 PM   PHQ-2 ( 1999 [employer])   Q1: Little interest or pleasure in doing things 0 0   Q2: Feeling down, depressed or hopeless 0 0   PHQ-2 Score 0 0   Q1: Little interest or pleasure in doing  things Not at all Not at all   Q2: Feeling down, depressed or hopeless Not at all Not at all   PHQ-2 Score 0 0          ROS: Positive for joint pain only.  Specifically negative for bowel/bladder dysfunction, balance changes, headache, dizziness, foot drop, fevers, chills, appetite changes, nausea/vomiting, unexplained weight loss. Otherwise 13 systems reviewed are negative. Please see the patient's intake questionnaire from today for details.      Answers submitted by the patient for this visit:  Symptoms you have experienced in the last 30 days (Submitted on 8/1/2023)  General Symptoms: No  Skin Symptoms: No  HENT Symptoms: No  EYE SYMPTOMS: No  HEART SYMPTOMS: No  LUNG SYMPTOMS: No  INTESTINAL SYMPTOMS: No  URINARY SYMPTOMS: No  REPRODUCTIVE SYMPTOMS: No  SKELETAL SYMPTOMS: No  BLOOD SYMPTOMS: No  NERVOUS SYSTEM SYMPTOMS: No  MENTAL HEALTH SYMPTOMS: No      OBJECTIVE:  There were no vitals taken for this visit.    PHYSICAL EXAMINATION:  --CONSTITUTIONAL: Vital signs as above. No acute distress. The patient is well nourished and well groomed.  --PSYCHIATRIC: The patient is awake, alert, oriented to person, place, and time, and answering questions appropriately with clear speech. Appropriate mood and affect   --HEENT: Sclera are non-injected. Extraocular muscles are intact. Moist oral mucosa.  --SKIN: Skin over the face, bilateral upper extremities, and posterior torso is clean, dry, intact without rashes.  --RESPIRATORY: Normal rhythm and effort. No abnormal accessory muscle breathing patterns noted.     --NEUROLOGIC: CN III-XII are grossly intact.   --GROSS MOTOR: Easily arises from a seated position. Toe walking, heel walking, and tandem gait are normal.      --UPPER EXTREMITY MOTOR TESTING:  Shoulder abduction: right 5/5, left 5/5  Triceps: right 5/5 left 5/5  Biceps:right 5/5  left 5/5,   Wrist flexion: right 5/5  left 5/5,   Wrist extension: right 5/5  left 5/5,   Hand :  right 5/5  left 4+/5,    Intrinsics: right 5/5  left 4/5,   Extensors: right 5/5  left 4/5,   Thumb abduction and adduction right 5/5, left 4/5    --LOWER EXTREMITY MOTOR TESTING  Hip flexion: right 5/5  left 5/5,   Quads:right 5/5  left 5/5,   Hamstrings: right 5/5  left 5/5,   Dorsiflexion: right 5/5  left 5/5,   Plantarflexion: right 5/5  left 5/5,   EHL: right 5/5  left 5/5,     REFLEXES: 1/4 symmetric triceps, biceps, brachioradialis bilaterally. 2/4 symmetric patellar, achilles reflex bilaterally.  Negative Clonus, Babinski, and Manriquez's bilaterally.      SENSATION: of the upper and lower extremities is intact to light touch.   --VASCULAR:. Warm upper and lower limbs bilaterally. Capillary refill in the upper and lower extremities is less than 1 second.    --CERVICAL SPINE: Inspection reveals no evidence of deformity or swelling. Range of motion is not limited in cervical flexion, extension, lateral rotation, head tilt. No point tenderness to palpation of cervical spine. No tenderness to palpation of traps, scaps, or paraspinal musculature.    --SHOULDERS: No tenderness to palpation or swelling noted of AC joint.    --WRISTS: Full range of motion bilaterally      IMAGING:  Spine imaging was personally reviewed and interpreted today. The images were shown to the patient and the findings were explained using a spine model.     No results found.       Daisy Ybarra FNP-C  [facility]  O. [phone]

## 2023-08-01 NOTE — PATIENT INSTRUCTIONS
Schedule an EMG (nerve test) with Dr Ruth at the  before you leave today        ~You have been referred for Physical Therapy to [facility]. They will call you to schedule an appointment.      Scheduling phone number is [phone] for [facility], or [place] location.  If you have not heard from the scheduling office within 2 business days, please call [phone] for ALL other locations.    Discussed the importance of core strengthening, ROM, stretching exercises and how each of these entities is important in decreasing pain and improving long term spine health.  The purpose of physical therapy is to teach you an individualized home exercise program.  These exercises need to be performed every day in order to decrease pain and prevent future occurrences of pain.         Call us to discuss your EMG results after you have them done

## 2023-08-11 ENCOUNTER — TELEPHONE (OUTPATIENT)
Dept: CARDIOLOGY | Facility: CLINIC | Age: 63
End: 2023-08-11
Payer: COMMERCIAL

## 2023-08-11 NOTE — TELEPHONE ENCOUNTER
Called patient to review recent elevated blood pressure reading.  Per [place] Community Measures guidelines, patients blood pressure is out of parameters and recheck blood pressure is recommended.    Last Blood Pressure: 144/75  Last Heart Rate: 94  Date: 7/17/23  Location: Other Specialty    Patient state he is at work right now but does have a wrist blood pressure cuff at home and can check his bp once he gets home. Provided east region number to call back and provide blood pressure reading once he takes his bp.

## 2023-08-15 NOTE — TELEPHONE ENCOUNTER
Patient returned call and left voicemail message with update blood pressure reading.      Last Blood Pressure: 144/75  Last Heart Rate: 94  Date: 7/17/23  Location: Other Specialty    Today's Blood Pressure: 129/76  Location: Home BP    Patient reported blood pressure updated in Epic. Blood pressure falls within [place] Community Measures guidelines.  Patient will follow up as previously advised.

## 2023-09-13 ENCOUNTER — TELEPHONE (OUTPATIENT)
Dept: PHYSICAL MEDICINE AND REHAB | Facility: CLINIC | Age: 63
End: 2023-09-13

## 2023-09-13 ENCOUNTER — OFFICE VISIT (OUTPATIENT)
Dept: PHYSICAL MEDICINE AND REHAB | Facility: CLINIC | Age: 63
End: 2023-09-13
Attending: NURSE PRACTITIONER
Payer: COMMERCIAL

## 2023-09-13 DIAGNOSIS — R29.898 LEFT HAND WEAKNESS: Primary | ICD-10-CM

## 2023-09-13 DIAGNOSIS — R29.898 LEFT HAND WEAKNESS: ICD-10-CM

## 2023-09-13 DIAGNOSIS — M54.12 CERVICAL RADICULOPATHY AT C8: ICD-10-CM

## 2023-09-13 DIAGNOSIS — M54.12 CERVICAL RADICULOPATHY: ICD-10-CM

## 2023-09-13 PROCEDURE — 95886 MUSC TEST DONE W/N TEST COMP: CPT | Performed by: PHYSICAL MEDICINE & REHABILITATION

## 2023-09-13 PROCEDURE — 95910 NRV CNDJ TEST 7-8 STUDIES: CPT | Performed by: PHYSICAL MEDICINE & REHABILITATION

## 2023-09-13 NOTE — LETTER
9/13/2023         RE: Venancio Tolentino  [address] [facility] [address]        Dear Colleague,    Thank you for referring your patient, Venancio Tolentino, to the [facility]. Please see a copy of my visit note below.    [facility]  [address] [place] [address] [address]  [place] [address]  Office: [phone] Fax: [phone]    Electromyography and Nerve Conduction Study Report        Indication: Patient presents at the request of Daisy Ybarra for left upper extremity EMG.  He has left-sided neck pain with left arm pain and paresthesias intermittently to the hand.  Weakness of .  He is right-handed.  On exam he has normal sensation to light touch of the upper extremities, normal reflexes of the upper extremities negative Christy's, 5 -/5 strength left hand interosseous and finger flexors with 5/5 shoulder abductors, elbow flexors and extensors and wrist extensors.      Pt Exam Discussion (Communication Barriers):  Electromyography and nerve conduction testing, including associated discomfort, risks, benefits, and alternatives was discussed with the patient prior to the procedure.  No learning/ communication barriers; patient verbalized understanding of procedure.  Informed consent was obtained.           Pt Assessment:  Testing was successfully completed; patient tolerated testing well.       Blood Thinners: ASA Skin Temperature: Warmed 32.1                     EMG/NCS  results:     Nerve Conduction Studies  Motor Sites      Segment Distal Latency Neg. Amp CV F-Latency F-Estimate Comment   Site  (ms) mV m/s ms ms    Left Median (APB) Motor   Wrist Wrist-APB 4.0 4.3       Elbow Elbow-Wrist 8.6 4.3 49      Left Ulnar (ADM) Motor   Wrist  2.9 9.3       Bel Elbow Bel Elbow-Wrist 7.0 8.8 55      Ab Elbow Ab Elbow-Wrist 8.9 8.4 55        Sensory Sites      Onset Lat Peak Lat Amp CV Comment   Site (ms) (ms)  V m/s    Left Median Anti Sensory    Wrist-Dig II 2.4 3.1 29 54    Left Median-Ulnar Palmar Sensory        Median   Palm-Wrist 1.33 1.85 86 60         Ulnar   Palm-Wrist 1.33 1.85 18 60    Left Radial Sensory   Forearm-Wrist 1.40 1.98 42 71    Left Ulnar Anti Sensory   Wrist-Dig V 1.95 2.7 23 56        NCS Waveforms:    Motor         Sensory                  Electromyography     Side Muscle Nerve Root Ins Act Fibs Psw Fasc Recrt Dur Amp Poly Comment   Left Deltoid Axillary C5-6 Nml Nml Nml Nml Nml Nml Nml 0    Left Triceps Radial C6-7-8 Nml Nml Nml Nml Nml Nml Nml 0    Left PronatorTeres Median C6-7 Nml Nml Nml Nml Nml Nml Nml 0    Left 1stDorInt Ulnar C8-T1 *Incr *1+ *1+ Nml *Rapid Nml Nml 0    Left Abd Poll Brev Median C8-T1 *Incr *2+ *2+ Nml Nml Nml Nml 0    Left FlexCarpiUln2 Ulnar C8,T1 *Incr *2+ *2+ Nml *Rapid Nml Nml 0    Left Ext Indicis2 Radial (Post Int) C7-8 *Incr *1+ *1+ Nml Nml Nml Nml 0          Comment NCS: Normal study  1.  Normal nerve conduction studies left upper extremity.  This includes normal left median, ulnar, radial SNAPs, median and ulnar transcarpal studies, and median and ulnar CMAP's.    Comment EMG: Abnormal study  1.  Increased insertional activity the positive waves and fibrillation potentials of the left FDI, APB, FCU and EIP with slightly reduced recruitment of the FDI and FCU.  Remainder left upper extremity needle EMG normal.    Interpretation: Abnormal study: There is electrodiagnostic evidence of:    1.  Left C8 and/or T1 radiculopathy active versus lower trunk brachial plexopathy.  Brachial plexopathy would be considered less likely given normal nerve conduction studies.      2.  There is no electrodiagnostic evidence of or focal neuropathy in the left upper extremity.    The testing was completed in its entirety by the physician.       It was our pleasure caring for your patient today, if there any questions or concerns please do not hesitate to contact us.    Again, thank you for allowing me to participate in  the care of your patient.        Sincerely,        Basilio Ruth, DO

## 2023-09-13 NOTE — PATIENT INSTRUCTIONS
Thank you for choosing the [facility] for your EMG testing.    The ordering provider will receive your final EMG results within the next few days.  Please follow up with your provider for the results and further treatment recommendations.

## 2023-09-13 NOTE — PROGRESS NOTES
[facility]  [address] [place] [address] [address]  [place] [address]  Office: [phone] Fax: [phone]    Electromyography and Nerve Conduction Study Report        Indication: Patient presents at the request of Daisy Ybarra for left upper extremity EMG.  He has left-sided neck pain with left arm pain and paresthesias intermittently to the hand.  Weakness of .  He is right-handed.  On exam he has normal sensation to light touch of the upper extremities, normal reflexes of the upper extremities negative Christy's, 5 -/5 strength left hand interosseous and finger flexors with 5/5 shoulder abductors, elbow flexors and extensors and wrist extensors.      Pt Exam Discussion (Communication Barriers):  Electromyography and nerve conduction testing, including associated discomfort, risks, benefits, and alternatives was discussed with the patient prior to the procedure.  No learning/ communication barriers; patient verbalized understanding of procedure.  Informed consent was obtained.           Pt Assessment:  Testing was successfully completed; patient tolerated testing well.       Blood Thinners: ASA Skin Temperature: Warmed 32.1                     EMG/NCS  results:     Nerve Conduction Studies  Motor Sites      Segment Distal Latency Neg. Amp CV F-Latency F-Estimate Comment   Site  (ms) mV m/s ms ms    Left Median (APB) Motor   Wrist Wrist-APB 4.0 4.3       Elbow Elbow-Wrist 8.6 4.3 49      Left Ulnar (ADM) Motor   Wrist  2.9 9.3       Bel Elbow Bel Elbow-Wrist 7.0 8.8 55      Ab Elbow Ab Elbow-Wrist 8.9 8.4 55        Sensory Sites      Onset Lat Peak Lat Amp CV Comment   Site (ms) (ms)  V m/s    Left Median Anti Sensory   Wrist-Dig II 2.4 3.1 29 54    Left Median-Ulnar Palmar Sensory        Median   Palm-Wrist 1.33 1.85 86 60         Ulnar   Palm-Wrist 1.33 1.85 18 60    Left Radial Sensory   Forearm-Wrist 1.40 1.98 42 71    Left Ulnar Anti Sensory   Wrist-Dig V 1.95 2.7 23 56         NCS Waveforms:    Motor         Sensory                  Electromyography     Side Muscle Nerve Root Ins Act Fibs Psw Fasc Recrt Dur Amp Poly Comment   Left Deltoid Axillary C5-6 Nml Nml Nml Nml Nml Nml Nml 0    Left Triceps Radial C6-7-8 Nml Nml Nml Nml Nml Nml Nml 0    Left PronatorTeres Median C6-7 Nml Nml Nml Nml Nml Nml Nml 0    Left 1stDorInt Ulnar C8-T1 *Incr *1+ *1+ Nml *Rapid Nml Nml 0    Left Abd Poll Brev Median C8-T1 *Incr *2+ *2+ Nml Nml Nml Nml 0    Left FlexCarpiUln2 Ulnar C8,T1 *Incr *2+ *2+ Nml *Rapid Nml Nml 0    Left Ext Indicis2 Radial (Post Int) C7-8 *Incr *1+ *1+ Nml Nml Nml Nml 0          Comment NCS: Normal study  1.  Normal nerve conduction studies left upper extremity.  This includes normal left median, ulnar, radial SNAPs, median and ulnar transcarpal studies, and median and ulnar CMAP's.    Comment EMG: Abnormal study  1.  Increased insertional activity the positive waves and fibrillation potentials of the left FDI, APB, FCU and EIP with slightly reduced recruitment of the FDI and FCU.  Remainder left upper extremity needle EMG normal.    Interpretation: Abnormal study: There is electrodiagnostic evidence of:    1.  Left C8 and/or T1 radiculopathy active versus lower trunk brachial plexopathy.  Brachial plexopathy would be considered less likely given normal nerve conduction studies.      2.  There is no electrodiagnostic evidence of or focal neuropathy in the left upper extremity.    The testing was completed in its entirety by the physician.       It was our pleasure caring for your patient today, if there any questions or concerns please do not hesitate to contact us.

## 2023-09-13 NOTE — TELEPHONE ENCOUNTER
"Per Daisy Ybarra CNP, \"Please let Yifan know that his EMG results today suggests his hand numbness/weakness is likely related to a pinched nerve in his neck. I would recommend a cervical spine MRI  if his hand weakness has not yet resolved.\"    Pt states things are \"getting a little bit better than it was when I first saw her but the weakness is still present\". Pt willing to proceed with cervical spine MRI. He is aware order will be placed and he will be contacted to schedule.     Pt states in the future it is okay to leave detailed message on his voicemail or connect with him via [employer].             "

## 2023-09-18 ENCOUNTER — PATIENT OUTREACH (OUTPATIENT)
Dept: FAMILY MEDICINE | Facility: CLINIC | Age: 63
End: 2023-09-18
Payer: COMMERCIAL

## 2023-09-18 NOTE — TELEPHONE ENCOUNTER
Patient Quality Outreach    Patient is due for the following:   Hypertension -  Hypertension follow-up visit    Next Steps:   Patient was scheduled for HTN visit on 10/20/23    Type of outreach:    Chart review performed, no outreach needed.      Questions for provider review:    None           Concepcion Dykes MA  Chart routed to Care Team.

## 2023-10-13 ASSESSMENT — ENCOUNTER SYMPTOMS
FEVER: 0
SHORTNESS OF BREATH: 0
SORE THROAT: 0
PALPITATIONS: 0
ARTHRALGIAS: 1
PARESTHESIAS: 0
EYE PAIN: 0
NAUSEA: 0
HEMATURIA: 0
DIZZINESS: 0
FREQUENCY: 0
HEARTBURN: 1
WEAKNESS: 1
NERVOUS/ANXIOUS: 0
HEADACHES: 0
JOINT SWELLING: 0
DYSURIA: 0
ABDOMINAL PAIN: 0
HEMATOCHEZIA: 0
DIARRHEA: 0
MYALGIAS: 0
CONSTIPATION: 0
CHILLS: 0

## 2023-10-14 DIAGNOSIS — I50.30 HEART FAILURE WITH PRESERVED EJECTION FRACTION, NYHA CLASS II (H): ICD-10-CM

## 2023-10-14 DIAGNOSIS — E78.1 HYPERTRIGLYCERIDEMIA: ICD-10-CM

## 2023-10-17 RX ORDER — EMPAGLIFLOZIN 10 MG/1
10 TABLET, FILM COATED ORAL DAILY
Qty: 90 TABLET | Refills: 1 | Status: SHIPPED | OUTPATIENT
Start: 2023-10-17 | End: 2024-04-08

## 2023-10-17 RX ORDER — GEMFIBROZIL 600 MG/1
TABLET, FILM COATED ORAL
Qty: 180 TABLET | Refills: 1 | Status: SHIPPED | OUTPATIENT
Start: 2023-10-17 | End: 2024-05-13

## 2023-10-17 RX ORDER — SPIRONOLACTONE 25 MG/1
12.5 TABLET ORAL DAILY
Qty: 45 TABLET | Refills: 1 | Status: SHIPPED | OUTPATIENT
Start: 2023-10-17 | End: 2024-04-08

## 2023-10-17 NOTE — TELEPHONE ENCOUNTER
----- Message -----  From: Keyonna Hartley MD  Sent: 10/17/2023   7:57 AM CDT  To: Kelsie Lazo RN  Subject: RE: Medication refills                           That would be fine to go ahead and renew.  Thanks.  ----- Message -----  From: Kelsie Lazo RN  Sent: 10/16/2023   3:18 PM CDT  To: Keyonna Hartley MD  Subject: Medication refills                               Dr Hartley,  3 medication refills have been received for this patient that you saw on 5/4/23, and a follow-up appt is ordered for 2024. Ok to refill his Gemfibrozil, Spironolactone, and Jardiance under you?   Thanks,  CATRACHITO Iglesias

## 2023-10-20 ENCOUNTER — OFFICE VISIT (OUTPATIENT)
Dept: FAMILY MEDICINE | Facility: CLINIC | Age: 63
End: 2023-10-20
Attending: PHYSICIAN ASSISTANT
Payer: COMMERCIAL

## 2023-10-20 VITALS
HEART RATE: 64 BPM | DIASTOLIC BLOOD PRESSURE: 68 MMHG | OXYGEN SATURATION: 97 % | WEIGHT: 155 LBS | SYSTOLIC BLOOD PRESSURE: 124 MMHG | BODY MASS INDEX: 25.83 KG/M2 | TEMPERATURE: 98.7 F | RESPIRATION RATE: 14 BRPM | HEIGHT: 65 IN

## 2023-10-20 DIAGNOSIS — I10 ESSENTIAL HYPERTENSION, BENIGN: ICD-10-CM

## 2023-10-20 DIAGNOSIS — F10.21 ALCOHOL DEPENDENCE IN REMISSION (H): ICD-10-CM

## 2023-10-20 DIAGNOSIS — I25.10 CORONARY ARTERY DISEASE INVOLVING NATIVE CORONARY ARTERY OF NATIVE HEART WITHOUT ANGINA PECTORIS: ICD-10-CM

## 2023-10-20 DIAGNOSIS — Z12.5 SCREENING FOR PROSTATE CANCER: ICD-10-CM

## 2023-10-20 DIAGNOSIS — I50.30 NYHA CLASS 3 HEART FAILURE WITH PRESERVED EJECTION FRACTION (H): ICD-10-CM

## 2023-10-20 DIAGNOSIS — I21.B: ICD-10-CM

## 2023-10-20 DIAGNOSIS — G47.33 OSA (OBSTRUCTIVE SLEEP APNEA): ICD-10-CM

## 2023-10-20 DIAGNOSIS — Z00.00 ANNUAL PHYSICAL EXAM: Primary | ICD-10-CM

## 2023-10-20 LAB
ALBUMIN SERPL BCG-MCNC: 4.7 G/DL (ref 3.5–5.2)
ALP SERPL-CCNC: 77 U/L (ref 40–129)
ALT SERPL W P-5'-P-CCNC: 16 U/L (ref 0–70)
ANION GAP SERPL CALCULATED.3IONS-SCNC: 11 MMOL/L (ref 7–15)
AST SERPL W P-5'-P-CCNC: 26 U/L (ref 0–45)
BILIRUB SERPL-MCNC: 1.3 MG/DL
BUN SERPL-MCNC: 15.6 MG/DL (ref 8–23)
CALCIUM SERPL-MCNC: 9.6 MG/DL (ref 8.8–10.2)
CHLORIDE SERPL-SCNC: 98 MMOL/L (ref 98–107)
CHOLEST SERPL-MCNC: 115 MG/DL
CREAT SERPL-MCNC: 0.92 MG/DL (ref 0.67–1.17)
DEPRECATED HCO3 PLAS-SCNC: 26 MMOL/L (ref 22–29)
EGFRCR SERPLBLD CKD-EPI 2021: >90 ML/MIN/1.73M2
ERYTHROCYTE [DISTWIDTH] IN BLOOD BY AUTOMATED COUNT: 12.4 % (ref 10–15)
GLUCOSE SERPL-MCNC: 83 MG/DL (ref 70–99)
HBA1C MFR BLD: 5.2 % (ref 0–5.6)
HCT VFR BLD AUTO: 41.7 % (ref 40–53)
HDLC SERPL-MCNC: 44 MG/DL
HGB BLD-MCNC: 14.8 G/DL (ref 13.3–17.7)
LDLC SERPL CALC-MCNC: 48 MG/DL
MCH RBC QN AUTO: 32.5 PG (ref 26.5–33)
MCHC RBC AUTO-ENTMCNC: 35.5 G/DL (ref 31.5–36.5)
MCV RBC AUTO: 92 FL (ref 78–100)
NONHDLC SERPL-MCNC: 71 MG/DL
PLATELET # BLD AUTO: 269 10E3/UL (ref 150–450)
POTASSIUM SERPL-SCNC: 4 MMOL/L (ref 3.4–5.3)
PROT SERPL-MCNC: 7.7 G/DL (ref 6.4–8.3)
PSA SERPL DL<=0.01 NG/ML-MCNC: 0.55 NG/ML (ref 0–4.5)
RBC # BLD AUTO: 4.55 10E6/UL (ref 4.4–5.9)
SODIUM SERPL-SCNC: 135 MMOL/L (ref 135–145)
TRIGL SERPL-MCNC: 115 MG/DL
WBC # BLD AUTO: 8 10E3/UL (ref 4–11)

## 2023-10-20 PROCEDURE — G0103 PSA SCREENING: HCPCS | Performed by: PHYSICIAN ASSISTANT

## 2023-10-20 PROCEDURE — 99396 PREV VISIT EST AGE 40-64: CPT | Mod: 25 | Performed by: PHYSICIAN ASSISTANT

## 2023-10-20 PROCEDURE — 80061 LIPID PANEL: CPT | Performed by: PHYSICIAN ASSISTANT

## 2023-10-20 PROCEDURE — 90472 IMMUNIZATION ADMIN EACH ADD: CPT | Performed by: PHYSICIAN ASSISTANT

## 2023-10-20 PROCEDURE — 83036 HEMOGLOBIN GLYCOSYLATED A1C: CPT | Performed by: PHYSICIAN ASSISTANT

## 2023-10-20 PROCEDURE — 90678 RSV VACC PREF BIVALENT IM: CPT | Performed by: PHYSICIAN ASSISTANT

## 2023-10-20 PROCEDURE — 99213 OFFICE O/P EST LOW 20 MIN: CPT | Mod: 25 | Performed by: PHYSICIAN ASSISTANT

## 2023-10-20 PROCEDURE — 90471 IMMUNIZATION ADMIN: CPT | Performed by: PHYSICIAN ASSISTANT

## 2023-10-20 PROCEDURE — 90480 ADMN SARSCOV2 VAC 1/ONLY CMP: CPT | Performed by: PHYSICIAN ASSISTANT

## 2023-10-20 PROCEDURE — 90682 RIV4 VACC RECOMBINANT DNA IM: CPT | Performed by: PHYSICIAN ASSISTANT

## 2023-10-20 PROCEDURE — 36415 COLL VENOUS BLD VENIPUNCTURE: CPT | Performed by: PHYSICIAN ASSISTANT

## 2023-10-20 PROCEDURE — 91320 SARSCV2 VAC 30MCG TRS-SUC IM: CPT | Performed by: PHYSICIAN ASSISTANT

## 2023-10-20 PROCEDURE — 85027 COMPLETE CBC AUTOMATED: CPT | Performed by: PHYSICIAN ASSISTANT

## 2023-10-20 PROCEDURE — 80053 COMPREHEN METABOLIC PANEL: CPT | Performed by: PHYSICIAN ASSISTANT

## 2023-10-20 ASSESSMENT — ENCOUNTER SYMPTOMS
MYALGIAS: 0
FEVER: 0
HEMATURIA: 0
SORE THROAT: 0
HEADACHES: 0
DIARRHEA: 0
PARESTHESIAS: 0
HEMATOCHEZIA: 0
DYSURIA: 0
FREQUENCY: 0
JOINT SWELLING: 0
EYE PAIN: 0
ABDOMINAL PAIN: 0
WEAKNESS: 0
SHORTNESS OF BREATH: 0
NERVOUS/ANXIOUS: 0
NAUSEA: 0
CHILLS: 0
CONSTIPATION: 0
HEARTBURN: 1
ARTHRALGIAS: 1
PALPITATIONS: 0
DIZZINESS: 0

## 2023-10-20 NOTE — PROGRESS NOTES
SUBJECTIVE:   CC: Yifan is an 63 year old who presents for preventative health visit.       10/20/2023     3:51 PM   Additional Questions   Roomed by Concepcion Dykes   Accompanied by none       Yifan is a pleasant 63-year-old male who presents to the clinic today for annual physical.  Past medical history significant for hypertension, coronary artery disease, and congestive heart failure.  He is doing well and has no questions or concerns regarding his chronic health.    He is currently on aspirin, Jardiance, lisinopril, metoprolol, Crestor, and spironolactone.    Healthy Habits:     Getting at least 3 servings of Calcium per day:  NO    Bi-annual eye exam:  Yes    Dental care twice a year:  NO    Sleep apnea or symptoms of sleep apnea:  Sleep apnea    Diet:  Regular (no restrictions)    Frequency of exercise:  None    Taking medications regularly:  Yes    Medication side effects:  None      Social History     Tobacco Use    Smoking status: Former     Packs/day: 1.00     Years: 25.00     Additional pack years: 0.00     Total pack years: 25.00     Types: Cigarettes     Quit date:      Years since quittin.8     Passive exposure: Never    Smokeless tobacco: Never   Substance Use Topics    Alcohol use: No             10/13/2023     4:53 PM   Alcohol Use   Prescreen: >3 drinks/day or >7 drinks/week? No       Last PSA:   Prostate Specific Antigen Screen   Date Value Ref Range Status   2023 0.76 0.00 - 4.50 ng/mL Final   10/05/2020 0.5 0.0 - 4.5 ng/mL Final       Reviewed orders with patient. Reviewed health maintenance and updated orders accordingly - Yes  Lab work is in process    Reviewed and updated as needed this visit by clinical staff   Tobacco  Allergies  Meds              Reviewed and updated as needed this visit by Provider                 Past Medical History:   Diagnosis Date    Tobacco use disorder 2006      Past Surgical History:   Procedure Laterality Date    CARDIAC CATHETERIZATION       CARDIAC CATHETERIZATION  2015    CARDIAC CATHETERIZATION  05/04/2018    CORONARY STENT PLACEMENT      [facility]    CV CORONARY ANGIOGRAM N/A 5/4/2018    Procedure: Coronary Angiogram;  Surgeon: Frank Wilburn MD;  Location: [facility] Cath Lab;  Service:     CV CORONARY ANGIOGRAM N/A 2/25/2022    Procedure: Coronary Angiogram;  Surgeon: Nona George MD;  Location: [facility] CATH LAB CV    CV LEFT HEART CATH N/A 2/25/2022    Procedure: Left Heart Cath;  Surgeon: Nona George MD;  Location: [facility] CATH LAB CV    CV LEFT HEART CATHETERIZATION WITH LEFT VENTRICULOGRAM N/A 5/4/2018    Procedure: Left Heart Catheterization with Left Ventriculogram;  Surgeon: Frank Wilburn MD;  Location: [facility] Cath Lab;  Service:     HERNIA REPAIR      SURGICAL HISTORY OF -   1993    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    SURGICAL HISTORY OF -   7/7/2000    Right inguinal hernia with high ligation of indirect hernia sac and placement of Prolene mesh       Review of Systems   Constitutional:  Negative for chills and fever.   HENT:  Negative for congestion, ear pain, hearing loss and sore throat.    Eyes:  Negative for pain and visual disturbance.   Respiratory:  Negative for shortness of breath.    Cardiovascular:  Negative for chest pain, palpitations and peripheral edema.   Gastrointestinal:  Positive for heartburn. Negative for abdominal pain, constipation, diarrhea, hematochezia and nausea.   Genitourinary:  Negative for dysuria, frequency, genital sores, hematuria, impotence, penile discharge and urgency.   Musculoskeletal:  Positive for arthralgias. Negative for joint swelling and myalgias.   Skin:  Negative for rash.   Neurological:  Negative for dizziness, weakness, headaches and paresthesias.   Psychiatric/Behavioral:  Negative for mood changes. The patient is not nervous/anxious.        OBJECTIVE:   /68 (BP Location: Left arm, Patient Position: Sitting, Cuff Size:  "Adult Regular)   Pulse 64   Temp 98.7  F (37.1  C) (Oral)   Resp 14   Ht 1.638 m (5' 4.5\")   Wt 70.3 kg (155 lb)   SpO2 97%   BMI 26.19 kg/m      Physical Exam  Vitals and nursing note reviewed.   Constitutional:       General: He is not in acute distress.     Appearance: Normal appearance. He is well-developed and well-groomed. He is not ill-appearing or toxic-appearing.   HENT:      Head: Normocephalic and atraumatic.      Right Ear: Tympanic membrane, ear canal and external ear normal.      Left Ear: Tympanic membrane, ear canal and external ear normal.      Mouth/Throat:      Lips: Pink.      Mouth: Mucous membranes are moist.      Palate: No mass.      Pharynx: Oropharynx is clear. Uvula midline.      Tonsils: No tonsillar exudate or tonsillar abscesses.   Eyes:      General: Lids are normal.         Right eye: No discharge.         Left eye: No discharge.   Neck:      Trachea: Trachea normal.   Cardiovascular:      Rate and Rhythm: Normal rate and regular rhythm.      Heart sounds: S1 normal and S2 normal. No murmur heard.  Pulmonary:      Effort: Pulmonary effort is normal.      Breath sounds: Normal breath sounds and air entry.   Abdominal:      General: Bowel sounds are normal. There is no distension.      Palpations: Abdomen is soft.      Tenderness: There is no abdominal tenderness. There is no guarding or rebound.   Musculoskeletal:         General: Swelling: .diagm.      Cervical back: Full passive range of motion without pain and neck supple.      Right lower leg: No edema.      Left lower leg: No edema.   Lymphadenopathy:      Cervical: No cervical adenopathy.   Skin:     General: Skin is warm and dry.      Findings: No lesion or rash.   Neurological:      General: No focal deficit present.      Mental Status: He is alert.      Cranial Nerves: No cranial nerve deficit.      Gait: Gait is intact.      Deep Tendon Reflexes:      Reflex Scores:       Patellar reflexes are 2+ on the right side and " 2+ on the left side.  Psychiatric:         Attention and Perception: Attention normal.         Mood and Affect: Mood normal.         Speech: Speech normal.         ASSESSMENT/PLAN:     Annual physical exam  We will check screening labs including a CBC, complete metabolic panel, lipid, PSA, and A1c.  He will update COVID, flu, and RSV vaccine  - CBC with platelets; Future  - Comprehensive metabolic panel (BMP + Alb, Alk Phos, ALT, AST, Total. Bili, TP); Future  - Lipid panel reflex to direct LDL Fasting; Future  - PSA, screen; Future  - Hemoglobin A1c; Future    Essential hypertension, benign  Blood pressure well controlled today at 124/68.  Continue antihypertension medications  - CBC with platelets; Future    Coronary artery disease involving native coronary artery of native heart without angina pectoris  Asymptomatic at this time.  PCI with drug-eluting stent in 2015.  He does have nitroglycerin but has not needed this medication.  He is to continue with lisinopril, metoprolol, spironolactone and Crestor.  We will check a lipid panel today.  Diet and exercise discussed    NYHA a class III heart failure with preserved ejection fraction  He denies any chest pain, shortness of breath, or orthopnea.  Weights are stable.  No lower extremity edema.  Last cardiac echo 2/2022 was stable with an EF of 55 to 60%.  He is to continue with current regimen including Jardiance, metformin, and spironolactone.    ANISA (obstructive sleep apnea)  Continue with CPAP    Alcohol dependence in remission (H)  Currently in remission.    Screening for prostate cancer  We will check PSA  - PSA, screen; Future    Colon cancer screening  Up-to-date on colonoscopy.  Next colonoscopy due 11/25.     Follow-up Visit   Expected date:  Oct 20, 2024 (Approximate)      Follow Up Appointment Details:     Follow-up with whom?: Me    Follow-Up for what?: Adult Preventive    How?: In Person                     Current Outpatient Medications   Medication  "   aspirin (ASA) 81 MG chewable tablet    coenzyme Q-10 capsule    empagliflozin (JARDIANCE) 10 MG TABS tablet    fish oil-omega-3 fatty acids 1000 MG capsule    gemfibrozil (LOPID) 600 MG tablet    lisinopril (ZESTRIL) 20 MG tablet    metoprolol succinate ER (TOPROL XL) 100 MG 24 hr tablet    nitroGLYcerin (NITROSTAT) 0.4 MG sublingual tablet    rosuvastatin (CRESTOR) 20 MG tablet    spironolactone (ALDACTONE) 25 MG tablet     No current facility-administered medications for this visit.           Patient has been advised of split billing requirements and indicates understanding: Yes      COUNSELING:   Reviewed preventive health counseling, as reflected in patient instructions       Regular exercise       Healthy diet/nutrition       Vision screening       Colorectal cancer screening       Prostate cancer screening      BMI:   Estimated body mass index is 26.19 kg/m  as calculated from the following:    Height as of this encounter: 1.638 m (5' 4.5\").    Weight as of this encounter: 70.3 kg (155 lb).   Weight management plan: Discussed healthy diet and exercise guidelines      He reports that he quit smoking about 11 years ago. His smoking use included cigarettes. He has a 25.00 pack-year smoking history. He has never been exposed to tobacco smoke. He has never used smokeless tobacco.            ALFREDO Bermudez [facility]  "

## 2023-10-24 ENCOUNTER — HOSPITAL ENCOUNTER (OUTPATIENT)
Dept: MRI IMAGING | Facility: HOSPITAL | Age: 63
Discharge: HOME | End: 2023-10-24
Attending: NURSE PRACTITIONER | Admitting: NURSE PRACTITIONER
Payer: COMMERCIAL

## 2023-10-24 DIAGNOSIS — M54.12 CERVICAL RADICULOPATHY AT C8: ICD-10-CM

## 2023-10-24 DIAGNOSIS — R29.898 LEFT HAND WEAKNESS: ICD-10-CM

## 2023-10-24 PROCEDURE — 72141 MRI NECK SPINE W/O DYE: CPT

## 2024-04-07 DIAGNOSIS — I50.30 HEART FAILURE WITH PRESERVED EJECTION FRACTION, NYHA CLASS II (H): ICD-10-CM

## 2024-04-08 RX ORDER — EMPAGLIFLOZIN 10 MG/1
10 TABLET, FILM COATED ORAL DAILY
Qty: 90 TABLET | Refills: 0 | Status: SHIPPED | OUTPATIENT
Start: 2024-04-08 | End: 2024-07-10

## 2024-04-08 RX ORDER — SPIRONOLACTONE 25 MG/1
12.5 TABLET ORAL DAILY
Qty: 45 TABLET | Refills: 0 | Status: SHIPPED | OUTPATIENT
Start: 2024-04-08 | End: 2024-07-10

## 2024-04-16 ENCOUNTER — LAB (OUTPATIENT)
Dept: LAB | Facility: CLINIC | Age: 64
End: 2024-04-16
Payer: COMMERCIAL

## 2024-04-16 DIAGNOSIS — E78.5 DYSLIPIDEMIA: ICD-10-CM

## 2024-04-16 PROCEDURE — 80061 LIPID PANEL: CPT

## 2024-04-16 PROCEDURE — 36415 COLL VENOUS BLD VENIPUNCTURE: CPT

## 2024-04-17 LAB
CHOLEST SERPL-MCNC: 115 MG/DL
FASTING STATUS PATIENT QL REPORTED: YES
HDLC SERPL-MCNC: 42 MG/DL
LDLC SERPL CALC-MCNC: 51 MG/DL
NONHDLC SERPL-MCNC: 73 MG/DL
TRIGL SERPL-MCNC: 111 MG/DL

## 2024-04-18 DIAGNOSIS — E78.5 DYSLIPIDEMIA: Primary | ICD-10-CM

## 2024-05-12 DIAGNOSIS — E78.1 HYPERTRIGLYCERIDEMIA: ICD-10-CM

## 2024-05-13 DIAGNOSIS — I10 ESSENTIAL HYPERTENSION, BENIGN: ICD-10-CM

## 2024-05-13 RX ORDER — LISINOPRIL 20 MG/1
20 TABLET ORAL DAILY
Qty: 90 TABLET | Refills: 3 | Status: SHIPPED | OUTPATIENT
Start: 2024-05-13

## 2024-05-13 RX ORDER — GEMFIBROZIL 600 MG/1
TABLET, FILM COATED ORAL
Qty: 180 TABLET | Refills: 3 | Status: SHIPPED | OUTPATIENT
Start: 2024-05-13

## 2024-06-22 ENCOUNTER — OFFICE VISIT (OUTPATIENT)
Dept: FAMILY MEDICINE | Facility: CLINIC | Age: 64
End: 2024-06-22
Payer: COMMERCIAL

## 2024-06-22 VITALS
RESPIRATION RATE: 18 BRPM | WEIGHT: 152.7 LBS | OXYGEN SATURATION: 95 % | HEART RATE: 75 BPM | DIASTOLIC BLOOD PRESSURE: 83 MMHG | SYSTOLIC BLOOD PRESSURE: 126 MMHG | TEMPERATURE: 98.2 F | BODY MASS INDEX: 25.81 KG/M2

## 2024-06-22 DIAGNOSIS — R06.2 WHEEZING: Primary | ICD-10-CM

## 2024-06-22 DIAGNOSIS — J06.9 VIRAL URI WITH COUGH: ICD-10-CM

## 2024-06-22 PROCEDURE — 99213 OFFICE O/P EST LOW 20 MIN: CPT | Performed by: NURSE PRACTITIONER

## 2024-06-22 RX ORDER — ALBUTEROL SULFATE 90 UG/1
2 AEROSOL, METERED RESPIRATORY (INHALATION) ONCE
Status: COMPLETED | OUTPATIENT
Start: 2024-06-22 | End: 2024-06-22

## 2024-06-22 RX ADMIN — ALBUTEROL SULFATE 2 PUFF: 90 INHALANT RESPIRATORY (INHALATION) at 13:30

## 2024-06-22 NOTE — PROGRESS NOTES
Assessment & Plan     Wheezing    - albuterol (PROVENTIL HFA/VENTOLIN HFA) inhaler    Viral URI with cough     -At onset, resolved    Patient with URI approximately 3 weeks ago for about a week requiring usual cough and cold medication.  Has been having up-and-down worsening wheezing and dry cough.  No chest pain.  No fevers.  Some shortness of breath with activity sleep due to frequent cough.    On exam, he does have tight wheezing in the left upper lobe only also evident with forced expiration.  He is a former smoker.  Consider bronchitis, reactive airway, fluid overload from CHF.    Provided 2 puffs from albuterol inhaler and given to patient.  Waited 15 minutes.    After waiting period, patient was able to take several deep breaths without coughing and wheezing was relieved.  Is feeling like symptoms are mostly relieved.    Due to likely cause related to bronchospasm, potentially underlying lung disease related to smoking history, recommend the following-  Recommend using your new inhaler 2 puffs up to 4 times daily as needed for wheezing, chest tightness or frequent coughing.  All of the symptoms can be caused by tight airways.    See your doctor for recheck in a week or 2 or sooner or ASAP here in urgent care or ER if worsening such as shortness of breath not relieved with the inhaler, new fevers or severe wheezing that does not go away with the inhaler.    Instructional handouts provided.            Return in about 1 week (around 6/29/2024).    Nuvia Downing [facility]    Andrews Saha is a 64 year old male who presents to clinic today for the following health issues:  Chief Complaint   Patient presents with    Cough     X 3wks, coughing some mucus (mostly dry cough), some wheezing, no chest pain or SOB, runny nose, no fever, no headaches or dizziness       HPI    Patient with history of hypertension, CAD, history of MI and cardiomyopathy with cough, mostly dry,  starting 3 weeks ago.    Wheezing mostly at night.  Staying the same.  First week had typical URI symptoms and was taking OTC cough/cold medicine for cough/congestion.  Was doing better during the day last week, but has been experiencing persistent dry cough with intermittent wheezing.      Denies chest pain.    Former smoker, quit 15 years ago.  No history of known asthma or other lung issue.          Review of Systems    See HPI.        Objective    /83   Pulse 75   Temp 98.2  F (36.8  C) (Oral)   Resp 18   Wt 69.3 kg (152 lb 11.2 oz)   SpO2 95%   BMI 25.81 kg/m    Physical Exam  Constitutional:       Appearance: Normal appearance.   HENT:      Mouth/Throat:      Mouth: Mucous membranes are moist.   Eyes:      Conjunctiva/sclera: Conjunctivae normal.   Pulmonary:      Breath sounds: Wheezing (Tight, high-pitched wheezing on forced expiration as well as left upper lobe area only.) present. No rhonchi.   Skin:     General: Skin is warm.   Neurological:      Mental Status: He is alert and oriented to person, place, and time.   Psychiatric:         Mood and Affect: Mood normal.

## 2024-06-22 NOTE — PATIENT INSTRUCTIONS
You had faint wheezing today on your exam.    Recommend using your new inhaler 2 puffs up to 4 times daily as needed for wheezing, chest tightness or frequent coughing.  All of the symptoms can be caused by tight airways.    See your doctor for recheck in a week or 2 or sooner or ASAP here in urgent care or ER if worsening such as shortness of breath not relieved with the inhaler, new fevers or severe wheezing that does not go away with the inhaler.

## 2024-06-27 ENCOUNTER — OFFICE VISIT (OUTPATIENT)
Dept: FAMILY MEDICINE | Facility: CLINIC | Age: 64
End: 2024-06-27
Payer: COMMERCIAL

## 2024-06-27 ENCOUNTER — ANCILLARY PROCEDURE (OUTPATIENT)
Dept: GENERAL RADIOLOGY | Facility: CLINIC | Age: 64
End: 2024-06-27
Attending: PHYSICIAN ASSISTANT
Payer: COMMERCIAL

## 2024-06-27 VITALS
OXYGEN SATURATION: 93 % | RESPIRATION RATE: 16 BRPM | HEART RATE: 67 BPM | DIASTOLIC BLOOD PRESSURE: 74 MMHG | SYSTOLIC BLOOD PRESSURE: 127 MMHG | TEMPERATURE: 97.8 F | WEIGHT: 152.7 LBS | BODY MASS INDEX: 25.81 KG/M2

## 2024-06-27 DIAGNOSIS — R05.2 SUBACUTE COUGH: ICD-10-CM

## 2024-06-27 DIAGNOSIS — J40 BRONCHITIS: Primary | ICD-10-CM

## 2024-06-27 PROCEDURE — 99214 OFFICE O/P EST MOD 30 MIN: CPT | Performed by: PHYSICIAN ASSISTANT

## 2024-06-27 PROCEDURE — 71046 X-RAY EXAM CHEST 2 VIEWS: CPT | Mod: TC | Performed by: RADIOLOGY

## 2024-06-27 RX ORDER — BENZONATATE 100 MG/1
CAPSULE ORAL
Qty: 45 CAPSULE | Refills: 0 | Status: SHIPPED | OUTPATIENT
Start: 2024-06-27 | End: 2024-08-06

## 2024-06-27 RX ORDER — PREDNISONE 20 MG/1
40 TABLET ORAL DAILY
Qty: 10 TABLET | Refills: 0 | Status: SHIPPED | OUTPATIENT
Start: 2024-06-27 | End: 2024-07-02

## 2024-06-27 NOTE — PROGRESS NOTES
Chief Complaint   Patient presents with    Cough     Persistent cough for the last 3 weeks. Was seen on 6-22-24 for same symptoms. Having some wheezing. Denies SOB or fevers.        ASSESSMENT/PLAN:  Yifan was seen today for cough.    Diagnoses and all orders for this visit:    Bronchitis  -     predniSONE (DELTASONE) 20 MG tablet; Take 2 tablets (40 mg) by mouth daily for 5 days  -     benzonatate (TESSALON) 100 MG capsule; Take 1-2 capsules by mouth up to 3 x a day as needed with food    Subacute cough  -     XR Chest 2 Views; Future    Has some persistent mild wheeze in the right lower lobe.  He has no significant systemic symptoms.  Albuterol is helped a little bit so we will do a trial of prednisone burst for suspected bronchitis.  Did considered atypical pneumonia, x-ray obtained and no acute cardiopulmonary abnormality noted.  Since he has minimal systemic symptoms we will just treat with the prednisone burst for now.  Follow-up if not improving the next few days    Modesto Khan PA-C      SUBJECTIVE:  Venancio is a 64 year old male who presents to urgent care with 4 weeks of persistent cough.  Was seen 5 days ago and diagnosed with URI, likely viral and treated with albuterol for wheeze.  He still has some wheezing the albuterol helps just briefly though and then the cough returns.  He feels otherwise well.  Quit smoking about 15 years    ROS: Pertinent ROS neg other than the symptoms noted above in the HPI.     OBJECTIVE:  /74   Pulse 67   Temp 97.8  F (36.6  C) (Oral)   Resp 16   Wt 69.3 kg (152 lb 11.2 oz)   SpO2 93%   BMI 25.81 kg/m     GENERAL: alert and no distress  EYES: Eyes grossly normal to inspection, PERRL and conjunctivae and sclerae normal  HENT: ear canals and TM's normal, nose and mouth without ulcers or lesions  RESP: Intermittent subtle wheeze in the left lower lobe, subtle persistent wheeze of the right lower lobe  CV: regular rate and rhythm, normal S1 S2, no S3 or S4, no  "murmur, click or rub, no peripheral edema     DIAGNOSTICS  Xray - Reviewed and interpreted by me.  No acute cardiopulmonary normality    Results for orders placed or performed in visit on 06/27/24   XR Chest 2 Views     Status: None    Narrative    EXAM: XR CHEST 2 VIEWS  LOCATION: [facility]  DATE: 6/27/2024    INDICATION: 4 wek cough, wheeze RLL, previous smoker  COMPARISON: None.      Impression    IMPRESSION: Negative chest.        Current Outpatient Medications   Medication Sig Dispense Refill    aspirin (ASA) 81 MG chewable tablet Take 2 tablets (162 mg) by mouth daily      coenzyme Q-10 capsule Take 1 capsule by mouth daily      fish oil-omega-3 fatty acids 1000 MG capsule Take 2 g by mouth daily      gemfibrozil (LOPID) 600 MG tablet Take 1 tablet by mouth twice daily 180 tablet 3    JARDIANCE 10 MG TABS tablet Take 1 tablet by mouth once daily 90 tablet 0    lisinopril (ZESTRIL) 20 MG tablet Take 1 tablet (20 mg) by mouth daily 90 tablet 3    metoprolol succinate ER (TOPROL XL) 100 MG 24 hr tablet Take 1 tablet (100 mg) by mouth daily 90 tablet 3    nitroGLYcerin (NITROSTAT) 0.4 MG sublingual tablet One tablet under the tongue every 5 minutes if needed for chest pain. May repeat every 5 minutes for a maximum of 3 doses in 15 minutes\" 25 tablet 3    rosuvastatin (CRESTOR) 20 MG tablet Take 1 tablet (20 mg) by mouth daily 90 tablet 3    spironolactone (ALDACTONE) 25 MG tablet Take 1/2 (one-half) tablet by mouth once daily 45 tablet 0     No current facility-administered medications for this visit.      Patient Active Problem List   Diagnosis    Essential hypertension, benign    Coronary artery disease involving native coronary artery of native heart without angina pectoris    Hyperlipidemia, unspecified hyperlipidemia type    ANISA (obstructive sleep apnea)    NYHA class 3 heart failure with preserved ejection fraction (H)    Acute myocardial infarction (H)    Alcohol dependence in " remission (H)    Other primary cardiomyopathies    Primary hypercholesterolemia      Past Medical History:   Diagnosis Date    Tobacco use disorder 2006     Past Surgical History:   Procedure Laterality Date    CARDIAC CATHETERIZATION      CARDIAC CATHETERIZATION      CARDIAC CATHETERIZATION  2018    CORONARY STENT PLACEMENT      [facility]    CV CORONARY ANGIOGRAM N/A 2018    Procedure: Coronary Angiogram;  Surgeon: Frank Wilburn MD;  Location: [facility] Cath Lab;  Service:     CV CORONARY ANGIOGRAM N/A 2022    Procedure: Coronary Angiogram;  Surgeon: Nona George MD;  Location: [facility] CATH LAB CV    CV LEFT HEART CATH N/A 2022    Procedure: Left Heart Cath;  Surgeon: Nona George MD;  Location: [facility] LAB CV    CV LEFT HEART CATHETERIZATION WITH LEFT VENTRICULOGRAM N/A 2018    Procedure: Left Heart Catheterization with Left Ventriculogram;  Surgeon: Frank Wilburn MD;  Location: [facility] Cath Lab;  Service:     HERNIA REPAIR      SURGICAL HISTORY OF -       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    SURGICAL HISTORY OF -   2000    Right inguinal hernia with high ligation of indirect hernia sac and placement of Prolene mesh     Family History   Problem Relation Age of Onset    Hypertension Mother     Cerebrovascular Disease Mother     Cardiovascular Mother         Marfan's Syndrome    Respiratory Father         emphesema    Snoring Father     Asthma Sister     Cardiovascular Sister         aortic aneurism Marfan's syndrome.     Social History     Tobacco Use    Smoking status: Former     Current packs/day: 0.00     Average packs/day: 1 pack/day for 25.0 years (25.0 ttl pk-yrs)     Types: Cigarettes     Start date:      Quit date: 2012     Years since quittin.4     Passive exposure: Never    Smokeless tobacco: Never   Substance Use Topics    Alcohol use: No              The plan of care was discussed with  the patient. They understand and agree with the course of treatment prescribed. A printed summary was given including instructions and medications.  The use of Dragon/PowerMic dictation services may have been used to construct the content in this note; any grammatical or spelling errors are non-intentional. Please contact the author of this note directly if you are in need of any clarification.

## 2024-07-08 DIAGNOSIS — I50.30 HEART FAILURE WITH PRESERVED EJECTION FRACTION, NYHA CLASS II (H): ICD-10-CM

## 2024-07-09 ENCOUNTER — OFFICE VISIT (OUTPATIENT)
Dept: FAMILY MEDICINE | Facility: CLINIC | Age: 64
End: 2024-07-09
Payer: COMMERCIAL

## 2024-07-09 VITALS
BODY MASS INDEX: 26.03 KG/M2 | SYSTOLIC BLOOD PRESSURE: 110 MMHG | OXYGEN SATURATION: 96 % | WEIGHT: 152.5 LBS | RESPIRATION RATE: 18 BRPM | HEART RATE: 58 BPM | HEIGHT: 64 IN | TEMPERATURE: 97.9 F | DIASTOLIC BLOOD PRESSURE: 72 MMHG

## 2024-07-09 DIAGNOSIS — J40 BRONCHITIS: Primary | ICD-10-CM

## 2024-07-09 PROCEDURE — 99214 OFFICE O/P EST MOD 30 MIN: CPT | Performed by: PHYSICIAN ASSISTANT

## 2024-07-09 RX ORDER — AZITHROMYCIN 250 MG/1
TABLET, FILM COATED ORAL
Qty: 6 TABLET | Refills: 0 | Status: SHIPPED | OUTPATIENT
Start: 2024-07-09 | End: 2024-07-14

## 2024-07-09 RX ORDER — FEXOFENADINE HCL 180 MG/1
180 TABLET ORAL DAILY
Qty: 90 TABLET | Refills: 3 | Status: SHIPPED | OUTPATIENT
Start: 2024-07-09

## 2024-07-09 RX ORDER — FLUTICASONE PROPIONATE 50 MCG
1 SPRAY, SUSPENSION (ML) NASAL DAILY
Qty: 18.2 G | Refills: 1 | Status: SHIPPED | OUTPATIENT
Start: 2024-07-09

## 2024-07-09 ASSESSMENT — ENCOUNTER SYMPTOMS
PALPITATIONS: 0
NAUSEA: 0
LIGHT-HEADEDNESS: 0
VOMITING: 0
SHORTNESS OF BREATH: 0
BACK PAIN: 0
RECTAL PAIN: 0
HEADACHES: 0
WHEEZING: 0
DIARRHEA: 0
ARTHRALGIAS: 0
ABDOMINAL PAIN: 0
COLOR CHANGE: 0
FEVER: 0
DYSURIA: 0
SORE THROAT: 0
COUGH: 1
EYE PAIN: 0
CONSTIPATION: 0
BLOOD IN STOOL: 0
HEMATURIA: 0
SEIZURES: 0
DIZZINESS: 0
CHILLS: 0

## 2024-07-09 ASSESSMENT — PAIN SCALES - GENERAL: PAINLEVEL: NO PAIN (0)

## 2024-07-09 NOTE — PROGRESS NOTES
Assessment & Plan     Bronchitis  Symptoms have improved since being discharged from urgent care.  Was seen in urgent care 6/22 and again 6/27.  He was prescribed Tessalon Perles and prednisone.  Still having a lingering cough and congestion.  He feels that he is about 80% better.  No fevers or chills.  He did have a negative chest x-ray on 6/27.  Vitals are stable.  Lungs are clear to auscultation.  He does not appear to be in any respiratory distress.  I do not think we need to repeat imaging today.  On exam he does have bilateral serous otitis without infection.  He is also continuing to have nasal congestion.  I suspect that he has underlying seasonal allergies.  Recommended Flonase and Allegra for the next 1 to 2 weeks.  Since his symptoms have been ongoing for about 6 weeks we will send a prescription for azithromycin that he can start later in this week or early next week if symptoms or not improving.  Symptoms for prompt reevaluation discussed.  If he does not take the azithromycin and symptoms do not improve he is to follow-up.  Continue with over-the-counter cough and cold medications.  Push fluids and get plenty of rest.  Tylenol for fevers aches and pains.  Patient agreed to this plan.  Questions were answered  - fluticasone (FLONASE) 50 MCG/ACT nasal spray; Spray 1 spray into both nostrils daily  - fexofenadine (ALLEGRA) 180 MG tablet; Take 1 tablet (180 mg) by mouth daily  - azithromycin (ZITHROMAX) 250 MG tablet; Take 2 tablets (500 mg) by mouth daily for 1 day, THEN 1 tablet (250 mg) daily for 4 days.    [facility] notes reviewed x 2, CXR reviewed x 1    Subjective   Yifan is a 64 year old, presenting for the following health issues:  Follow Up (URI- Most sx resolved cough continued)        7/9/2024     3:57 PM   Additional Questions   Roomed by Caridad Araujo CMA     Yifan is a pleasant 64-year-old male who presents to the clinic today for urgent care follow-up.  He was seen in urgent care on 6/22 and again  "on 6/24 for upper respiratory symptoms, cough, and wheezing.  He was prescribed albuterol inhaler along with Tessalon Perles.  On his second urgent care visit 6/24 he was given prednisone.  He did undergo a chest x-ray at that time which did not show any opacifications or signs of infection.  Since he has been taking the Tessalon Perles and has finished the prednisone his cough has improved but he continues to have nasal congestion and some ear pressure and discomfort.  Symptoms have been ongoing for approximately 6 weeks overall.  He is not having any chest pain, shortness of breath, fevers or chills.  He feels that his symptoms are slowly improving but his cough and congestion is still lingering.  He is not having any abdominal pain.  Is eating and drinking well.  He does not appear to be in any respiratory distress.  Vitals are stable.         Review of Systems   Constitutional:  Negative for chills and fever.   HENT:  Positive for congestion. Negative for ear pain and sore throat.    Eyes:  Negative for pain and visual disturbance.   Respiratory:  Positive for cough. Negative for shortness of breath and wheezing.    Cardiovascular:  Negative for chest pain and palpitations.   Gastrointestinal:  Negative for abdominal pain, blood in stool, constipation, diarrhea, nausea, rectal pain and vomiting.   Genitourinary:  Negative for dysuria and hematuria.   Musculoskeletal:  Negative for arthralgias and back pain.   Skin:  Negative for color change and rash.   Neurological:  Negative for dizziness, seizures, syncope, light-headedness and headaches.   All other systems reviewed and are negative.          Objective    /72 (BP Location: Right arm, Patient Position: Sitting, Cuff Size: Adult Regular)   Pulse 58   Temp 97.9  F (36.6  C) (Oral)   Resp 18   Ht 1.626 m (5' 4\")   Wt 69.2 kg (152 lb 8 oz)   SpO2 96%   BMI 26.18 kg/m    Body mass index is 26.18 kg/m .  Physical Exam  Vitals and nursing note reviewed. "   Constitutional:       General: He is not in acute distress.     Appearance: Normal appearance. He is not ill-appearing, toxic-appearing or diaphoretic.   HENT:      Head: Normocephalic and atraumatic.      Right Ear: Ear canal and external ear normal. A middle ear effusion is present.      Left Ear: Ear canal and external ear normal. A middle ear effusion is present.      Nose: Rhinorrhea present.      Right Sinus: No maxillary sinus tenderness or frontal sinus tenderness.      Left Sinus: No maxillary sinus tenderness or frontal sinus tenderness.   Cardiovascular:      Rate and Rhythm: Normal rate and regular rhythm.      Heart sounds: No murmur heard.     No friction rub. No gallop.   Pulmonary:      Effort: Pulmonary effort is normal.      Breath sounds: No wheezing, rhonchi or rales.   Musculoskeletal:      Cervical back: Full passive range of motion without pain and neck supple.      Right lower leg: No edema.      Left lower leg: No edema.   Lymphadenopathy:      Cervical: No cervical adenopathy.   Neurological:      General: No focal deficit present.      Mental Status: He is alert and oriented to person, place, and time.   Psychiatric:         Mood and Affect: Mood normal.         Behavior: Behavior normal.         Thought Content: Thought content normal.         Judgment: Judgment normal.                Signed Electronically by: Arjun Escalante PA-C

## 2024-07-10 RX ORDER — EMPAGLIFLOZIN 10 MG/1
10 TABLET, FILM COATED ORAL DAILY
Qty: 90 TABLET | Refills: 3 | Status: SHIPPED | OUTPATIENT
Start: 2024-07-10

## 2024-07-10 RX ORDER — SPIRONOLACTONE 25 MG/1
12.5 TABLET ORAL DAILY
Qty: 45 TABLET | Refills: 3 | Status: SHIPPED | OUTPATIENT
Start: 2024-07-10

## 2024-07-19 DIAGNOSIS — E78.5 HYPERLIPIDEMIA, UNSPECIFIED HYPERLIPIDEMIA TYPE: ICD-10-CM

## 2024-07-19 DIAGNOSIS — I10 ESSENTIAL HYPERTENSION, BENIGN: ICD-10-CM

## 2024-07-22 RX ORDER — ROSUVASTATIN CALCIUM 20 MG/1
20 TABLET, COATED ORAL DAILY
Qty: 90 TABLET | Refills: 0 | Status: SHIPPED | OUTPATIENT
Start: 2024-07-22

## 2024-07-22 RX ORDER — METOPROLOL SUCCINATE 100 MG/1
100 TABLET, EXTENDED RELEASE ORAL DAILY
Qty: 90 TABLET | Refills: 0 | Status: SHIPPED | OUTPATIENT
Start: 2024-07-22

## 2024-08-06 ENCOUNTER — OFFICE VISIT (OUTPATIENT)
Dept: CARDIOLOGY | Facility: CLINIC | Age: 64
End: 2024-08-06
Payer: COMMERCIAL

## 2024-08-06 VITALS
WEIGHT: 152 LBS | SYSTOLIC BLOOD PRESSURE: 120 MMHG | DIASTOLIC BLOOD PRESSURE: 72 MMHG | HEIGHT: 64 IN | RESPIRATION RATE: 14 BRPM | HEART RATE: 62 BPM | BODY MASS INDEX: 25.95 KG/M2

## 2024-08-06 DIAGNOSIS — E78.5 DYSLIPIDEMIA: ICD-10-CM

## 2024-08-06 DIAGNOSIS — I25.10 CORONARY ARTERY DISEASE INVOLVING NATIVE CORONARY ARTERY OF NATIVE HEART WITHOUT ANGINA PECTORIS: Primary | ICD-10-CM

## 2024-08-06 DIAGNOSIS — I10 HYPERTENSION, UNSPECIFIED TYPE: ICD-10-CM

## 2024-08-06 PROCEDURE — 99214 OFFICE O/P EST MOD 30 MIN: CPT | Performed by: INTERNAL MEDICINE

## 2024-08-06 NOTE — LETTER
8/6/2024    Arjun Escalante PA-C  [address]  [facility] [address]    RE: Venancio YOUNG Rajan       Dear Colleague,     I had the pleasure of seeing Venancio Tolentino in the [facility].         [facility] [address], [place] [address]   [email or web address]   OFFICE: [phone]            Impression and Plan     1.  Coronary artery disease. Yifan he has known coronary artery disease having had prior PCI with stent deployment to the ostial/proximal LAD, distal LAD, as well as proximal-mid right coronary artery in 2015.  Angiogram 25 February 2022 revealed no significant obstructive coronary disease and wide patency of the aforementioned stents.     This is stable.  He denies any chest pain or other concerning symptoms.     2.  Hypertension.  Blood pressure is reasonable in the office today at 120/72 mmHg.     3.  Dyslipidemia.  Lipid profile 16 April 2024 revealed LDL 51 mg/dL and HDL 42 mg/dL.  Plan to continue current management.     Plan on follow-up in 1 year.    35 minutes spent reviewing prior records (including documentation, laboratory studies, cardiac testing/imaging), interview with patient along with physical exam, planning, and subsequent documentation/crafting of note).           History of Present Illness    Once again I would like to thank you again for asking me to participate in the care of your patient, Venancio Tolentino.  As you know, but to reiterate for my own records, Venancio Tolentino is a 64 year old male with known coronary artery disease having had prior PCI with stent deployment to the ostial/proximal LAD, distal LAD, as well as proximal-mid right coronary artery in 2015.  Angiogram 25 February 2022 revealed no significant obstructive coronary disease and wide patency of the aforementioned stents.     On interview today, Yifan states he has been doing very well.  He denies any chest pain symptoms of  "concern.  Breathing is comfortable.  No decline in exercise tolerance.  No palpitations or lightheadedness.    Further review of systems is otherwise negative/noncontributory (medical record and 13 point review of systems reviewed as well and pertinent positives noted).         Cardiac Diagnostics      Echocardiogram 4 January 2021:  Normal left ventricular size and systolic performance with ejection fraction of 55 to 60%.  No significant valvular heart disease.  Normal right ventricular size and systolic performance.    Coronary angiogram 25 February 2022:  Left anterior descending coronary artery: Previously deployed stent and ostial/proximal LAD is widely patent.  Previously deployed stent in distal LAD is widely patent.  Circumflex coronary artery: First obtuse marginal with 25% stenosis.  Right coronary artery: Proximal 20% stenosis.  Previously deployed stent in proximal-mid right coronary artery is widely patent.    Holter monitor 28 July 2022:  Holter monitoring from 7/28/2022 to 7/29/2022 (duration 24hrs).  Predominant underlying rhythm was sinus rhythm, 62 to 100bpm, average 76bpm.  1 episode of nonsustained atrial tachycardia lasting 10 beats, 132bpm.  No sustained tachyarrhythmias.  No atrial fibrillation.  There were no pauses of greater than 3 seconds.  Rare supraventricular ectopic beats (<1%).  Rare premature ventricular contractions (<1%).  No symptoms recorded.            Physical Examination       /72 (BP Location: Right arm, Patient Position: Sitting, Cuff Size: Adult Regular)   Pulse 62   Resp 14   Ht 1.626 m (5' 4\")   Wt 68.9 kg (152 lb)   BMI 26.09 kg/m          Wt Readings from Last 3 Encounters:   08/06/24 68.9 kg (152 lb)   07/09/24 69.2 kg (152 lb 8 oz)   06/27/24 69.3 kg (152 lb 11.2 oz)       The patient is alert and oriented times three. Sclerae are anicteric. Mucosal membranes are moist. Jugular venous pressure is normal. No significant adenopathy/thyromegally appreciated. " Lungs are clear with good expansion. On cardiovascular exam, the patient has a regular S1 and S2. Abdomen is soft and non-tender. Extremities reveal no clubbing, cyanosis, or edema.         Family History/Social History/Risk Factors   Patient does not smoke.  Family history reviewed, and family history includes Asthma in his sister; Cardiovascular in his mother and sister; Cerebrovascular Disease in his mother; Hypertension in his mother; Respiratory in his father; Snoring in his father.          Medical History  Surgical History Family History Social History   Past Medical History:   Diagnosis Date     Tobacco use disorder 9/8/2006     Past Surgical History:   Procedure Laterality Date     CARDIAC CATHETERIZATION  2014     CARDIAC CATHETERIZATION  2015     CARDIAC CATHETERIZATION  05/04/2018     CORONARY STENT PLACEMENT      [facility] lad     CV CORONARY ANGIOGRAM N/A 5/4/2018    Procedure: Coronary Angiogram;  Surgeon: Frank Wilburn MD;  Location: [facility];  Service:      CV CORONARY ANGIOGRAM N/A 2/25/2022    Procedure: Coronary Angiogram;  Surgeon: Nona George MD;  Location: [facility] CATH LAB CV     CV LEFT HEART CATH N/A 2/25/2022    Procedure: Left Heart Cath;  Surgeon: Nona George MD;  Location: [facility] CATH LAB CV     CV LEFT HEART CATHETERIZATION WITH LEFT VENTRICULOGRAM N/A 5/4/2018    Procedure: Left Heart Catheterization with Left Ventriculogram;  Surgeon: Frank Wilburn MD;  Location: [facility] Lab;  Service:      HERNIA REPAIR       SURGICAL HISTORY OF -   1993    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     SURGICAL HISTORY OF -   7/7/2000    Right inguinal hernia with high ligation of indirect hernia sac and placement of Prolene mesh     Family History   Problem Relation Age of Onset     Hypertension Mother      Cerebrovascular Disease Mother      Cardiovascular Mother         Marfan's Syndrome     Respiratory Father         emphesema      Snoring Father      Asthma Sister      Cardiovascular Sister         aortic aneurism Marfan's syndrome.        Social History     Socioeconomic History     Marital status:      Spouse name: Linda     Number of children: 3     Years of education: Not on file     Highest education level: Not on file   Occupational History     Not on file   Tobacco Use     Smoking status: Former     Current packs/day: 0.00     Average packs/day: 1 pack/day for 25.0 years (25.0 ttl pk-yrs)     Types: Cigarettes     Start date:      Quit date:      Years since quittin.6     Passive exposure: Never     Smokeless tobacco: Never   Vaping Use     Vaping status: Never Used   Substance and Sexual Activity     Alcohol use: No     Drug use: Never     Sexual activity: Not Currently     Partners: Female   Other Topics Concern     Not on file   Social History Narrative     Not on file     Social Determinants of Health     Financial Resource Strain: Low Risk  (10/13/2023)    Financial Resource Strain      Within the past 12 months, have you or your family members you live with been unable to get utilities (heat, electricity) when it was really needed?: No   Food Insecurity: Low Risk  (10/13/2023)    Food Insecurity      Within the past 12 months, did you worry that your food would run out before you got money to buy more?: No      Within the past 12 months, did the food you bought just not last and you didn t have money to get more?: No   Transportation Needs: Low Risk  (10/13/2023)    Transportation Needs      Within the past 12 months, has lack of transportation kept you from medical appointments, getting your medicines, non-medical meetings or appointments, work, or from getting things that you need?: No   Physical Activity: Not on file   Stress: Not on file   Social Connections: Not on file   Interpersonal Safety: Low Risk  (2024)    Interpersonal Safety      Do you feel physically and emotionally safe where you  "currently live?: Yes      Within the past 12 months, have you been hit, slapped, kicked or otherwise physically hurt by someone?: No      Within the past 12 months, have you been humiliated or emotionally abused in other ways by your partner or ex-partner?: No   Housing Stability: Low Risk  (10/13/2023)    Housing Stability      Do you have housing? : Yes      Are you worried about losing your housing?: No           Medications  Allergies   Current Outpatient Medications   Medication Sig Dispense Refill     aspirin (ASA) 81 MG chewable tablet Take 2 tablets (162 mg) by mouth daily       coenzyme Q-10 capsule Take 1 capsule by mouth daily       empagliflozin (JARDIANCE) 10 MG TABS tablet Take 1 tablet by mouth once daily 90 tablet 3     fexofenadine (ALLEGRA) 180 MG tablet Take 1 tablet (180 mg) by mouth daily 90 tablet 3     fish oil-omega-3 fatty acids 1000 MG capsule Take 2 g by mouth daily       fluticasone (FLONASE) 50 MCG/ACT nasal spray Spray 1 spray into both nostrils daily 18.2 g 1     gemfibrozil (LOPID) 600 MG tablet Take 1 tablet by mouth twice daily 180 tablet 3     lisinopril (ZESTRIL) 20 MG tablet Take 1 tablet (20 mg) by mouth daily 90 tablet 3     metoprolol succinate ER (TOPROL XL) 100 MG 24 hr tablet Take 1 tablet by mouth once daily 90 tablet 0     nitroGLYcerin (NITROSTAT) 0.4 MG sublingual tablet One tablet under the tongue every 5 minutes if needed for chest pain. May repeat every 5 minutes for a maximum of 3 doses in 15 minutes\" 25 tablet 3     rosuvastatin (CRESTOR) 20 MG tablet Take 1 tablet by mouth once daily 90 tablet 0     spironolactone (ALDACTONE) 25 MG tablet Take 1/2 (one-half) tablet by mouth once daily 45 tablet 3     No Known Allergies       Lab Results    Chemistry/lipid CBC Cardiac Enzymes/BNP/TSH/INR   Recent Labs   Lab Test 04/16/24  1447   CHOL 115   HDL 42   LDL 51   TRIG 111     Recent Labs   Lab Test 04/16/24  1447 10/20/23  1625 05/04/23  1425   LDL 51 48 55     Recent " "Labs   Lab Test 10/20/23  1625      POTASSIUM 4.0   CHLORIDE 98   CO2 26   GLC 83   BUN 15.6   CR 0.92   GFRESTIMATED >90   DEENA 9.6     Recent Labs   Lab Test 10/20/23  1625 04/19/23  1509 08/01/22  1559   CR 0.92 1.03 1.28     Recent Labs   Lab Test 10/20/23  1625 04/19/23  1509 10/05/20  1712   A1C 5.2 5.4 5.4          Recent Labs   Lab Test 10/20/23  1625   WBC 8.0   HGB 14.8   HCT 41.7   MCV 92        Recent Labs   Lab Test 10/20/23  1625 04/19/23  1509 02/25/22  0812   HGB 14.8 15.5 15.3    No results for input(s): \"TROPONINI\" in the last 29803 hours.  Recent Labs   Lab Test 03/16/22  0825   BNP 34     Recent Labs   Lab Test 05/04/18  0919   TSH 1.52     No results for input(s): \"INR\" in the last 38439 hours.       Medications  Allergies   Current Outpatient Medications   Medication Sig Dispense Refill     aspirin (ASA) 81 MG chewable tablet Take 2 tablets (162 mg) by mouth daily       coenzyme Q-10 capsule Take 1 capsule by mouth daily       empagliflozin (JARDIANCE) 10 MG TABS tablet Take 1 tablet by mouth once daily 90 tablet 3     fexofenadine (ALLEGRA) 180 MG tablet Take 1 tablet (180 mg) by mouth daily 90 tablet 3     fish oil-omega-3 fatty acids 1000 MG capsule Take 2 g by mouth daily       fluticasone (FLONASE) 50 MCG/ACT nasal spray Spray 1 spray into both nostrils daily 18.2 g 1     gemfibrozil (LOPID) 600 MG tablet Take 1 tablet by mouth twice daily 180 tablet 3     lisinopril (ZESTRIL) 20 MG tablet Take 1 tablet (20 mg) by mouth daily 90 tablet 3     metoprolol succinate ER (TOPROL XL) 100 MG 24 hr tablet Take 1 tablet by mouth once daily 90 tablet 0     nitroGLYcerin (NITROSTAT) 0.4 MG sublingual tablet One tablet under the tongue every 5 minutes if needed for chest pain. May repeat every 5 minutes for a maximum of 3 doses in 15 minutes\" 25 tablet 3     rosuvastatin (CRESTOR) 20 MG tablet Take 1 tablet by mouth once daily 90 tablet 0     spironolactone (ALDACTONE) 25 MG tablet Take 1/2 " "(one-half) tablet by mouth once daily 45 tablet 3      No Known Allergies       Lab Results   Lab Results   Component Value Date     10/20/2023    CO2 26 10/20/2023    CO2 22 08/01/2022    BUN 15.6 10/20/2023    BUN 28 08/01/2022     Lab Results   Component Value Date    WBC 8.0 10/20/2023    HGB 14.8 10/20/2023    HCT 41.7 10/20/2023    MCV 92 10/20/2023     10/20/2023     Lab Results   Component Value Date    CHOL 115 04/16/2024    TRIG 111 04/16/2024    HDL 42 04/16/2024     No results found for: \"INR\"  Lab Results   Component Value Date    BNP 34 03/16/2022     No results found for: \"CKTOTAL\", \"CKMB\", \"TROPONINI\"  Lab Results   Component Value Date    TSH 1.52 05/04/2018                      Thank you for allowing me to participate in the care of your patient.      Sincerely,     Keyonna Hartley MD     [facility]  cc:   Keyonna Hartley MD  [address]  [place] [address]      "

## 2024-08-06 NOTE — PROGRESS NOTES
[facility] [address], [place] [address]   [email or web address]   OFFICE: [phone]            Impression and Plan     1.  Coronary artery disease. Yifan he has known coronary artery disease having had prior PCI with stent deployment to the ostial/proximal LAD, distal LAD, as well as proximal-mid right coronary artery in 2015.  Angiogram 25 February 2022 revealed no significant obstructive coronary disease and wide patency of the aforementioned stents.     This is stable.  He denies any chest pain or other concerning symptoms.     2.  Hypertension.  Blood pressure is reasonable in the office today at 120/72 mmHg.     3.  Dyslipidemia.  Lipid profile 16 April 2024 revealed LDL 51 mg/dL and HDL 42 mg/dL.  Plan to continue current management.     Plan on follow-up in 1 year.    35 minutes spent reviewing prior records (including documentation, laboratory studies, cardiac testing/imaging), interview with patient along with physical exam, planning, and subsequent documentation/crafting of note).           History of Present Illness    Once again I would like to thank you again for asking me to participate in the care of your patient, Venancio Tolentino.  As you know, but to reiterate for my own records, Venancio Tolentino is a 64 year old male with known coronary artery disease having had prior PCI with stent deployment to the ostial/proximal LAD, distal LAD, as well as proximal-mid right coronary artery in 2015.  Angiogram 25 February 2022 revealed no significant obstructive coronary disease and wide patency of the aforementioned stents.     On interview today, Yifan states he has been doing very well.  He denies any chest pain symptoms of concern.  Breathing is comfortable.  No decline in exercise tolerance.  No palpitations or lightheadedness.    Further review of systems is otherwise negative/noncontributory (medical record and 13 point review of systems  "reviewed as well and pertinent positives noted).         Cardiac Diagnostics      Echocardiogram 4 January 2021:  Normal left ventricular size and systolic performance with ejection fraction of 55 to 60%.  No significant valvular heart disease.  Normal right ventricular size and systolic performance.    Coronary angiogram 25 February 2022:  Left anterior descending coronary artery: Previously deployed stent and ostial/proximal LAD is widely patent.  Previously deployed stent in distal LAD is widely patent.  Circumflex coronary artery: First obtuse marginal with 25% stenosis.  Right coronary artery: Proximal 20% stenosis.  Previously deployed stent in proximal-mid right coronary artery is widely patent.    Holter monitor 28 July 2022:  Holter monitoring from 7/28/2022 to 7/29/2022 (duration 24hrs).  Predominant underlying rhythm was sinus rhythm, 62 to 100bpm, average 76bpm.  1 episode of nonsustained atrial tachycardia lasting 10 beats, 132bpm.  No sustained tachyarrhythmias.  No atrial fibrillation.  There were no pauses of greater than 3 seconds.  Rare supraventricular ectopic beats (<1%).  Rare premature ventricular contractions (<1%).  No symptoms recorded.            Physical Examination       /72 (BP Location: Right arm, Patient Position: Sitting, Cuff Size: Adult Regular)   Pulse 62   Resp 14   Ht 1.626 m (5' 4\")   Wt 68.9 kg (152 lb)   BMI 26.09 kg/m          Wt Readings from Last 3 Encounters:   08/06/24 68.9 kg (152 lb)   07/09/24 69.2 kg (152 lb 8 oz)   06/27/24 69.3 kg (152 lb 11.2 oz)       The patient is alert and oriented times three. Sclerae are anicteric. Mucosal membranes are moist. Jugular venous pressure is normal. No significant adenopathy/thyromegally appreciated. Lungs are clear with good expansion. On cardiovascular exam, the patient has a regular S1 and S2. Abdomen is soft and non-tender. Extremities reveal no clubbing, cyanosis, or edema.         Family History/Social " History/Risk Factors   Patient does not smoke.  Family history reviewed, and family history includes Asthma in his sister; Cardiovascular in his mother and sister; Cerebrovascular Disease in his mother; Hypertension in his mother; Respiratory in his father; Snoring in his father.          Medical History  Surgical History Family History Social History   Past Medical History:   Diagnosis Date    Tobacco use disorder 9/8/2006     Past Surgical History:   Procedure Laterality Date    CARDIAC CATHETERIZATION  2014    CARDIAC CATHETERIZATION  2015    CARDIAC CATHETERIZATION  05/04/2018    CORONARY STENT PLACEMENT      rca and lad    CV CORONARY ANGIOGRAM N/A 5/4/2018    Procedure: Coronary Angiogram;  Surgeon: Frank Wilburn MD;  Location: [facility] Cath Lab;  Service:     CV CORONARY ANGIOGRAM N/A 2/25/2022    Procedure: Coronary Angiogram;  Surgeon: Nona George MD;  Location: [facility] CATH LAB CV    CV LEFT HEART CATH N/A 2/25/2022    Procedure: Left Heart Cath;  Surgeon: Nona George MD;  Location: [facility] CATH LAB CV    CV LEFT HEART CATHETERIZATION WITH LEFT VENTRICULOGRAM N/A 5/4/2018    Procedure: Left Heart Catheterization with Left Ventriculogram;  Surgeon: Frank Wilburn MD;  Location: [facility] Cath Lab;  Service:     HERNIA REPAIR      SURGICAL HISTORY OF -   1993    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    SURGICAL HISTORY OF -   7/7/2000    Right inguinal hernia with high ligation of indirect hernia sac and placement of Prolene mesh     Family History   Problem Relation Age of Onset    Hypertension Mother     Cerebrovascular Disease Mother     Cardiovascular Mother         Marfan's Syndrome    Respiratory Father         emphesema    Snoring Father     Asthma Sister     Cardiovascular Sister         aortic aneurism Marfan's syndrome.        Social History     Socioeconomic History    Marital status:      Spouse name: Linda    Number of children: 3     Years of education: Not on file    Highest education level: Not on file   Occupational History    Not on file   Tobacco Use    Smoking status: Former     Current packs/day: 0.00     Average packs/day: 1 pack/day for 25.0 years (25.0 ttl pk-yrs)     Types: Cigarettes     Start date:      Quit date:      Years since quittin.6     Passive exposure: Never    Smokeless tobacco: Never   Vaping Use    Vaping status: Never Used   Substance and Sexual Activity    Alcohol use: No    Drug use: Never    Sexual activity: Not Currently     Partners: Female   Other Topics Concern    Not on file   Social History Narrative    Not on file     Social Determinants of Health     Financial Resource Strain: Low Risk  (10/13/2023)    Financial Resource Strain     Within the past 12 months, have you or your family members you live with been unable to get utilities (heat, electricity) when it was really needed?: No   Food Insecurity: Low Risk  (10/13/2023)    Food Insecurity     Within the past 12 months, did you worry that your food would run out before you got money to buy more?: No     Within the past 12 months, did the food you bought just not last and you didn t have money to get more?: No   Transportation Needs: Low Risk  (10/13/2023)    Transportation Needs     Within the past 12 months, has lack of transportation kept you from medical appointments, getting your medicines, non-medical meetings or appointments, work, or from getting things that you need?: No   Physical Activity: Not on file   Stress: Not on file   Social Connections: Not on file   Interpersonal Safety: Low Risk  (2024)    Interpersonal Safety     Do you feel physically and emotionally safe where you currently live?: Yes     Within the past 12 months, have you been hit, slapped, kicked or otherwise physically hurt by someone?: No     Within the past 12 months, have you been humiliated or emotionally abused in other ways by your partner or  "ex-partner?: No   Housing Stability: Low Risk  (10/13/2023)    Housing Stability     Do you have housing? : Yes     Are you worried about losing your housing?: No           Medications  Allergies   Current Outpatient Medications   Medication Sig Dispense Refill    aspirin (ASA) 81 MG chewable tablet Take 2 tablets (162 mg) by mouth daily      coenzyme Q-10 capsule Take 1 capsule by mouth daily      empagliflozin (JARDIANCE) 10 MG TABS tablet Take 1 tablet by mouth once daily 90 tablet 3    fexofenadine (ALLEGRA) 180 MG tablet Take 1 tablet (180 mg) by mouth daily 90 tablet 3    fish oil-omega-3 fatty acids 1000 MG capsule Take 2 g by mouth daily      fluticasone (FLONASE) 50 MCG/ACT nasal spray Spray 1 spray into both nostrils daily 18.2 g 1    gemfibrozil (LOPID) 600 MG tablet Take 1 tablet by mouth twice daily 180 tablet 3    lisinopril (ZESTRIL) 20 MG tablet Take 1 tablet (20 mg) by mouth daily 90 tablet 3    metoprolol succinate ER (TOPROL XL) 100 MG 24 hr tablet Take 1 tablet by mouth once daily 90 tablet 0    nitroGLYcerin (NITROSTAT) 0.4 MG sublingual tablet One tablet under the tongue every 5 minutes if needed for chest pain. May repeat every 5 minutes for a maximum of 3 doses in 15 minutes\" 25 tablet 3    rosuvastatin (CRESTOR) 20 MG tablet Take 1 tablet by mouth once daily 90 tablet 0    spironolactone (ALDACTONE) 25 MG tablet Take 1/2 (one-half) tablet by mouth once daily 45 tablet 3     No Known Allergies       Lab Results    Chemistry/lipid CBC Cardiac Enzymes/BNP/TSH/INR   Recent Labs   Lab Test 04/16/24  1447   CHOL 115   HDL 42   LDL 51   TRIG 111     Recent Labs   Lab Test 04/16/24  1447 10/20/23  1625 05/04/23  1425   LDL 51 48 55     Recent Labs   Lab Test 10/20/23  1625      POTASSIUM 4.0   CHLORIDE 98   CO2 26   GLC 83   BUN 15.6   CR 0.92   GFRESTIMATED >90   DEENA 9.6     Recent Labs   Lab Test 10/20/23  1625 04/19/23  1509 08/01/22  1559   CR 0.92 1.03 1.28     Recent Labs   Lab Test " "10/20/23  1625 04/19/23  1509 10/05/20  1712   A1C 5.2 5.4 5.4          Recent Labs   Lab Test 10/20/23  1625   WBC 8.0   HGB 14.8   HCT 41.7   MCV 92        Recent Labs   Lab Test 10/20/23  1625 04/19/23  1509 02/25/22  0812   HGB 14.8 15.5 15.3    No results for input(s): \"TROPONINI\" in the last 63529 hours.  Recent Labs   Lab Test 03/16/22  0825   BNP 34     Recent Labs   Lab Test 05/04/18  0919   TSH 1.52     No results for input(s): \"INR\" in the last 54176 hours.       Medications  Allergies   Current Outpatient Medications   Medication Sig Dispense Refill    aspirin (ASA) 81 MG chewable tablet Take 2 tablets (162 mg) by mouth daily      coenzyme Q-10 capsule Take 1 capsule by mouth daily      empagliflozin (JARDIANCE) 10 MG TABS tablet Take 1 tablet by mouth once daily 90 tablet 3    fexofenadine (ALLEGRA) 180 MG tablet Take 1 tablet (180 mg) by mouth daily 90 tablet 3    fish oil-omega-3 fatty acids 1000 MG capsule Take 2 g by mouth daily      fluticasone (FLONASE) 50 MCG/ACT nasal spray Spray 1 spray into both nostrils daily 18.2 g 1    gemfibrozil (LOPID) 600 MG tablet Take 1 tablet by mouth twice daily 180 tablet 3    lisinopril (ZESTRIL) 20 MG tablet Take 1 tablet (20 mg) by mouth daily 90 tablet 3    metoprolol succinate ER (TOPROL XL) 100 MG 24 hr tablet Take 1 tablet by mouth once daily 90 tablet 0    nitroGLYcerin (NITROSTAT) 0.4 MG sublingual tablet One tablet under the tongue every 5 minutes if needed for chest pain. May repeat every 5 minutes for a maximum of 3 doses in 15 minutes\" 25 tablet 3    rosuvastatin (CRESTOR) 20 MG tablet Take 1 tablet by mouth once daily 90 tablet 0    spironolactone (ALDACTONE) 25 MG tablet Take 1/2 (one-half) tablet by mouth once daily 45 tablet 3      No Known Allergies       Lab Results   Lab Results   Component Value Date     10/20/2023    CO2 26 10/20/2023    CO2 22 08/01/2022    BUN 15.6 10/20/2023    BUN 28 08/01/2022     Lab Results   Component " "Value Date    WBC 8.0 10/20/2023    HGB 14.8 10/20/2023    HCT 41.7 10/20/2023    MCV 92 10/20/2023     10/20/2023     Lab Results   Component Value Date    CHOL 115 04/16/2024    TRIG 111 04/16/2024    HDL 42 04/16/2024     No results found for: \"INR\"  Lab Results   Component Value Date    BNP 34 03/16/2022     No results found for: \"CKTOTAL\", \"CKMB\", \"TROPONINI\"  Lab Results   Component Value Date    TSH 1.52 05/04/2018                  "

## 2024-10-08 SDOH — HEALTH STABILITY: PHYSICAL HEALTH: ON AVERAGE, HOW MANY DAYS PER WEEK DO YOU ENGAGE IN MODERATE TO STRENUOUS EXERCISE (LIKE A BRISK WALK)?: 0 DAYS

## 2024-10-08 SDOH — HEALTH STABILITY: PHYSICAL HEALTH: ON AVERAGE, HOW MANY MINUTES DO YOU ENGAGE IN EXERCISE AT THIS LEVEL?: 0 MIN

## 2024-10-08 ASSESSMENT — SOCIAL DETERMINANTS OF HEALTH (SDOH): HOW OFTEN DO YOU GET TOGETHER WITH FRIENDS OR RELATIVES?: ONCE A WEEK

## 2024-10-09 ENCOUNTER — OFFICE VISIT (OUTPATIENT)
Dept: FAMILY MEDICINE | Facility: CLINIC | Age: 64
End: 2024-10-09
Payer: COMMERCIAL

## 2024-10-09 VITALS
DIASTOLIC BLOOD PRESSURE: 72 MMHG | HEIGHT: 64 IN | HEART RATE: 57 BPM | RESPIRATION RATE: 16 BRPM | OXYGEN SATURATION: 100 % | SYSTOLIC BLOOD PRESSURE: 120 MMHG | BODY MASS INDEX: 25.86 KG/M2 | TEMPERATURE: 97.7 F | WEIGHT: 151.5 LBS

## 2024-10-09 DIAGNOSIS — Z12.5 SCREENING FOR PROSTATE CANCER: ICD-10-CM

## 2024-10-09 DIAGNOSIS — F10.21 ALCOHOL DEPENDENCE IN REMISSION (H): ICD-10-CM

## 2024-10-09 DIAGNOSIS — E78.00 PRIMARY HYPERCHOLESTEROLEMIA: ICD-10-CM

## 2024-10-09 DIAGNOSIS — N18.31 STAGE 3A CHRONIC KIDNEY DISEASE (H): ICD-10-CM

## 2024-10-09 DIAGNOSIS — I10 ESSENTIAL HYPERTENSION, BENIGN: ICD-10-CM

## 2024-10-09 DIAGNOSIS — I25.10 CORONARY ARTERY DISEASE INVOLVING NATIVE CORONARY ARTERY OF NATIVE HEART WITHOUT ANGINA PECTORIS: ICD-10-CM

## 2024-10-09 DIAGNOSIS — Z00.00 ANNUAL PHYSICAL EXAM: Primary | ICD-10-CM

## 2024-10-09 DIAGNOSIS — I50.30 NYHA CLASS 3 HEART FAILURE WITH PRESERVED EJECTION FRACTION (H): ICD-10-CM

## 2024-10-09 DIAGNOSIS — E78.5 HYPERLIPIDEMIA, UNSPECIFIED HYPERLIPIDEMIA TYPE: ICD-10-CM

## 2024-10-09 DIAGNOSIS — G47.33 OSA (OBSTRUCTIVE SLEEP APNEA): ICD-10-CM

## 2024-10-09 DIAGNOSIS — I25.5 ISCHEMIC CARDIOMYOPATHY: ICD-10-CM

## 2024-10-09 LAB
ERYTHROCYTE [DISTWIDTH] IN BLOOD BY AUTOMATED COUNT: 12.2 % (ref 10–15)
HCT VFR BLD AUTO: 43.6 % (ref 40–53)
HGB BLD-MCNC: 15.5 G/DL (ref 13.3–17.7)
MCH RBC QN AUTO: 33 PG (ref 26.5–33)
MCHC RBC AUTO-ENTMCNC: 35.6 G/DL (ref 31.5–36.5)
MCV RBC AUTO: 93 FL (ref 78–100)
PLATELET # BLD AUTO: 312 10E3/UL (ref 150–450)
RBC # BLD AUTO: 4.69 10E6/UL (ref 4.4–5.9)
WBC # BLD AUTO: 7.9 10E3/UL (ref 4–11)

## 2024-10-09 PROCEDURE — 80053 COMPREHEN METABOLIC PANEL: CPT | Performed by: PHYSICIAN ASSISTANT

## 2024-10-09 PROCEDURE — 85027 COMPLETE CBC AUTOMATED: CPT | Performed by: PHYSICIAN ASSISTANT

## 2024-10-09 PROCEDURE — 99396 PREV VISIT EST AGE 40-64: CPT | Mod: 25 | Performed by: PHYSICIAN ASSISTANT

## 2024-10-09 PROCEDURE — 90480 ADMN SARSCOV2 VAC 1/ONLY CMP: CPT | Performed by: PHYSICIAN ASSISTANT

## 2024-10-09 PROCEDURE — 91320 SARSCV2 VAC 30MCG TRS-SUC IM: CPT | Performed by: PHYSICIAN ASSISTANT

## 2024-10-09 PROCEDURE — G0103 PSA SCREENING: HCPCS | Performed by: PHYSICIAN ASSISTANT

## 2024-10-09 PROCEDURE — 80061 LIPID PANEL: CPT | Performed by: PHYSICIAN ASSISTANT

## 2024-10-09 PROCEDURE — 90471 IMMUNIZATION ADMIN: CPT | Performed by: PHYSICIAN ASSISTANT

## 2024-10-09 PROCEDURE — 36415 COLL VENOUS BLD VENIPUNCTURE: CPT | Performed by: PHYSICIAN ASSISTANT

## 2024-10-09 PROCEDURE — 90673 RIV3 VACCINE NO PRESERV IM: CPT | Performed by: PHYSICIAN ASSISTANT

## 2024-10-09 RX ORDER — METOPROLOL SUCCINATE 100 MG/1
100 TABLET, EXTENDED RELEASE ORAL DAILY
Qty: 90 TABLET | Refills: 3 | Status: SHIPPED | OUTPATIENT
Start: 2024-10-09

## 2024-10-09 RX ORDER — ROSUVASTATIN CALCIUM 20 MG/1
20 TABLET, COATED ORAL DAILY
Qty: 90 TABLET | Refills: 3 | Status: SHIPPED | OUTPATIENT
Start: 2024-10-09

## 2024-10-09 ASSESSMENT — PAIN SCALES - GENERAL: PAINLEVEL: NO PAIN (0)

## 2024-10-09 NOTE — PROGRESS NOTES
Preventive Care Visit  [facility]  Arjun Escalante PA-C, Family Medicine  Oct 9, 2024      Assessment & Plan     Annual physical exam  Overall doing well.  Will update COVID and flu vaccine.    Essential hypertension, benign  Blood pressure well-controlled at 120/72.  Continue with current antihypertension medications.  - Comprehensive metabolic panel (BMP + Alb, Alk Phos, ALT, AST, Total. Bili, TP); Future  - metoprolol succinate ER (TOPROL XL) 100 MG 24 hr tablet; Take 1 tablet (100 mg) by mouth daily.  - Comprehensive metabolic panel (BMP + Alb, Alk Phos, ALT, AST, Total. Bili, TP)    Coronary artery disease involving native coronary artery of native heart without angina pectoris  Asymptomatic at this time.  Has a prescription for nitroglycerin but has not needed this.  He is s/p stent to the LAD in 2015.  Angiogram from 2022 did not show any significant obstruction.  Last cardiology appointment was 8/24.  He continues on both Crestor and gemfibrozil.  Will check a fasting lipid panel today.  - Lipid panel reflex to direct LDL Fasting; Future  - Lipid panel reflex to direct LDL Fasting  - rosuvastatin (CRESTOR) 20 MG tablet; Take 1 tablet (20 mg) by mouth daily.    Ischemic cardiomyopathy  Echocardiogram 2/22 showed a improved EF at 55 to 60%.  Denies any shortness of breath or edema.  Weights have been stable.    Stage 3a chronic kidney disease (H)  Will check a complete metabolic panel along with a CBC.  Last kidney function remained stable.  - CBC with Platelets; Future  - CBC with Platelets    NYHA class 3 heart failure with preserved ejection fraction (H)  Nuys any shortness of breath, lower extremity edema or orthopnea.  EF stable at 55 to 60%.  He continues on spironolactone, metoprolol, and Jardiance.    ANISA (obstructive sleep apnea)  Good could not tolerate the CPAP.  Recommended scheduling with head and neck pain clinic to be fitted for oral application  "therapy.      Screening for prostate cancer  Will check a screening PSA level.  - PSA, screen; Future  - PSA, screen    Alcohol dependence in remission (H)  In remission    Colon cancer screening  Up-to-date on colonoscopy.  Next colonoscopy due 11/25    Patient has been advised of split billing requirements and indicates understanding: Yes        BMI  Estimated body mass index is 26 kg/m  as calculated from the following:    Height as of this encounter: 1.626 m (5' 4\").    Weight as of this encounter: 68.7 kg (151 lb 8 oz).   Weight management plan: Discussed healthy diet and exercise guidelines    Counseling  Appropriate preventive services were addressed with this patient via screening, questionnaire, or discussion as appropriate for fall prevention, nutrition, physical activity, Tobacco-use cessation, social engagement, weight loss and cognition.  Checklist reviewing preventive services available has been given to the patient.  Reviewed patient's diet, addressing concerns and/or questions.   The patient was instructed to see the dentist every 6 months.   He is at risk for psychosocial distress and has been provided with information to reduce risk.         Subjective   Yifan is a 64 year old, presenting for the following:  Physical (Fasting)        10/9/2024     3:35 PM   Additional Questions   Roomed by [facility]        Health Care Directive  Patient does not have a Health Care Directive or Living Will:     Yifan is a pleasant 64-year-old male that presents to the clinic today for annual physical.  Past medical history significant for hypertension, coronary artery disease, congestive heart failure, ischemic cardiomyopathy, and obstructive sleep apnea.  Overall doing well.  No questions or concerns regarding his chronic health.    Continues on medications including spironolactone, metoprolol, lisinopril, Crestor, gemfibrozil, Jardiance and aspirin daily.            10/8/2024   General Health   How would you " rate your overall physical health? Good   Feel stress (tense, anxious, or unable to sleep) Only a little      (!) STRESS CONCERN      10/8/2024   Nutrition   Three or more servings of calcium each day? (!) NO   Diet: Regular (no restrictions)   How many servings of fruit and vegetables per day? (!) 0-1   How many sweetened beverages each day? 0-1            10/8/2024   Exercise   Days per week of moderate/strenous exercise 0 days   Average minutes spent exercising at this level 0 min      (!) EXERCISE CONCERN      10/8/2024   Social Factors   Frequency of gathering with friends or relatives Once a week   Worry food won't last until get money to buy more No   Food not last or not have enough money for food? No   Do you have housing? (Housing is defined as stable permanent housing and does not include staying ouside in a car, in a tent, in an abandoned building, in an overnight shelter, or couch-surfing.) Yes   Are you worried about losing your housing? No   Lack of transportation? No   Unable to get utilities (heat,electricity)? No            10/8/2024   Fall Risk   Fallen 2 or more times in the past year? No   Trouble with walking or balance? No             10/8/2024   Dental   Dentist two times every year? (!) NO            10/8/2024   TB Screening   Were you born outside of the [place]? No            Today's PHQ-2 Score:       10/8/2024     3:26 PM   PHQ-2 ( 1999 [employer])   Q1: Little interest or pleasure in doing things 0   Q2: Feeling down, depressed or hopeless 0   PHQ-2 Score 0   Q1: Little interest or pleasure in doing things Not at all   Q2: Feeling down, depressed or hopeless Not at all   PHQ-2 Score 0           10/8/2024   Substance Use   Alcohol more than 3/day or more than 7/wk Not Applicable   Do you use any other substances recreationally? No        Social History     Tobacco Use     Smoking status: Former     Current packs/day: 0.00     Average packs/day: 1 pack/day for 25.0 years (25.0 ttl pk-yrs)      Types: Cigarettes     Start date:      Quit date:      Years since quittin.7     Passive exposure: Never     Smokeless tobacco: Never   Vaping Use     Vaping status: Never Used   Substance Use Topics     Alcohol use: No     Drug use: Never           10/8/2024   STI Screening   New sexual partner(s) since last STI/HIV test? No      Last PSA:   Prostate Specific Antigen Screen   Date Value Ref Range Status   10/20/2023 0.55 0.00 - 4.50 ng/mL Final   10/05/2020 0.5 0.0 - 4.5 ng/mL Final     ASCVD Risk   The ASCVD Risk score (Sydnee QUINONEZ, et al., 2019) failed to calculate for the following reasons:    The patient has a prior MI or stroke diagnosis           Reviewed and updated as needed this visit by Provider                    Past Medical History:   Diagnosis Date     Tobacco use disorder 2006     Past Surgical History:   Procedure Laterality Date     CARDIAC CATHETERIZATION       CARDIAC CATHETERIZATION       CARDIAC CATHETERIZATION  2018     CORONARY STENT PLACEMENT      rca and lad     CV CORONARY ANGIOGRAM N/A 2018    Procedure: Coronary Angiogram;  Surgeon: Frank Wilburn MD;  Location: [facility] Cath Lab;  Service:      CV CORONARY ANGIOGRAM N/A 2022    Procedure: Coronary Angiogram;  Surgeon: Nona George MD;  Location: [facility] CATH LAB CV     CV LEFT HEART CATH N/A 2022    Procedure: Left Heart Cath;  Surgeon: Nona George MD;  Location: [facility] CATH LAB CV     CV LEFT HEART CATHETERIZATION WITH LEFT VENTRICULOGRAM N/A 2018    Procedure: Left Heart Catheterization with Left Ventriculogram;  Surgeon: Frank Wilburn MD;  Location: [facility] Cath Lab;  Service:      HERNIA REPAIR       SURGICAL HISTORY OF -       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     SURGICAL HISTORY OF -   2000    Right inguinal hernia with high ligation of indirect hernia sac and placement of Prolene mesh         Review of  "Systems  Constitutional, HEENT, cardiovascular, pulmonary, GI, , musculoskeletal, neuro, skin, endocrine and psych systems are negative, except as otherwise noted.     Objective    Exam  /72 (BP Location: Left arm, Patient Position: Sitting, Cuff Size: Adult Regular)   Pulse 57   Temp 97.7  F (36.5  C) (Oral)   Resp 16   Ht 1.626 m (5' 4\")   Wt 68.7 kg (151 lb 8 oz)   SpO2 100%   BMI 26.00 kg/m     Estimated body mass index is 26 kg/m  as calculated from the following:    Height as of this encounter: 1.626 m (5' 4\").    Weight as of this encounter: 68.7 kg (151 lb 8 oz).    Physical Exam  Vitals and nursing note reviewed.   Constitutional:       General: He is not in acute distress.     Appearance: Normal appearance. He is well-developed and well-groomed. He is not ill-appearing or toxic-appearing.   HENT:      Head: Normocephalic and atraumatic.      Right Ear: Tympanic membrane, ear canal and external ear normal.      Left Ear: Tympanic membrane, ear canal and external ear normal.      Mouth/Throat:      Lips: Pink.      Mouth: Mucous membranes are moist.      Palate: No mass.      Pharynx: Oropharynx is clear. Uvula midline.      Tonsils: No tonsillar exudate or tonsillar abscesses.   Eyes:      General: Lids are normal.         Right eye: No discharge.         Left eye: No discharge.   Neck:      Trachea: Trachea normal.   Cardiovascular:      Rate and Rhythm: Normal rate and regular rhythm.      Heart sounds: S1 normal and S2 normal. No murmur heard.  Pulmonary:      Effort: Pulmonary effort is normal.      Breath sounds: Normal breath sounds and air entry.   Abdominal:      General: Bowel sounds are normal. There is no distension.      Palpations: Abdomen is soft.      Tenderness: There is no abdominal tenderness. There is no guarding or rebound.   Musculoskeletal:      Cervical back: Full passive range of motion without pain and neck supple.      Right lower leg: No edema.      Left lower leg: " No edema.   Lymphadenopathy:      Cervical: No cervical adenopathy.   Skin:     General: Skin is warm and dry.      Findings: No lesion or rash.   Neurological:      General: No focal deficit present.      Mental Status: He is alert.      Cranial Nerves: No cranial nerve deficit.      Gait: Gait is intact.      Deep Tendon Reflexes:      Reflex Scores:       Patellar reflexes are 2+ on the right side and 2+ on the left side.  Psychiatric:         Attention and Perception: Attention normal.         Mood and Affect: Mood normal.         Speech: Speech normal.         Signed Electronically by: Arjun Escalante PA-C

## 2024-10-10 LAB
ALBUMIN SERPL BCG-MCNC: 4.9 G/DL (ref 3.5–5.2)
ALP SERPL-CCNC: 81 U/L (ref 40–150)
ALT SERPL W P-5'-P-CCNC: 17 U/L (ref 0–70)
ANION GAP SERPL CALCULATED.3IONS-SCNC: 11 MMOL/L (ref 7–15)
AST SERPL W P-5'-P-CCNC: 27 U/L (ref 0–45)
BILIRUB SERPL-MCNC: 1 MG/DL
BUN SERPL-MCNC: 19.3 MG/DL (ref 8–23)
CALCIUM SERPL-MCNC: 9.7 MG/DL (ref 8.8–10.4)
CHLORIDE SERPL-SCNC: 98 MMOL/L (ref 98–107)
CHOLEST SERPL-MCNC: 117 MG/DL
CREAT SERPL-MCNC: 0.98 MG/DL (ref 0.67–1.17)
EGFRCR SERPLBLD CKD-EPI 2021: 86 ML/MIN/1.73M2
FASTING STATUS PATIENT QL REPORTED: YES
FASTING STATUS PATIENT QL REPORTED: YES
GLUCOSE SERPL-MCNC: 77 MG/DL (ref 70–99)
HCO3 SERPL-SCNC: 26 MMOL/L (ref 22–29)
HDLC SERPL-MCNC: 44 MG/DL
LDLC SERPL CALC-MCNC: 50 MG/DL
NONHDLC SERPL-MCNC: 73 MG/DL
POTASSIUM SERPL-SCNC: 4.2 MMOL/L (ref 3.4–5.3)
PROT SERPL-MCNC: 7.8 G/DL (ref 6.4–8.3)
PSA SERPL DL<=0.01 NG/ML-MCNC: 0.42 NG/ML (ref 0–4.5)
SODIUM SERPL-SCNC: 135 MMOL/L (ref 135–145)
TRIGL SERPL-MCNC: 114 MG/DL

## 2025-01-24 ENCOUNTER — ANCILLARY PROCEDURE (OUTPATIENT)
Dept: GENERAL RADIOLOGY | Facility: CLINIC | Age: 65
End: 2025-01-24
Attending: PHYSICIAN ASSISTANT
Payer: COMMERCIAL

## 2025-01-24 PROCEDURE — 71046 X-RAY EXAM CHEST 2 VIEWS: CPT | Mod: TC | Performed by: RADIOLOGY

## 2025-02-01 ENCOUNTER — OFFICE VISIT (OUTPATIENT)
Dept: URGENT CARE | Facility: URGENT CARE | Age: 65
End: 2025-02-01
Payer: COMMERCIAL

## 2025-02-01 VITALS
BODY MASS INDEX: 26.95 KG/M2 | WEIGHT: 157 LBS | RESPIRATION RATE: 18 BRPM | DIASTOLIC BLOOD PRESSURE: 80 MMHG | HEART RATE: 81 BPM | TEMPERATURE: 97.7 F | OXYGEN SATURATION: 96 % | SYSTOLIC BLOOD PRESSURE: 163 MMHG

## 2025-02-01 DIAGNOSIS — J01.10 ACUTE NON-RECURRENT FRONTAL SINUSITIS: Primary | ICD-10-CM

## 2025-02-01 PROCEDURE — 99214 OFFICE O/P EST MOD 30 MIN: CPT | Performed by: PHYSICIAN ASSISTANT

## 2025-02-01 RX ORDER — PREDNISONE 20 MG/1
40 TABLET ORAL DAILY
Qty: 10 TABLET | Refills: 0 | Status: SHIPPED | OUTPATIENT
Start: 2025-02-01 | End: 2025-02-06

## 2025-02-01 NOTE — PROGRESS NOTES
Assessment & Plan:      Problem List Items Addressed This Visit    None  Visit Diagnoses       Acute non-recurrent frontal sinusitis    -  Primary    Relevant Medications    predniSONE (DELTASONE) 20 MG tablet    amoxicillin-clavulanate (AUGMENTIN) 875-125 MG tablet          Medical Decision Making  Patient presents with ongoing cough and nasal congestion for over 1 month.  Symptoms today concerning for bacterial sinusitis.  Recommend short course oral steroids and oral Augmentin.  No signs of respiratory distress and no signs of congestive heart failure exacerbation.  Discussed treatment and symptomatic care.  Allergies and medication interactions reviewed.  Discussed signs of worsening symptoms and when to follow-up with PCP if no symptom improvement.     Subjective:      Venancio Tolentino is a 64 year old male here for evaluation of ongoing cough and nasal congestion.  Patient has had symptoms for over 1 month.  Patient was seen 1 week ago.  Chest x-ray at that time was negative for pneumonia.  Patient was given azithromycin, albuterol inhaler, and Tessalon Perles.  Patient felt slightly improved on the azithromycin, but symptoms seem to be returning again over the last couple days.  Patient does not seem to notice any improvement from the albuterol inhaler or the Tessalon Perles.  No fevers.     The following portions of the patient's history were reviewed and updated as appropriate: allergies, current medications, and problem list.     Review of Systems  Pertinent items are noted in HPI.    Allergies  No Known Allergies    Family History   Problem Relation Age of Onset    Hypertension Mother     Cerebrovascular Disease Mother     Cardiovascular Mother         Marfan's Syndrome    Respiratory Father         emphesema    Snoring Father     Asthma Sister     Cardiovascular Sister         aortic aneurism Marfan's syndrome.       Social History     Tobacco Use    Smoking status: Former     Current packs/day: 0.00      Average packs/day: 1 pack/day for 25.0 years (25.0 ttl pk-yrs)     Types: Cigarettes     Start date:      Quit date:      Years since quittin.0     Passive exposure: Never    Smokeless tobacco: Never   Substance Use Topics    Alcohol use: No        Objective:      BP (!) 163/80 (BP Location: Right arm, Patient Position: Sitting, Cuff Size: Adult Regular)   Pulse 81   Temp 97.7  F (36.5  C) (Oral)   Resp 18   Wt 71.2 kg (157 lb)   SpO2 96%   BMI 26.95 kg/m    General appearance - alert, well appearing, and in no distress and non-toxic  Ears - TMs intact with significant mucoid fluid and bulging bilaterally, no erythema  Nose - normal and patent, no erythema, discharge or polyps  Mouth - mucous membranes moist, pharynx normal without lesions  Neck - supple, no significant adenopathy  Chest - clear to auscultation, no wheezes, rales or rhonchi, symmetric air entry  Heart - normal rate, regular rhythm, normal S1, S2, no murmurs, rubs, clicks or gallops     Lab & Imaging Results    No results found for any visits on 25.    I personally reviewed these results and discussed findings with the patient.    The use of [email or web address] dictation services was used to construct the content of this note; any grammatical errors are non-intentional. Please contact the author directly if you are in need of any clarification.

## 2025-02-04 ENCOUNTER — MYC MEDICAL ADVICE (OUTPATIENT)
Dept: CARDIOLOGY | Facility: CLINIC | Age: 65
End: 2025-02-04
Payer: COMMERCIAL

## 2025-02-05 NOTE — TELEPHONE ENCOUNTER
Per [place] Community Measures guidelines, patients blood pressure is out of parameters and recheck blood pressure is recommended.    Last Blood Pressure: 154/85  Last Heart Rate: 71  Date: 1/24/25  Location: Other Specialty    2/4/25 5:30 pm Blood Pressure: 128/73  2/4/25 Heart Rate: 64  Location: Home BP    Patient reported blood pressure updated in Epic. Blood pressure falls within [place] Community Measures guidelines.  Patient will follow up as previously advised.

## 2025-02-10 ENCOUNTER — OFFICE VISIT (OUTPATIENT)
Dept: FAMILY MEDICINE | Facility: CLINIC | Age: 65
End: 2025-02-10
Payer: COMMERCIAL

## 2025-02-10 VITALS
WEIGHT: 151.9 LBS | BODY MASS INDEX: 25.93 KG/M2 | DIASTOLIC BLOOD PRESSURE: 60 MMHG | RESPIRATION RATE: 10 BRPM | OXYGEN SATURATION: 96 % | HEART RATE: 68 BPM | HEIGHT: 64 IN | SYSTOLIC BLOOD PRESSURE: 114 MMHG

## 2025-02-10 DIAGNOSIS — J06.9 UPPER RESPIRATORY TRACT INFECTION, UNSPECIFIED TYPE: ICD-10-CM

## 2025-02-10 DIAGNOSIS — J01.90 ACUTE SINUSITIS WITH SYMPTOMS > 10 DAYS: Primary | ICD-10-CM

## 2025-02-10 PROCEDURE — 99213 OFFICE O/P EST LOW 20 MIN: CPT | Performed by: PHYSICIAN ASSISTANT

## 2025-02-10 ASSESSMENT — ENCOUNTER SYMPTOMS
HEMATURIA: 0
DYSURIA: 0
EYE PAIN: 0
COUGH: 1
SORE THROAT: 0
COLOR CHANGE: 0
BACK PAIN: 0
FEVER: 0
SEIZURES: 0
SHORTNESS OF BREATH: 0
ABDOMINAL PAIN: 0
CHILLS: 0
ARTHRALGIAS: 0
VOMITING: 0
PALPITATIONS: 0

## 2025-02-10 ASSESSMENT — PAIN SCALES - GENERAL: PAINLEVEL_OUTOF10: NO PAIN (0)

## 2025-02-10 NOTE — PROGRESS NOTES
Assessment & Plan     Acute sinusitis with symptoms > 10 days  Started on amoxicillin and prednisone in urgent care 2/4.  Symptoms are improving.  Last dose of prednisone was 2 or 3 days ago.  Previous cough has improved.  No more sinus pressure and congestion.  He has 1 to 2 days left of the amoxicillin and.  Continue to finish the antibiotic.  Clinically improving.    Upper respiratory tract infection, unspecified type  Initially seen in urgent care on 1/24 was prescribed a Z-Patric and then reevaluated 2/4.  Symptoms continued on Z-Patric so was given amoxicillin, prednisone and albuterol.  Off has since subsided.  No new or worsening symptoms.  No chest pain, shortness of breath, lightheaded or dizziness.  Since symptoms are improving I would like him to continue out the amoxicillin.  Lab work including CBC and BMP on 1/24 were stable.  Negative COVID test.  Negative chest x-ray.  Continue with over-the-counter cough and cold medications.  Tylenol and ibuprofen for fevers aches and pains.  If symptoms continue or do not continue to improve going into the next week he is to let me know may need to do further evaluation at that time including PFT and further advanced imaging of his chest.      Urgent care note reviewed x 2, labs reviewed x 2, COVID test reviewed x 1, chest x-ray reviewed x 1    MED REC REQUIRED  Post Medication Reconciliation Status:       Andrews Saha is a 64 year old, presenting for the following health issues:  Cough (Pt had a cough with some antibiotics. Pt feels it is mostly gone but would like it checked. )        2/10/2025     9:46 AM   Additional Questions   Roomed by Teresa ESCOBAR CMA     Yifan is a pleasant 64-year-old male presents to the clinic today for urgent care follow-up.  Seen in urgent care on 1/24 and 2/4.  He had been having upper respiratory symptoms including cough and nasal congestion.  Initial urgent care on 1/24 was prescribed a Z-Patric symptoms continued so went back and on  "2/4 and was prescribed amoxicillin, prednisone and albuterol inhaler.  Antibiotics were extended and prednisone was given to 2 possible sinus infection.  He has been using the albuterol infrequently as he felt that it had has not helped with his cough.  Cough has improved and has not had cough over the last 2 or 3 days still having some lingering nasal congestion.  Finished prednisone 2 days ago has 1 to 2 days left of the amoxicillin.  Labs in urgent care on 1/24 including a CBC and a BMP were stable.  Negative COVID test at that time.  Chest x-ray 1/24 was negative.  Previous smoker quit 15 years ago.  He is not having any chest pain, shortness of breath, lightheaded or dizziness.  No lower extremity edema.    Cough  Pertinent negatives include no chest pain, no chills, no ear pain, no sore throat and no shortness of breath.     Review of Systems   Constitutional:  Negative for chills and fever.   HENT:  Negative for ear pain and sore throat.    Eyes:  Negative for pain and visual disturbance.   Respiratory:  Positive for cough. Negative for shortness of breath.    Cardiovascular:  Negative for chest pain and palpitations.   Gastrointestinal:  Negative for abdominal pain and vomiting.   Genitourinary:  Negative for dysuria and hematuria.   Musculoskeletal:  Negative for arthralgias and back pain.   Skin:  Negative for color change and rash.   Neurological:  Negative for seizures and syncope.   All other systems reviewed and are negative.          Objective    /60 (BP Location: Right arm, Patient Position: Sitting, Cuff Size: Adult Regular)   Pulse 68   Resp 10   Ht 1.626 m (5' 4\")   Wt 68.9 kg (151 lb 14.4 oz)   SpO2 96%   BMI 26.07 kg/m    Body mass index is 26.07 kg/m .  Physical Exam  Vitals and nursing note reviewed.   Constitutional:       General: He is not in acute distress.     Appearance: Normal appearance. He is not ill-appearing, toxic-appearing or diaphoretic.   HENT:      Head: " Normocephalic and atraumatic.      Right Ear: Ear canal and external ear normal. A middle ear effusion is present. No mastoid tenderness. Tympanic membrane is not retracted or bulging.      Left Ear: Ear canal and external ear normal. A middle ear effusion is present. No mastoid tenderness. Tympanic membrane is not erythematous, retracted or bulging.      Nose: Rhinorrhea present.      Mouth/Throat:      Lips: Pink.      Mouth: Mucous membranes are moist.      Tongue: No lesions.      Pharynx: Oropharynx is clear. No pharyngeal swelling or posterior oropharyngeal erythema.      Tonsils: No tonsillar exudate or tonsillar abscesses. 1+ on the right. 1+ on the left.   Cardiovascular:      Rate and Rhythm: Normal rate and regular rhythm.      Heart sounds: No murmur heard.     No friction rub. No gallop.   Pulmonary:      Effort: Pulmonary effort is normal.      Breath sounds: No wheezing, rhonchi or rales.   Musculoskeletal:      Right lower leg: No edema.      Left lower leg: No edema.   Neurological:      General: No focal deficit present.      Mental Status: He is alert and oriented to person, place, and time.   Psychiatric:         Mood and Affect: Mood normal.         Behavior: Behavior normal.         Thought Content: Thought content normal.         Judgment: Judgment normal.                  Signed Electronically by: Arjun Escalante PA-C

## 2025-05-18 DIAGNOSIS — E78.1 HYPERTRIGLYCERIDEMIA: ICD-10-CM

## 2025-05-18 DIAGNOSIS — I10 ESSENTIAL HYPERTENSION, BENIGN: ICD-10-CM

## 2025-05-19 RX ORDER — GEMFIBROZIL 600 MG/1
600 TABLET, FILM COATED ORAL 2 TIMES DAILY
Qty: 180 TABLET | Refills: 0 | Status: SHIPPED | OUTPATIENT
Start: 2025-05-19

## 2025-05-19 RX ORDER — LISINOPRIL 20 MG/1
20 TABLET ORAL DAILY
Qty: 90 TABLET | Refills: 0 | Status: SHIPPED | OUTPATIENT
Start: 2025-05-19

## 2025-06-29 DIAGNOSIS — I50.30 HEART FAILURE WITH PRESERVED EJECTION FRACTION, NYHA CLASS II (H): ICD-10-CM

## 2025-06-30 RX ORDER — EMPAGLIFLOZIN 10 MG/1
10 TABLET, FILM COATED ORAL DAILY
Qty: 90 TABLET | Refills: 0 | Status: SHIPPED | OUTPATIENT
Start: 2025-06-30

## 2025-06-30 RX ORDER — SPIRONOLACTONE 25 MG/1
12.5 TABLET ORAL DAILY
Qty: 45 TABLET | Refills: 0 | Status: SHIPPED | OUTPATIENT
Start: 2025-06-30

## 2025-08-11 DIAGNOSIS — I10 ESSENTIAL HYPERTENSION, BENIGN: ICD-10-CM

## 2025-08-12 RX ORDER — LISINOPRIL 20 MG/1
20 TABLET ORAL DAILY
Qty: 90 TABLET | Refills: 0 | Status: SHIPPED | OUTPATIENT
Start: 2025-08-12

## 2025-08-28 ENCOUNTER — E-VISIT (OUTPATIENT)
Dept: FAMILY MEDICINE | Facility: CLINIC | Age: 65
End: 2025-08-28
Payer: COMMERCIAL

## 2025-08-28 ENCOUNTER — OFFICE VISIT (OUTPATIENT)
Dept: URGENT CARE | Facility: URGENT CARE | Age: 65
End: 2025-08-28
Payer: COMMERCIAL

## 2025-08-28 VITALS
WEIGHT: 148.3 LBS | SYSTOLIC BLOOD PRESSURE: 129 MMHG | HEIGHT: 64 IN | RESPIRATION RATE: 24 BRPM | HEART RATE: 68 BPM | TEMPERATURE: 97.6 F | DIASTOLIC BLOOD PRESSURE: 75 MMHG | BODY MASS INDEX: 25.32 KG/M2 | OXYGEN SATURATION: 98 %

## 2025-08-28 DIAGNOSIS — R05.1 ACUTE COUGH: Primary | ICD-10-CM

## 2025-08-28 PROBLEM — E78.00 PRIMARY HYPERCHOLESTEROLEMIA: Status: ACTIVE | Noted: 2022-01-18

## 2025-08-28 RX ORDER — PREDNISONE 20 MG/1
20 TABLET ORAL DAILY
Qty: 5 TABLET | Refills: 0 | Status: SHIPPED | OUTPATIENT
Start: 2025-08-28

## (undated) DEVICE — 4FR X 10 CM SHEATH, MINI-STICK MAX COAXIAL VASCULAR ENTRY KIT, 0.018IN NITINOL TUNGSTEN TIP GUIDEWIRE, ECHOGENIC NEEDLE

## (undated) DEVICE — CATH DIAG 4FR JR 5.0 538423

## (undated) DEVICE — SYR ANGIOGRAPHY MULTIUSE KIT ACIST 014612

## (undated) DEVICE — KIT HAND CONTROL ACIST 014644 AR-P54

## (undated) DEVICE — ELECTRODE ADULT PACING MULTI P-211-M1

## (undated) DEVICE — CUSTOM PACK CORONARY SAN5BCRHEA

## (undated) DEVICE — EXCHANGE WIRE .035 260 STAR/JFC/035/260/ M001491681

## (undated) DEVICE — SHEATH GLIDE RADIAL 4FR 25CM 0.021

## (undated) DEVICE — GUIDEWIRE STARTER .035INX150CM

## (undated) DEVICE — SLEEVE TR BAND RADIAL COMPRESSION DEVICE 24CM TRB24-REG

## (undated) DEVICE — CATH ANGIO INFINITI JL3.5 4FRX100CM 538418

## (undated) DEVICE — MANIFOLD KIT ANGIO AUTOMATED 014613

## (undated) RX ORDER — FENTANYL CITRATE 50 UG/ML
INJECTION, SOLUTION INTRAMUSCULAR; INTRAVENOUS
Status: DISPENSED
Start: 2022-02-25

## (undated) RX ORDER — DIAZEPAM 5 MG
TABLET ORAL
Status: DISPENSED
Start: 2022-02-25